# Patient Record
Sex: FEMALE | Race: BLACK OR AFRICAN AMERICAN | Employment: OTHER | URBAN - METROPOLITAN AREA
[De-identification: names, ages, dates, MRNs, and addresses within clinical notes are randomized per-mention and may not be internally consistent; named-entity substitution may affect disease eponyms.]

---

## 2017-02-02 ENCOUNTER — OP HISTORICAL/CONVERTED ENCOUNTER (OUTPATIENT)
Dept: OTHER | Age: 77
End: 2017-02-02

## 2017-08-04 ENCOUNTER — ED HISTORICAL/CONVERTED ENCOUNTER (OUTPATIENT)
Dept: OTHER | Age: 77
End: 2017-08-04

## 2017-08-12 ENCOUNTER — ED HISTORICAL/CONVERTED ENCOUNTER (OUTPATIENT)
Dept: OTHER | Age: 77
End: 2017-08-12

## 2017-10-02 ENCOUNTER — OP HISTORICAL/CONVERTED ENCOUNTER (OUTPATIENT)
Dept: OTHER | Age: 77
End: 2017-10-02

## 2017-11-07 ENCOUNTER — OP HISTORICAL/CONVERTED ENCOUNTER (OUTPATIENT)
Dept: OTHER | Age: 77
End: 2017-11-07

## 2018-01-13 ENCOUNTER — ED HISTORICAL/CONVERTED ENCOUNTER (OUTPATIENT)
Dept: OTHER | Age: 78
End: 2018-01-13

## 2018-01-16 ENCOUNTER — OP HISTORICAL/CONVERTED ENCOUNTER (OUTPATIENT)
Dept: OTHER | Age: 78
End: 2018-01-16

## 2018-03-20 ENCOUNTER — OP HISTORICAL/CONVERTED ENCOUNTER (OUTPATIENT)
Dept: OTHER | Age: 78
End: 2018-03-20

## 2018-04-25 ENCOUNTER — OP HISTORICAL/CONVERTED ENCOUNTER (OUTPATIENT)
Dept: OTHER | Age: 78
End: 2018-04-25

## 2018-10-25 ENCOUNTER — OP HISTORICAL/CONVERTED ENCOUNTER (OUTPATIENT)
Dept: OTHER | Age: 78
End: 2018-10-25

## 2018-11-01 ENCOUNTER — OP HISTORICAL/CONVERTED ENCOUNTER (OUTPATIENT)
Dept: OTHER | Age: 78
End: 2018-11-01

## 2018-11-01 LAB — COLONOSCOPY, EXTERNAL: NORMAL

## 2018-11-16 ENCOUNTER — OP HISTORICAL/CONVERTED ENCOUNTER (OUTPATIENT)
Dept: OTHER | Age: 78
End: 2018-11-16

## 2019-02-05 ENCOUNTER — OP HISTORICAL/CONVERTED ENCOUNTER (OUTPATIENT)
Dept: OTHER | Age: 79
End: 2019-02-05

## 2019-04-29 LAB — LDL-C, EXTERNAL: 87

## 2019-05-06 ENCOUNTER — OP HISTORICAL/CONVERTED ENCOUNTER (OUTPATIENT)
Dept: OTHER | Age: 79
End: 2019-05-06

## 2019-11-18 ENCOUNTER — OP HISTORICAL/CONVERTED ENCOUNTER (OUTPATIENT)
Dept: OTHER | Age: 79
End: 2019-11-18

## 2019-11-18 LAB — MAMMOGRAPHY, EXTERNAL: NORMAL

## 2019-12-20 LAB — CREATININE, EXTERNAL: 0.9

## 2020-09-21 VITALS
WEIGHT: 134 LBS | HEART RATE: 64 BPM | TEMPERATURE: 98 F | SYSTOLIC BLOOD PRESSURE: 156 MMHG | DIASTOLIC BLOOD PRESSURE: 90 MMHG | OXYGEN SATURATION: 98 % | RESPIRATION RATE: 18 BRPM | HEIGHT: 65 IN | BODY MASS INDEX: 22.33 KG/M2

## 2020-09-21 PROBLEM — E78.00 PURE HYPERCHOLESTEROLEMIA: Status: ACTIVE | Noted: 2020-09-21

## 2020-09-21 PROBLEM — I10 ESSENTIAL HYPERTENSION: Status: ACTIVE | Noted: 2020-09-21

## 2020-09-21 PROBLEM — M85.80 OSTEOPENIA: Status: ACTIVE | Noted: 2020-09-21

## 2020-09-21 PROBLEM — R53.83 FATIGUE: Status: ACTIVE | Noted: 2020-09-21

## 2020-09-22 ENCOUNTER — OFFICE VISIT (OUTPATIENT)
Dept: INTERNAL MEDICINE CLINIC | Age: 80
End: 2020-09-22
Payer: MEDICARE

## 2020-09-22 VITALS
HEIGHT: 65 IN | TEMPERATURE: 98.3 F | OXYGEN SATURATION: 99 % | BODY MASS INDEX: 21.92 KG/M2 | SYSTOLIC BLOOD PRESSURE: 130 MMHG | RESPIRATION RATE: 18 BRPM | HEART RATE: 72 BPM | DIASTOLIC BLOOD PRESSURE: 80 MMHG | WEIGHT: 131.6 LBS

## 2020-09-22 DIAGNOSIS — E78.00 PURE HYPERCHOLESTEROLEMIA: ICD-10-CM

## 2020-09-22 DIAGNOSIS — I10 ESSENTIAL HYPERTENSION: Primary | ICD-10-CM

## 2020-09-22 DIAGNOSIS — F43.21 SITUATIONAL DEPRESSION: ICD-10-CM

## 2020-09-22 DIAGNOSIS — R63.4 WEIGHT LOSS: ICD-10-CM

## 2020-09-22 DIAGNOSIS — M81.0 OSTEOPOROSIS WITHOUT CURRENT PATHOLOGICAL FRACTURE, UNSPECIFIED OSTEOPOROSIS TYPE: ICD-10-CM

## 2020-09-22 DIAGNOSIS — F41.8 SITUATIONAL ANXIETY: ICD-10-CM

## 2020-09-22 DIAGNOSIS — M85.80 OSTEOPENIA, UNSPECIFIED LOCATION: ICD-10-CM

## 2020-09-22 PROCEDURE — 99214 OFFICE O/P EST MOD 30 MIN: CPT | Performed by: INTERNAL MEDICINE

## 2020-09-22 PROCEDURE — G0444 DEPRESSION SCREEN ANNUAL: HCPCS | Performed by: INTERNAL MEDICINE

## 2020-09-22 RX ORDER — AMLODIPINE BESYLATE AND BENAZEPRIL HYDROCHLORIDE 10; 40 MG/1; MG/1
CAPSULE ORAL
COMMUNITY
End: 2020-12-22 | Stop reason: ALTCHOICE

## 2020-09-22 RX ORDER — SPIRONOLACTONE 25 MG/1
1 TABLET ORAL DAILY
COMMUNITY
End: 2020-12-22

## 2020-09-22 RX ORDER — DOCUSATE SODIUM 100 MG/1
1 CAPSULE, LIQUID FILLED ORAL
COMMUNITY
End: 2021-01-22 | Stop reason: ALTCHOICE

## 2020-09-22 RX ORDER — METOPROLOL SUCCINATE 100 MG/1
0.5 TABLET, EXTENDED RELEASE ORAL DAILY
COMMUNITY
Start: 2019-12-20 | End: 2020-12-22

## 2020-09-22 RX ORDER — SERTRALINE HYDROCHLORIDE 25 MG/1
25 TABLET, FILM COATED ORAL
Qty: 90 TAB | Refills: 0 | Status: SHIPPED | OUTPATIENT
Start: 2020-09-22 | End: 2020-12-15

## 2020-09-22 RX ORDER — ASPIRIN 81 MG/1
1 TABLET ORAL DAILY
COMMUNITY

## 2020-09-22 RX ORDER — MULTIVITAMIN
1 TABLET ORAL DAILY
COMMUNITY
End: 2021-01-22 | Stop reason: ALTCHOICE

## 2020-09-22 RX ORDER — ATORVASTATIN CALCIUM 20 MG/1
1 TABLET, FILM COATED ORAL DAILY
COMMUNITY
End: 2021-01-22 | Stop reason: ALTCHOICE

## 2020-09-22 RX ORDER — ALENDRONATE SODIUM 35 MG/1
1 TABLET ORAL
COMMUNITY
End: 2020-09-28

## 2020-09-22 NOTE — PROGRESS NOTES
Momo Martínez is a 68 y.o. female and presents with Hypertension and Cholesterol Problem    Having no new complaints except not having good appetite and some, situational anxiety, with the television news, that is going in the world and sometimes, affecting her appetite and weight,, her blood pressure is wonderfully controlled in sitting and standing position she is compliant taking medicines and she is due for her blood work, she has some mild gradual weight loss of course she is 68, she does not have negative thoughts she has good family support, she has 8 grandchildren, she has osteopenia and she had osteoporosis at one time,,  No palpitation no chest pain, no shortness of breath, no exposure to COVID-19. Review of Systems    Review of Systems   Constitutional: Positive for weight loss. HENT: Negative for hearing loss. Eyes: Negative for blurred vision. Respiratory: Negative for cough and shortness of breath. Cardiovascular: Negative. Gastrointestinal: Negative. Genitourinary: Negative. Musculoskeletal: Negative. Neurological: Negative for dizziness, sensory change and headaches. Psychiatric/Behavioral: The patient is not nervous/anxious.          Past Medical History:   Diagnosis Date    Anxiety     Asthma     Hypercholesterolemia     Hypertension     Osteoporosis     Situational depression 9/22/2020     Past Surgical History:   Procedure Laterality Date    HX COLONOSCOPY  11/10/2015    HX HYSTERECTOMY      HX THYROIDECTOMY       Social History     Socioeconomic History    Marital status:      Spouse name: Not on file    Number of children: Not on file    Years of education: Not on file    Highest education level: Not on file   Tobacco Use    Smoking status: Former Smoker    Smokeless tobacco: Former User   Substance and Sexual Activity    Alcohol use: Yes     Comment: occ     Family History   Problem Relation Age of Onset    Alzheimer Mother    Jin Manriquez Dementia Mother     Heart Disease Maternal Grandmother      Current Outpatient Medications   Medication Sig Dispense Refill    metoprolol succinate (TOPROL-XL) 100 mg tablet Take 0.5 Tabs by mouth daily.  sertraline (ZOLOFT) 25 mg tablet Take 1 Tab by mouth nightly. 90 Tab 0    atorvastatin (LIPITOR) 20 mg tablet Take 1 Tab by mouth daily.  alendronate (FOSAMAX) 35 mg tablet Take 1 Tab by mouth every seven (7) days.  spironolactone (ALDACTONE) 25 mg tablet Take 1 Tab by mouth daily.  docusate sodium (COLACE) 100 mg capsule Take 1 Cap by mouth nightly.  aspirin delayed-release 81 mg tablet Take 1 Tab by mouth daily.  calcium-cholecalciferol, D3, (CALTRATE 600+D) tablet Take 1 Tab by mouth daily.  multivit-min/iron/folic/lutein (CENTRUM SILVER WOMEN PO) Take 1 Tab by mouth daily.  amLODIPine-benazepril (LOTREL) 10-40 mg per capsule amlodipine 10 mg-benazepril 40 mg capsule   TAKE 1 CAPSULE BY MOUTH ONCE DAILY AS DIRECTED       Allergies   Allergen Reactions    Metronidazole Itching       Objective:  Visit Vitals  /80 (BP 1 Location: Left arm, BP Patient Position: Sitting)   Pulse 72   Temp 98.3 °F (36.8 °C) (Oral)   Resp 18   Ht 5' 5\" (1.651 m)   Wt 131 lb 9.6 oz (59.7 kg)   SpO2 99%   BMI 21.90 kg/m²       Physical Exam:   Constitutional: General Appearance: healthy-appearing . Level of Distress: NAD. Ambulation: ambulating normal  Psychiatric: Mental Status: normal mood and affect and active and alert. Orientation: to time, place, and person. no agitation. ,normal eye contact. normal insight  Head: Head: normocephalic and atraumatic. Eyes: Pupils: PERRLA. Sclerae: non-icteric. Neck: Neck: supple, trachea midline, and no masses. Lymph Nodes: no cervical LAD. Thyroid: no enlargement or nodules and non-tender. Lungs: Respiratory effort: no dyspnea.  Auscultation: no wheezing, rales/crackles, or rhonchi and breath sounds normal and good air movement. Cardiovascular: Apical Impulse: not displaced. Heart Auscultation: normal S1 and S2; no murmurs, rubs, or gallops; and RRR. Neck vessels: no carotid bruits. Pulses including femoral / pedal: normal throughout. Abdomen: Bowel Sounds: normal. Inspection and Palpation: no tenderness, guarding, or masses and soft and non-distended. Liver: non-tender and no hepatomegaly. Spleen: non-tender and no splenomegaly. Musculoskeletal[de-identified] Extremities: no edema,no varicosities. No Calf tenderness. Neurologic: Gait and Station: normal gait and station. Motor Strength normal right and left. Skin: Inspection and palpation: no rash, lesions, or ulcer. Results for orders placed or performed in visit on 09/21/20    MAMMOGRAPHY   Result Value Ref Range    Mammography, External repeat in 1 year     AMB EXT LDL-C   Result Value Ref Range    LDL-C, External 87    AMB EXT CREATININE   Result Value Ref Range    Creatinine, External 0.90    HM COLONOSCOPY   Result Value Ref Range    Colonoscopy, External repeat in 5 years         Assessment/Plan:    ICD-10-CM ICD-9-CM    1. Essential hypertension  I10 401.9 TSH 3RD GENERATION      CBC WITH AUTOMATED DIFF      URINALYSIS W/ RFLX MICROSCOPIC      METABOLIC PANEL, COMPREHENSIVE   2. Pure hypercholesterolemia  E78.00 272.0 LIPID PANEL   3. Osteopenia, unspecified location  M85.80 733.90 VITAMIN D, 25 HYDROXY   4. Weight loss  R63.4 783.21 TSH 3RD GENERATION      CBC WITH AUTOMATED DIFF      SED RATE (ESR)      METABOLIC PANEL, COMPREHENSIVE   5. Situational depression  F43.21 309.0 TSH 3RD GENERATION   6. Situational anxiety  F41.8 300.09    7.  Osteoporosis without current pathological fracture, unspecified osteoporosis type  M81.0 733.00 VITAMIN D, 25 HYDROXY     Orders Placed This Encounter    TSH 3RD GENERATION    CBC WITH AUTOMATED DIFF    SED RATE (ESR)    VITAMIN D, 25 HYDROXY    URINALYSIS W/ RFLX MICROSCOPIC    LIPID PANEL    METABOLIC PANEL, COMPREHENSIVE  atorvastatin (LIPITOR) 20 mg tablet     Sig: Take 1 Tab by mouth daily.  alendronate (FOSAMAX) 35 mg tablet     Sig: Take 1 Tab by mouth every seven (7) days.  spironolactone (ALDACTONE) 25 mg tablet     Sig: Take 1 Tab by mouth daily.  docusate sodium (COLACE) 100 mg capsule     Sig: Take 1 Cap by mouth nightly.  aspirin delayed-release 81 mg tablet     Sig: Take 1 Tab by mouth daily.  calcium-cholecalciferol, D3, (CALTRATE 600+D) tablet     Sig: Take 1 Tab by mouth daily.  metoprolol succinate (TOPROL-XL) 100 mg tablet     Sig: Take 0.5 Tabs by mouth daily.  multivit-min/iron/folic/lutein (CENTRUM SILVER WOMEN PO)     Sig: Take 1 Tab by mouth daily.  sertraline (ZOLOFT) 25 mg tablet     Sig: Take 1 Tab by mouth nightly. Dispense:  90 Tab     Refill:  0    amLODIPine-benazepril (LOTREL) 10-40 mg per capsule     Sig: amlodipine 10 mg-benazepril 40 mg capsule   TAKE 1 CAPSULE BY MOUTH ONCE DAILY AS DIRECTED       Hypertension controlled no real orthostatic hypotension no dizziness, continued on metoprolol  mg once a day, spironolactone 25 mg once a day, continue amlodipine benazepril 10/40 mg once a day. Diet low in sodium and labs ordered. Hypercholesterolemia labs ordered and continue atorvastatin 20 mg at bedtime. Healthy eating habits. Osteopenia with history of osteoporosis in the past vitamin D level ordered. Weight loss which is gradual she has some situational anxiety and mild sadness with the things going in the world and heavy COVID-19, strongly recommended to, not to watch the news on the television that can make her sad instead she should be occupied in a more healthy news and started on low-dose of sertraline 25 mg once a day and follow-up in 3 months and labs ordered.     , Education and counseling given and I reviewed her previous encounters and previous weight and she has some gradual weight loss but, she does not have any abnormal symptoms she has very good support from her  and she has 8 grandchildren,Enjoying her life otherwise,. Answered all her questions and follow-up in 3 months. follow low fat diet, continue present plan, routine labs ordered, call if any problems    There are no Patient Instructions on file for this visit. Follow-up and Dispositions    · Return in about 3 months (around 12/22/2020) for htn ,[place] ,fasting labs now.

## 2020-09-28 RX ORDER — ALENDRONATE SODIUM 35 MG/1
TABLET ORAL
Qty: 12 TAB | Refills: 1 | Status: SHIPPED | OUTPATIENT
Start: 2020-09-28 | End: 2021-04-09

## 2020-12-22 ENCOUNTER — OFFICE VISIT (OUTPATIENT)
Dept: INTERNAL MEDICINE CLINIC | Age: 80
End: 2020-12-22
Payer: MEDICARE

## 2020-12-22 VITALS
DIASTOLIC BLOOD PRESSURE: 90 MMHG | RESPIRATION RATE: 18 BRPM | HEIGHT: 65 IN | WEIGHT: 136 LBS | SYSTOLIC BLOOD PRESSURE: 164 MMHG | HEART RATE: 72 BPM | OXYGEN SATURATION: 99 % | BODY MASS INDEX: 22.66 KG/M2

## 2020-12-22 DIAGNOSIS — F41.8 SITUATIONAL ANXIETY: ICD-10-CM

## 2020-12-22 DIAGNOSIS — E78.00 PURE HYPERCHOLESTEROLEMIA: ICD-10-CM

## 2020-12-22 DIAGNOSIS — M85.80 OSTEOPENIA, UNSPECIFIED LOCATION: ICD-10-CM

## 2020-12-22 DIAGNOSIS — F43.21 SITUATIONAL DEPRESSION: ICD-10-CM

## 2020-12-22 DIAGNOSIS — R68.89 FORGETFULNESS: ICD-10-CM

## 2020-12-22 DIAGNOSIS — I10 ESSENTIAL HYPERTENSION: ICD-10-CM

## 2020-12-22 PROCEDURE — 99214 OFFICE O/P EST MOD 30 MIN: CPT | Performed by: INTERNAL MEDICINE

## 2020-12-22 RX ORDER — METOPROLOL SUCCINATE 50 MG/1
50 TABLET, EXTENDED RELEASE ORAL DAILY
Qty: 90 TAB | Refills: 1 | Status: SHIPPED | OUTPATIENT
Start: 2020-12-22 | End: 2021-07-15

## 2020-12-22 RX ORDER — LOSARTAN POTASSIUM AND HYDROCHLOROTHIAZIDE 12.5; 5 MG/1; MG/1
1 TABLET ORAL DAILY
Qty: 90 TAB | Refills: 1 | Status: SHIPPED | OUTPATIENT
Start: 2020-12-22 | End: 2021-06-15

## 2020-12-22 RX ORDER — SERTRALINE HYDROCHLORIDE 25 MG/1
25 TABLET, FILM COATED ORAL DAILY
Qty: 90 TAB | Refills: 1 | Status: SHIPPED | OUTPATIENT
Start: 2020-12-22 | End: 2021-05-28 | Stop reason: DRUGHIGH

## 2020-12-22 RX ORDER — AMLODIPINE BESYLATE 10 MG/1
10 TABLET ORAL DAILY
Qty: 90 TAB | Refills: 1 | Status: SHIPPED | OUTPATIENT
Start: 2020-12-22 | End: 2021-06-15

## 2020-12-22 NOTE — PROGRESS NOTES
Sara Barragan is a 68 y.o. female and presents with Follow Up Chronic Condition (htn ,[place] )    She did not take any of her medicine and her blood pressure is elevated it seems like she remains confused, according to her she lives with her , she has anxious personality, I did medicine reconciliation and it seems like she is not taking low-dose of sertraline and atorvastatin having hypercholesterolemia,,, I made changes in the medicines explained by me and by my nurse, that I have stopped amlodipine benazepril 10/40 mg once a day and I have stopped spironolactone 25 mg once a day instead she has been started on losartan hydrochlorothiazide 50/12.5 mg once a day, amlodipine 10 mg once a day and continued on metoprolol ER 50 mg once a day. In the past she could not tolerate higher dose of metoprolol ER given by her cardiologist.  She has anxious personality worried for coronavirus and explained about holistic approach and to start taking sertraline 25 mg at bedtime if needed I will start her on rescue medicine she told me she does not have panic attacks and last time, in last visit I ordered labs in, September she did not do any labs, she does not have anybody else who can come with her except her ,, clinically remains confused, regarding her medicines, does not look like she has dementia,,  If needed I will refer her to neurologist after doing her lab work  lab orders created again. No negative thoughts. she used to take alendronate 35 mg once a week having osteopenia. In the past she follows and used to see cardiologist once a year. Review of Systems    Review of Systems   Constitutional: Negative. Slight weight gain   HENT: Negative for congestion, hearing loss, sinus pain and sore throat. Eyes: Negative for blurred vision. Respiratory: Negative for cough and wheezing. Cardiovascular: Negative. Gastrointestinal: Negative. Genitourinary: Negative. Musculoskeletal: Negative. Neurological: Negative for dizziness, sensory change, speech change, weakness and headaches. Psychiatric/Behavioral: Negative for depression, hallucinations, memory loss, substance abuse and suicidal ideas. The patient is nervous/anxious. history of anxious personality      Pleasant  Past Medical History:   Diagnosis Date    Anxiety     Asthma     Hypercholesterolemia     Hypertension     Osteoporosis     Situational depression 9/22/2020     Past Surgical History:   Procedure Laterality Date    HX COLONOSCOPY  11/10/2015    HX HYSTERECTOMY      HX THYROIDECTOMY       Social History     Socioeconomic History    Marital status:      Spouse name: Not on file    Number of children: Not on file    Years of education: Not on file    Highest education level: Not on file   Tobacco Use    Smoking status: Former Smoker    Smokeless tobacco: Former User   Substance and Sexual Activity    Alcohol use: Yes     Comment: occ     Family History   Problem Relation Age of Onset    Alzheimer Mother     Dementia Mother     Heart Disease Maternal Grandmother      Current Outpatient Medications   Medication Sig Dispense Refill    losartan-hydroCHLOROthiazide (HYZAAR) 50-12.5 mg per tablet Take 1 Tab by mouth daily. 90 Tab 1    amLODIPine (NORVASC) 10 mg tablet Take 1 Tab by mouth daily. 90 Tab 1    metoprolol succinate (TOPROL-XL) 50 mg XL tablet Take 1 Tab by mouth daily. 90 Tab 1    sertraline (ZOLOFT) 25 mg tablet Take 1 Tab by mouth daily. 90 Tab 1    alendronate (FOSAMAX) 35 mg tablet TAKE 1 TABLET BY MOUTH ONCE WEEK AS DIRECTECD 12 Tab 1    atorvastatin (LIPITOR) 20 mg tablet Take 1 Tab by mouth daily.  docusate sodium (COLACE) 100 mg capsule Take 1 Cap by mouth nightly.  aspirin delayed-release 81 mg tablet Take 1 Tab by mouth daily.  calcium-cholecalciferol, D3, (CALTRATE 600+D) tablet Take 1 Tab by mouth daily.       multivit-min/iron/folic/lutein (CENTRUM SILVER WOMEN PO) Take 1 Tab by mouth daily. Allergies   Allergen Reactions    Metronidazole Itching       Objective:  Visit Vitals  BP (!) 164/90 (BP 1 Location: Left arm, BP Patient Position: Sitting)   Pulse 72   Resp 18   Ht 5' 5\" (1.651 m)   Wt 136 lb (61.7 kg)   SpO2 99%   BMI 22.63 kg/m²       Physical Exam:   Constitutional: General Appearance: Pleasant and anxious. Level of Distress: NAD. Psychiatric: Mental Status: normal mood and affect Orientation: to time, place, and person. ,normal eye contact. Head: Head: normocephalic and atraumatic. Eyes: Pupils: PERRLA. Sclerae: non-icteric. Neck: Neck: supple, trachea midline, and no masses. Lymph Nodes: no cervical LAD. Thyroid: no enlargement or nodules and non-tender. Lungs: Respiratory effort: no dyspnea. Auscultation: no wheezing, rales/crackles, or rhonchi and breath sounds normal and good air movement. Cardiovascular: Apical Impulse: not displaced. Heart Auscultation: normal S1 and S2; no murmurs, rubs, or gallops; and RRR. Neck vessels: no carotid bruits. Pulses including femoral / pedal: normal throughout. Abdomen: Bowel Sounds: normal. Inspection and Palpation: no tenderness, guarding, or masses and soft and non-distended. Liver: non-tender and no hepatomegaly. Spleen: non-tender and no splenomegaly. Musculoskeletal[de-identified] Extremities: no edema,no varicosities. No Calf tenderness. Neurologic: Gait and Station: normal gait and station. Motor Strength normal right and left. Skin: Inspection and palpation: no rash, lesions, or ulcer.        Results for orders placed or performed in visit on 09/21/20    MAMMOGRAPHY   Result Value Ref Range    Mammography, External repeat in 1 year     AMB EXT LDL-C   Result Value Ref Range    LDL-C, External 87    AMB EXT CREATININE   Result Value Ref Range    Creatinine, External 0.90    HM COLONOSCOPY   Result Value Ref Range    Colonoscopy, External repeat in 5 years         Assessment/Plan:      ICD-10-CM ICD-9-CM    1. Essential hypertension  I10 401.9 CBC WITH AUTOMATED DIFF      METABOLIC PANEL, COMPREHENSIVE      URINALYSIS W/ RFLX MICROSCOPIC      TSH 3RD GENERATION   2. Pure hypercholesterolemia  E78.00 272.0 LIPID PANEL   3. Situational anxiety  F41.8 300.09    4. Osteopenia, unspecified location  M85.80 733.90    5. Situational depression  F43.21 309.0    6. Forgetfulness  R68.89 780.99 CBC WITH AUTOMATED DIFF      METABOLIC PANEL, COMPREHENSIVE      TSH 3RD GENERATION      VITAMIN B12     Orders Placed This Encounter    CBC WITH AUTOMATED DIFF    METABOLIC PANEL, COMPREHENSIVE    LIPID PANEL    URINALYSIS W/ RFLX MICROSCOPIC    TSH 3RD GENERATION    VITAMIN B12    losartan-hydroCHLOROthiazide (HYZAAR) 50-12.5 mg per tablet     Sig: Take 1 Tab by mouth daily. Dispense:  90 Tab     Refill:  1    amLODIPine (NORVASC) 10 mg tablet     Sig: Take 1 Tab by mouth daily. Dispense:  90 Tab     Refill:  1    metoprolol succinate (TOPROL-XL) 50 mg XL tablet     Sig: Take 1 Tab by mouth daily. Dispense:  90 Tab     Refill:  1    sertraline (ZOLOFT) 25 mg tablet     Sig: Take 1 Tab by mouth daily. Dispense:  90 Tab     Refill:  1       Hypertension uncontrolled in both upper arms according to her, it seems like she has not taken her medicines it seems like she is not diligent in maintaining the time  for taking medicines I changed her amlodipine benazepril, and stopped amlodipine benazepril,  I stopped her spironolactone for better compliance,. Started on amlodipine 10 mg once a day, losartan hydrochlorothiazide 50/12.5 mg once a day and continued on metoprolol ER 50 mg once a day which can help in her  anxiety related palpitations.     In the past she could not tolerate higher dose of metoprolol ER given by Dr. Shauna Alcaraz her cardiologist.    Anand Song it seems like she has stopped taking atorvastatin she used to take atorvastatin 20 mg at bedtime I will order labs if needed I will decrease the dose at her age.    , History of anxiety with history of situational depression now she is worried for COVID-19, reassured, recommended to  not to watch unimportant news, and not to be anxious, try to be involved in doing creative and, constructive activities and prayers, she agreed she should have holistic approach, started back on sertraline 25 mg/day and if needed I will start her on,, alprazolam low-dose. Osteopenia continue alendronate 35 mg once a day with multivitamins and vitamin D supplementation. Labs ordered. I am not still convinced that she has forgetfulness but it seems like she remains confused, if needed I will refer her to neurologist after reviewing her lab results and after  monitoring her blood pressure, she lives with her . She is not providing good history. She has no negative thoughts. Follow-up in 3 weeks. Refills given and labs ordered and we called pharmacy also not to give refill for medicine that I stopped including spironolactone and amlodipine benazepril that I  again denied I told her that she should also discuss with pharmacist that these 2 medicines are stopped,.    continue present plan, routine labs ordered, call if any problems    There are no Patient Instructions on file for this visit. Follow-up and Dispositions    · Return in about 3 weeks (around 1/12/2021) for htn ,fasting labs now ,she should bring some responsible family memeber .

## 2021-01-22 ENCOUNTER — OFFICE VISIT (OUTPATIENT)
Dept: INTERNAL MEDICINE CLINIC | Age: 81
End: 2021-01-22
Payer: MEDICARE

## 2021-01-22 VITALS
OXYGEN SATURATION: 97 % | HEART RATE: 60 BPM | HEIGHT: 65 IN | DIASTOLIC BLOOD PRESSURE: 78 MMHG | BODY MASS INDEX: 22.33 KG/M2 | WEIGHT: 134 LBS | SYSTOLIC BLOOD PRESSURE: 130 MMHG | RESPIRATION RATE: 18 BRPM

## 2021-01-22 DIAGNOSIS — E55.9 VITAMIN D DEFICIENCY: ICD-10-CM

## 2021-01-22 DIAGNOSIS — F41.8 SITUATIONAL ANXIETY: ICD-10-CM

## 2021-01-22 DIAGNOSIS — M85.80 OSTEOPENIA, UNSPECIFIED LOCATION: ICD-10-CM

## 2021-01-22 DIAGNOSIS — F43.21 SITUATIONAL DEPRESSION: ICD-10-CM

## 2021-01-22 DIAGNOSIS — R68.89 FORGETFULNESS: ICD-10-CM

## 2021-01-22 DIAGNOSIS — I10 ESSENTIAL HYPERTENSION: ICD-10-CM

## 2021-01-22 DIAGNOSIS — Z86.59 HISTORY OF PANIC ATTACKS: ICD-10-CM

## 2021-01-22 DIAGNOSIS — E78.00 PURE HYPERCHOLESTEROLEMIA: ICD-10-CM

## 2021-01-22 PROCEDURE — G9717 DOC PT DX DEP/BP F/U NT REQ: HCPCS | Performed by: INTERNAL MEDICINE

## 2021-01-22 PROCEDURE — G8754 DIAS BP LESS 90: HCPCS | Performed by: INTERNAL MEDICINE

## 2021-01-22 PROCEDURE — G8400 PT W/DXA NO RESULTS DOC: HCPCS | Performed by: INTERNAL MEDICINE

## 2021-01-22 PROCEDURE — 1090F PRES/ABSN URINE INCON ASSESS: CPT | Performed by: INTERNAL MEDICINE

## 2021-01-22 PROCEDURE — 99213 OFFICE O/P EST LOW 20 MIN: CPT | Performed by: INTERNAL MEDICINE

## 2021-01-22 PROCEDURE — 1100F PTFALLS ASSESS-DOCD GE2>/YR: CPT | Performed by: INTERNAL MEDICINE

## 2021-01-22 PROCEDURE — G8427 DOCREV CUR MEDS BY ELIG CLIN: HCPCS | Performed by: INTERNAL MEDICINE

## 2021-01-22 PROCEDURE — G8420 CALC BMI NORM PARAMETERS: HCPCS | Performed by: INTERNAL MEDICINE

## 2021-01-22 PROCEDURE — G8752 SYS BP LESS 140: HCPCS | Performed by: INTERNAL MEDICINE

## 2021-01-22 PROCEDURE — 3288F FALL RISK ASSESSMENT DOCD: CPT | Performed by: INTERNAL MEDICINE

## 2021-01-22 PROCEDURE — G8536 NO DOC ELDER MAL SCRN: HCPCS | Performed by: INTERNAL MEDICINE

## 2021-01-22 RX ORDER — HYDROXYZINE HYDROCHLORIDE 10 MG/1
10 TABLET, FILM COATED ORAL
Qty: 30 TAB | Refills: 1 | Status: SHIPPED | OUTPATIENT
Start: 2021-01-22 | End: 2021-02-01

## 2021-01-22 NOTE — PROGRESS NOTES
Mane Albarado is a 68 y.o. female and presents with Hypertension    Ms. Ann Hughes came with all her medications today her blood pressure is wonderfully controlled after making changes in her medicine in last visit, she did not do her labs that I ordered, she sometimes remains confused so, labs orders placed again and given in her hand. She told me that she has anxiety with the things that are going in the country patient is reassured that nothing can be changed and she has to keep positive thinking and jeff in God. She has no orthostatic hypotension. I did not see any statin in her back. I will order lipid profile and if needed I will start her on statin. She has osteopenia taking alendronate. She has no chest pain or shortness of breath she has started taking low-dose of sertraline for her history of anxiety. She has quit smoking cigarettes since long. She lives with her family members. She has good family support. She understood all instructions appropriately and correctly                  Review of Systems    Review of Systems   Constitutional: Negative. HENT: Negative for congestion, hearing loss and sore throat. Eyes: Negative for blurred vision. Respiratory: Negative for cough. Cardiovascular: Negative. Gastrointestinal: Negative. Genitourinary: Negative. Musculoskeletal: Negative. Neurological: Negative for dizziness, focal weakness and headaches. Psychiatric/Behavioral: Negative for depression and memory loss. The patient is not nervous/anxious and does not have insomnia.          Past Medical History:   Diagnosis Date    Anxiety     Asthma     Hypercholesterolemia     Hypertension     Osteoporosis     Situational depression 9/22/2020     Past Surgical History:   Procedure Laterality Date    HX COLONOSCOPY  11/10/2015    HX HYSTERECTOMY      HX THYROIDECTOMY       Social History     Socioeconomic History    Marital status:      Spouse name: Not on file  Number of children: Not on file    Years of education: Not on file    Highest education level: Not on file   Tobacco Use    Smoking status: Former Smoker    Smokeless tobacco: Former User   Substance and Sexual Activity    Alcohol use: Yes     Comment: occ     Family History   Problem Relation Age of Onset    Alzheimer Mother     Dementia Mother     Heart Disease Maternal Grandmother      Current Outpatient Medications   Medication Sig Dispense Refill    hydrOXYzine HCL (ATARAX) 10 mg tablet Take 1 Tab by mouth two (2) times daily as needed for Anxiety for up to 10 days. 30 Tab 1    losartan-hydroCHLOROthiazide (HYZAAR) 50-12.5 mg per tablet Take 1 Tab by mouth daily. 90 Tab 1    amLODIPine (NORVASC) 10 mg tablet Take 1 Tab by mouth daily. 90 Tab 1    metoprolol succinate (TOPROL-XL) 50 mg XL tablet Take 1 Tab by mouth daily. 90 Tab 1    sertraline (ZOLOFT) 25 mg tablet Take 1 Tab by mouth daily. 90 Tab 1    alendronate (FOSAMAX) 35 mg tablet TAKE 1 TABLET BY MOUTH ONCE WEEK AS DIRECTECD 12 Tab 1    aspirin delayed-release 81 mg tablet Take 1 Tab by mouth daily.  multivit-min/iron/folic/lutein (CENTRUM SILVER WOMEN PO) Take 1 Tab by mouth daily. Allergies   Allergen Reactions    Metronidazole Itching       Objective:  Visit Vitals  /78 (BP 1 Location: Left arm, BP Patient Position: Sitting)   Pulse 60   Resp 18   Ht 5' 5\" (1.651 m)   Wt 134 lb (60.8 kg)   SpO2 97%   BMI 22.30 kg/m²       Physical Exam:   Constitutional: General Appearance: . Level of Distress: NAD. Psychiatric: Mental Status: normal mood and affect Orientation: to time, place, and person. ,normal eye contact. Head: Head: normocephalic and atraumatic. Eyes: Pupils: PERRLA. Sclerae: non-icteric. Neck: Neck: supple, trachea midline, and no masses. Lymph Nodes: no cervical LAD. Thyroid: no enlargement or nodules and non-tender. Lungs: Respiratory effort: no dyspnea.  Auscultation: no wheezing, rales/crackles, or rhonchi and breath sounds normal and good air movement. Cardiovascular: Apical Impulse: not displaced. Heart Auscultation: normal S1 and S2; no murmurs, rubs, or gallops; and RRR. Neck vessels: no carotid bruits. Pulses including femoral / pedal: normal throughout. Abdomen: Bowel Sounds: normal. Inspection and Palpation: no tenderness, guarding, or masses and soft and non-distended. Liver: non-tender and no hepatomegaly. Spleen: non-tender and no splenomegaly. Musculoskeletal[de-identified] Extremities: no edema,no varicosities. No Calf tenderness. Neurologic: Gait and Station: normal gait and station. Motor Strength normal right and left. Skin: Inspection and palpation: no rash, lesions, or ulcer. Results for orders placed or performed in visit on 09/21/20    MAMMOGRAPHY   Result Value Ref Range    Mammography, External repeat in 1 year     AMB EXT LDL-C   Result Value Ref Range    LDL-C, External 87    AMB EXT CREATININE   Result Value Ref Range    Creatinine, External 0.90    HM COLONOSCOPY   Result Value Ref Range    Colonoscopy, External repeat in 5 years         Assessment/Plan:      ICD-10-CM ICD-9-CM    1. Situational anxiety  F41.8 300.09    2. Essential hypertension  I10 401.9 CBC WITH AUTOMATED DIFF      METABOLIC PANEL, COMPREHENSIVE   3. Situational depression  F43.21 309.0 VITAMIN B12   4. Osteopenia, unspecified location  M85.80 733.90    5. Pure hypercholesterolemia  E78.00 272.0 LIPID PANEL   6. Vitamin D deficiency  E55.9 268.9 VITAMIN D, 25 HYDROXY   7. Forgetfulness  R68.89 780.99 TSH 3RD GENERATION      VITAMIN B12   8. History of panic attacks  Z86.59 V11.8      Orders Placed This Encounter    CBC WITH AUTOMATED DIFF    METABOLIC PANEL, COMPREHENSIVE    LIPID PANEL    TSH 3RD GENERATION    VITAMIN B12    VITAMIN D, 25 HYDROXY    hydrOXYzine HCL (ATARAX) 10 mg tablet     Sig: Take 1 Tab by mouth two (2) times daily as needed for Anxiety for up to 10 days.      Dispense:  30 Tab Refill:  1     Hypertension controlled in last visit I made changes and now she is taking all medicines correctly,. Currently she is taking losartan hydrochlorothiazide currently she is taking losartan hydrochlorothiazide 50/12.5 mg once a day,, metoprolol ER 50 mg once a day, amlodipine 10 mg once a day. Diet low in sodium. Labs ordered. She hypercholesteremia she has stopped taking atorvastatin so I will check lipid profile before being on atorvastatin and she does not recall when she stopped taking. Generalized anxiety disorder with mild anxiety with the things going in the country counseling given reassurance given, holistic approach to be taken and explained to keep jeff in the God that she cannot change certain things that she cannot and she has to accept  the things and she has good support from her , and she is taking sertraline 25 mg once a day recommended not to stop. Also started on hydroxyzine 10 mg twice a day sometimes she gets palpitation and she is not able to describe but she might have panic attack so I gave low-dose which will not make her drowsy and to be taken only as needed. She has no negative thoughts. Osteopenia taking alendronate 35 mg once a week and continue  multivitamin once a day and vitamin D 1000/day    Follow-up in 2 months that she wants to come and fasting labs ordered. I checked her medicines and refills she is good for all refills until 6 months. Overall she is doing good, but she gets stressed out and forgets I do not think she has dementia but we will keep close monitoring,. She is independent for ADL and IADL and doing everything and nobody has complained in her family's that she has started memory problem or forgetfulness. Sometimes she has difficulty in hearing due to wearing mask    continue present plan, routine labs ordered, call if any problems    There are no Patient Instructions on file for this visit.    Follow-up and Dispositions · Return in about 2 months (around 3/22/2021) for htn ,marek ,anxiety.

## 2021-01-23 LAB
25(OH)D3+25(OH)D2 SERPL-MCNC: 31 NG/ML (ref 30–100)
ALBUMIN SERPL-MCNC: 4.3 G/DL (ref 3.7–4.7)
ALBUMIN/GLOB SERPL: 1.6 {RATIO} (ref 1.2–2.2)
ALP SERPL-CCNC: 68 IU/L (ref 39–117)
ALT SERPL-CCNC: 13 IU/L (ref 0–32)
AST SERPL-CCNC: 17 IU/L (ref 0–40)
BASOPHILS # BLD AUTO: 0.1 X10E3/UL (ref 0–0.2)
BASOPHILS NFR BLD AUTO: 1 %
BILIRUB SERPL-MCNC: 0.3 MG/DL (ref 0–1.2)
BUN SERPL-MCNC: 16 MG/DL (ref 8–27)
BUN/CREAT SERPL: 14 (ref 12–28)
CALCIUM SERPL-MCNC: 9.6 MG/DL (ref 8.7–10.3)
CHLORIDE SERPL-SCNC: 97 MMOL/L (ref 96–106)
CHOLEST SERPL-MCNC: 255 MG/DL (ref 100–199)
CO2 SERPL-SCNC: 28 MMOL/L (ref 20–29)
CREAT SERPL-MCNC: 1.12 MG/DL (ref 0.57–1)
EOSINOPHIL # BLD AUTO: 0.1 X10E3/UL (ref 0–0.4)
EOSINOPHIL NFR BLD AUTO: 1 %
ERYTHROCYTE [DISTWIDTH] IN BLOOD BY AUTOMATED COUNT: 13.8 % (ref 11.7–15.4)
GLOBULIN SER CALC-MCNC: 2.7 G/DL (ref 1.5–4.5)
GLUCOSE SERPL-MCNC: 109 MG/DL (ref 65–99)
HCT VFR BLD AUTO: 37.2 % (ref 34–46.6)
HDLC SERPL-MCNC: 70 MG/DL
HGB BLD-MCNC: 12.6 G/DL (ref 11.1–15.9)
IMM GRANULOCYTES # BLD AUTO: 0 X10E3/UL (ref 0–0.1)
IMM GRANULOCYTES NFR BLD AUTO: 0 %
LDLC SERPL CALC-MCNC: 169 MG/DL (ref 0–99)
LYMPHOCYTES # BLD AUTO: 1.3 X10E3/UL (ref 0.7–3.1)
LYMPHOCYTES NFR BLD AUTO: 17 %
MCH RBC QN AUTO: 27.6 PG (ref 26.6–33)
MCHC RBC AUTO-ENTMCNC: 33.9 G/DL (ref 31.5–35.7)
MCV RBC AUTO: 82 FL (ref 79–97)
MONOCYTES # BLD AUTO: 0.5 X10E3/UL (ref 0.1–0.9)
MONOCYTES NFR BLD AUTO: 6 %
NEUTROPHILS # BLD AUTO: 5.8 X10E3/UL (ref 1.4–7)
NEUTROPHILS NFR BLD AUTO: 75 %
PLATELET # BLD AUTO: 293 X10E3/UL (ref 150–450)
POTASSIUM SERPL-SCNC: 3.1 MMOL/L (ref 3.5–5.2)
PROT SERPL-MCNC: 7 G/DL (ref 6–8.5)
RBC # BLD AUTO: 4.56 X10E6/UL (ref 3.77–5.28)
SODIUM SERPL-SCNC: 141 MMOL/L (ref 134–144)
TRIGL SERPL-MCNC: 95 MG/DL (ref 0–149)
TSH SERPL DL<=0.005 MIU/L-ACNC: 1.38 UIU/ML (ref 0.45–4.5)
VIT B12 SERPL-MCNC: 710 PG/ML (ref 232–1245)
VLDLC SERPL CALC-MCNC: 16 MG/DL (ref 5–40)
WBC # BLD AUTO: 7.7 X10E3/UL (ref 3.4–10.8)

## 2021-01-24 RX ORDER — ATORVASTATIN CALCIUM 10 MG/1
10 TABLET, FILM COATED ORAL DAILY
Qty: 90 TAB | Refills: 1 | Status: SHIPPED | OUTPATIENT
Start: 2021-01-24 | End: 2022-09-28

## 2021-01-24 NOTE — PROGRESS NOTES
Please call her that her cholesterol is up somehow she has stopped taking atorvastatin I am ordering atorvastatin 10 mg at bedtime, other labs are very good, liver and kidney function is stable at her age of 68, so continue all the medicines and I am adding statin at bedtime for high cholesterol numbers, to prevent heart attack and stroke and other complications from high cholesterol,

## 2021-02-03 ENCOUNTER — TELEPHONE (OUTPATIENT)
Dept: INTERNAL MEDICINE CLINIC | Age: 81
End: 2021-02-03

## 2021-02-03 RX ORDER — HYDROXYZINE HYDROCHLORIDE 10 MG/1
10 TABLET, FILM COATED ORAL
Qty: 60 TAB | Refills: 3 | Status: SHIPPED | OUTPATIENT
Start: 2021-02-03 | End: 2021-06-27

## 2021-02-03 NOTE — TELEPHONE ENCOUNTER
[facility] sent in a request for authorization for hydroxyzine HCL 10 mg tablet    Take 1 tab by mouth two (2) times daily as needed for anxiety for up to 10 days.     Qty: 180    LOV 1/22    FOV 3/23

## 2021-04-09 RX ORDER — ALENDRONATE SODIUM 35 MG/1
TABLET ORAL
Qty: 12 TAB | Refills: 1 | Status: SHIPPED | OUTPATIENT
Start: 2021-04-09 | End: 2021-09-22

## 2021-05-28 ENCOUNTER — OFFICE VISIT (OUTPATIENT)
Dept: INTERNAL MEDICINE CLINIC | Age: 81
End: 2021-05-28
Payer: MEDICARE

## 2021-05-28 VITALS
OXYGEN SATURATION: 99 % | RESPIRATION RATE: 12 BRPM | HEIGHT: 65 IN | HEART RATE: 60 BPM | DIASTOLIC BLOOD PRESSURE: 80 MMHG | SYSTOLIC BLOOD PRESSURE: 138 MMHG | WEIGHT: 138 LBS | BODY MASS INDEX: 22.99 KG/M2

## 2021-05-28 DIAGNOSIS — E87.6 HYPOKALEMIA: ICD-10-CM

## 2021-05-28 DIAGNOSIS — E78.00 PURE HYPERCHOLESTEROLEMIA: ICD-10-CM

## 2021-05-28 DIAGNOSIS — I10 ESSENTIAL HYPERTENSION: ICD-10-CM

## 2021-05-28 DIAGNOSIS — Z86.59 HISTORY OF PANIC ATTACKS: ICD-10-CM

## 2021-05-28 DIAGNOSIS — F41.8 SITUATIONAL ANXIETY: ICD-10-CM

## 2021-05-28 DIAGNOSIS — F43.21 SITUATIONAL DEPRESSION: ICD-10-CM

## 2021-05-28 PROCEDURE — 1100F PTFALLS ASSESS-DOCD GE2>/YR: CPT | Performed by: INTERNAL MEDICINE

## 2021-05-28 PROCEDURE — G8400 PT W/DXA NO RESULTS DOC: HCPCS | Performed by: INTERNAL MEDICINE

## 2021-05-28 PROCEDURE — 99214 OFFICE O/P EST MOD 30 MIN: CPT | Performed by: INTERNAL MEDICINE

## 2021-05-28 PROCEDURE — G8427 DOCREV CUR MEDS BY ELIG CLIN: HCPCS | Performed by: INTERNAL MEDICINE

## 2021-05-28 PROCEDURE — G9717 DOC PT DX DEP/BP F/U NT REQ: HCPCS | Performed by: INTERNAL MEDICINE

## 2021-05-28 PROCEDURE — G8752 SYS BP LESS 140: HCPCS | Performed by: INTERNAL MEDICINE

## 2021-05-28 PROCEDURE — G8536 NO DOC ELDER MAL SCRN: HCPCS | Performed by: INTERNAL MEDICINE

## 2021-05-28 PROCEDURE — G8754 DIAS BP LESS 90: HCPCS | Performed by: INTERNAL MEDICINE

## 2021-05-28 PROCEDURE — 3288F FALL RISK ASSESSMENT DOCD: CPT | Performed by: INTERNAL MEDICINE

## 2021-05-28 PROCEDURE — 1090F PRES/ABSN URINE INCON ASSESS: CPT | Performed by: INTERNAL MEDICINE

## 2021-05-28 PROCEDURE — G8420 CALC BMI NORM PARAMETERS: HCPCS | Performed by: INTERNAL MEDICINE

## 2021-05-28 RX ORDER — SERTRALINE HYDROCHLORIDE 50 MG/1
50 TABLET, FILM COATED ORAL DAILY
Qty: 90 TABLET | Refills: 1 | Status: SHIPPED | OUTPATIENT
Start: 2021-05-28 | End: 2021-11-21

## 2021-05-28 RX ORDER — ATORVASTATIN CALCIUM 10 MG/1
10 TABLET, FILM COATED ORAL DAILY
Qty: 90 TABLET | Refills: 1 | Status: SHIPPED | OUTPATIENT
Start: 2021-05-28 | End: 2021-08-31 | Stop reason: SDUPTHER

## 2021-05-28 NOTE — PROGRESS NOTES
Richardson Almonte is a 68 y.o. female and presents with Follow Up Chronic Condition, Hypertension, Cholesterol Problem, and Anxiety    Ms. Delmis Ledesma came for regular follow-up this time she has come with her . I had a long discussion regarding, control of blood pressure and other preventive education. Her blood pressure is slightly elevated but after resting it is almost normal and she has anxious personality but according to Mr. Delmis Ledesma since last visit I started on hydroxyzine and low-dose of sertraline she is much better in her anxiety and mild situational depression and she is worried regarding this things going in the country and I had a long  Discussion, not to affect herself with the world news and [employer]. After resting her blood pressure is better she is very compliant for medicines somehow she is not taking atorvastatin 10 mg/day. At 1 time she was on diuretic at that time she had  hypokalemia and she was taken off from diuretics and I ordered the labs again today. She is fasting. She is up to date with the 2 doses of COVID-19 vaccine. No negative thoughts. She is counseled and reassured and she felt much better otherwise she is doing good. She brought medicine list.  She follows cardiologist once a year. Labs done in January also discussed in presence of her  and he also went through all the labs on the computer    Review of Systems    Review of Systems   Constitutional: Negative. HENT: Negative for congestion and sore throat. Eyes: Negative for blurred vision. Respiratory: Negative for cough, shortness of breath and wheezing. Cardiovascular: Negative. Gastrointestinal: Negative. Genitourinary: Negative for dysuria, flank pain and hematuria. Musculoskeletal: Negative for falls, myalgias and neck pain. Neurological: Negative for dizziness, tingling, sensory change and headaches.    Psychiatric/Behavioral: Negative for hallucinations, memory loss, substance abuse and suicidal ideas. The patient is nervous/anxious. The patient does not have insomnia. Anxiety and mild situational sadness is better after being on medicines        Past Medical History:   Diagnosis Date    Anxiety     Asthma     Hypercholesterolemia     Hypertension     Osteoporosis     Situational depression 9/22/2020     Past Surgical History:   Procedure Laterality Date    HX COLONOSCOPY  11/10/2015    HX HYSTERECTOMY      HX THYROIDECTOMY       Social History     Socioeconomic History    Marital status:      Spouse name: Not on file    Number of children: Not on file    Years of education: Not on file    Highest education level: Not on file   Tobacco Use    Smoking status: Former Smoker     Packs/day: 0.25     Years: 10.00     Pack years: 2.50     Types: Cigarettes     Quit date: 2011     Years since quitting: 10.4    Smokeless tobacco: Former User   Substance and Sexual Activity    Alcohol use: Yes     Comment: occ     Social Determinants of Health     Financial Resource Strain:     Difficulty of Paying Living Expenses:    Food Insecurity:     Worried About Running Out of Food in the Last Year:     Ran Out of Food in the Last Year:    Transportation Needs:     Lack of Transportation (Medical):      Lack of Transportation (Non-Medical):    Physical Activity:     Days of Exercise per Week:     Minutes of Exercise per Session:    Stress:     Feeling of Stress :    Social Connections:     Frequency of Communication with Friends and Family:     Frequency of Social Gatherings with Friends and Family:     Attends Hindu Services:     Active Member of Clubs or Organizations:     Attends Club or Organization Meetings:     Marital Status:      Family History   Problem Relation Age of Onset    Alzheimer Mother     Dementia Mother     Heart Disease Maternal Grandmother      Current Outpatient Medications   Medication Sig Dispense Refill    atorvastatin (LIPITOR) 10 mg tablet Take 1 Tablet by mouth daily. 90 Tablet 1    sertraline (ZOLOFT) 50 mg tablet Take 1 Tablet by mouth daily. 90 Tablet 1    alendronate (FOSAMAX) 35 mg tablet TAKE 1 TABLET BY MOUTH ONCE WEEK AS DIRECTECD 12 Tab 1    atorvastatin (LIPITOR) 10 mg tablet Take 1 Tab by mouth daily. 90 Tab 1    losartan-hydroCHLOROthiazide (HYZAAR) 50-12.5 mg per tablet Take 1 Tab by mouth daily. 90 Tab 1    amLODIPine (NORVASC) 10 mg tablet Take 1 Tab by mouth daily. 90 Tab 1    metoprolol succinate (TOPROL-XL) 50 mg XL tablet Take 1 Tab by mouth daily. 90 Tab 1    aspirin delayed-release 81 mg tablet Take 1 Tab by mouth daily.  multivit-min/iron/folic/lutein (CENTRUM SILVER WOMEN PO) Take 1 Tab by mouth daily. Allergies   Allergen Reactions    Metronidazole Itching       Objective:  Visit Vitals  /80 (BP 1 Location: Right upper arm, BP Patient Position: Standing, BP Cuff Size: Adult)   Pulse 60   Resp 12   Ht 5' 5\" (1.651 m)   Wt 138 lb (62.6 kg)   SpO2 99%   BMI 22.96 kg/m²       Physical Exam:   Constitutional: General Appearance: . Level of Distress: NAD. Psychiatric: Mental Status: normal mood and affect Orientation: to time, place, and person. ,normal eye contact. Head: Head: normocephalic and atraumatic. Eyes: Pupils: PERRLA. Sclerae: non-icteric. Neck: Neck: supple, trachea midline, and no masses. Lymph Nodes: no cervical LAD. Thyroid: no enlargement or nodules and non-tender. Lungs: Respiratory effort: no dyspnea. Auscultation: no wheezing, rales/crackles, or rhonchi and breath sounds normal and good air movement. Cardiovascular: Apical Impulse: not displaced. Heart Auscultation: normal S1 and S2; no murmurs, rubs, or gallops; and RRR. Neck vessels: no carotid bruits. Pulses including femoral / pedal: normal throughout. Abdomen: Bowel Sounds: normal. Inspection and Palpation: no tenderness, guarding, or masses and soft and non-distended. Liver: non-tender and no hepatomegaly.  Spleen: non-tender and no splenomegaly. Musculoskeletal[de-identified] Extremities: no edema,no varicosities. No Calf tenderness. Neurologic: Gait and Station: normal gait and station. Motor Strength normal right and left. Skin: Inspection and palpation: no rash, lesions, or ulcer. Results for orders placed or performed in visit on 01/22/21   CBC WITH AUTOMATED DIFF   Result Value Ref Range    WBC 7.7 3.4 - 10.8 x10E3/uL    RBC 4.56 3.77 - 5.28 x10E6/uL    HGB 12.6 11.1 - 15.9 g/dL    HCT 37.2 34.0 - 46.6 %    MCV 82 79 - 97 fL    MCH 27.6 26.6 - 33.0 pg    MCHC 33.9 31.5 - 35.7 g/dL    RDW 13.8 11.7 - 15.4 %    PLATELET 665 478 - 701 x10E3/uL    NEUTROPHILS 75 Not Estab. %    Lymphocytes 17 Not Estab. %    MONOCYTES 6 Not Estab. %    EOSINOPHILS 1 Not Estab. %    BASOPHILS 1 Not Estab. %    ABS. NEUTROPHILS 5.8 1.4 - 7.0 x10E3/uL    Abs Lymphocytes 1.3 0.7 - 3.1 x10E3/uL    ABS. MONOCYTES 0.5 0.1 - 0.9 x10E3/uL    ABS. EOSINOPHILS 0.1 0.0 - 0.4 x10E3/uL    ABS. BASOPHILS 0.1 0.0 - 0.2 x10E3/uL    IMMATURE GRANULOCYTES 0 Not Estab. %    ABS. IMM. GRANS. 0.0 0.0 - 0.1 R06C2/DO   METABOLIC PANEL, COMPREHENSIVE   Result Value Ref Range    Glucose 109 (H) 65 - 99 mg/dL    BUN 16 8 - 27 mg/dL    Creatinine 1.12 (H) 0.57 - 1.00 mg/dL    GFR est non-AA 47 (L) >59 mL/min/1.73    GFR est AA 54 (L) >59 mL/min/1.73    BUN/Creatinine ratio 14 12 - 28    Sodium 141 134 - 144 mmol/L    Potassium 3.1 (L) 3.5 - 5.2 mmol/L    Chloride 97 96 - 106 mmol/L    CO2 28 20 - 29 mmol/L    Calcium 9.6 8.7 - 10.3 mg/dL    Protein, total 7.0 6.0 - 8.5 g/dL    Albumin 4.3 3.7 - 4.7 g/dL    GLOBULIN, TOTAL 2.7 1.5 - 4.5 g/dL    A-G Ratio 1.6 1.2 - 2.2    Bilirubin, total 0.3 0.0 - 1.2 mg/dL    Alk.  phosphatase 68 39 - 117 IU/L    AST (SGOT) 17 0 - 40 IU/L    ALT (SGPT) 13 0 - 32 IU/L   LIPID PANEL   Result Value Ref Range    Cholesterol, total 255 (H) 100 - 199 mg/dL    Triglyceride 95 0 - 149 mg/dL    HDL Cholesterol 70 >39 mg/dL    VLDL, calculated 16 5 - 40 mg/dL    LDL, calculated 169 (H) 0 - 99 mg/dL   TSH 3RD GENERATION   Result Value Ref Range    TSH 1.380 0.450 - 4.500 uIU/mL   VITAMIN B12   Result Value Ref Range    Vitamin B12 710 232 - 1,245 pg/mL   VITAMIN D, 25 HYDROXY   Result Value Ref Range    VITAMIN D, 25-HYDROXY 31.0 30.0 - 100.0 ng/mL       Assessment/Plan:      ICD-10-CM ICD-9-CM    1. Essential hypertension  I10 401.9 CBC WITH AUTOMATED DIFF      METABOLIC PANEL, COMPREHENSIVE      TSH 3RD GENERATION   2. Pure hypercholesterolemia  E78.00 272.0 LIPID PANEL   3. Situational depression  F43.21 309.0    4. Hypokalemia  E87.6 276.8    5. Situational anxiety  F41.8 300.09    6. History of panic attacks  Z86.59 V11.8      Orders Placed This Encounter    CBC WITH AUTOMATED DIFF    METABOLIC PANEL, COMPREHENSIVE    LIPID PANEL    TSH 3RD GENERATION    atorvastatin (LIPITOR) 10 mg tablet     Sig: Take 1 Tablet by mouth daily. Dispense:  90 Tablet     Refill:  1    sertraline (ZOLOFT) 50 mg tablet     Sig: Take 1 Tablet by mouth daily. Dispense:  90 Tablet     Refill:  1       Hypertension initially reading was slightly up but after resting and reassuring, blood pressure readings were normal I will not make changes in the medicines today she came with her  I had a long discussion regarding prevention of heart disease and stroke by controlling blood pressure cholesterol, and continued on losartan hydrochlorothiazide 50/12.5 mg/day, amlodipine 10 mg/day and metoprolol ER 50 mg/day. Labs ordered. Diet low in sodium and DASH diet. Educational handouts provided. Hypercholesteremia. Lipid profile was elevated somehow it seems like she did not start atorvastatin 10 mg so started back on atorvastatin 10 mg at bedtime to prevent atherosclerotic, disease including peripheral vascular disease and atherosclerotic heart disease and TIA or stroke. Diet low in fat. Osteopenia continued on alendronate 35 mg once a week.     History of anxiety with panic attack and mild situational depression. According to her  since she is on low-dose of hydroxyzine and sertraline she is doing much better. She gets depressed seeing everything going in the [address] and world, and explained and  counseling given to have spiritually strong and, to have holistic approach and to have different things to do to keep her mind occupied and not to keep watching news channel continuously to prevent the  reinforcement for the bad news she should have some community showed to watch and possible to continue walking healthy diet more vegetables fruits and continue calcium with vitamin D and multivitamins. In the past she had hypokalemia so labs ordered also. She has good support from her  and very good family life, she has no negative thoughts and very good supportive  and very good, life as a whole that she does not have to worry and counseled her to my best ability. Fasting labs ordered. Follow-up in 3 months for Medicare wellness and regular checkup also. Refills given. Answered all the questions asked by the patient and his  Mr. Oly Samaniego who appreciated. continue present plan, routine labs ordered, call if any problems    There are no Patient Instructions on file for this visit. Follow-up and Dispositions    · Return in about 3 months (around 8/28/2021) for medicare subsequent wellness ,and for f/u htn and marek ,anxiety.

## 2021-05-28 NOTE — PROGRESS NOTES
Chief Complaint   Patient presents with    Follow Up Chronic Condition    Hypertension    Cholesterol Problem    Anxiety     1. Have you been to the ER, urgent care clinic since your last visit? Hospitalized since your last visit? No    2. Have you seen or consulted any other health care providers outside of the [address] [place] [address] since your last visit? Include any pap smears or colon screening.  No    Visit Vitals  BP (!) 147/82 (BP 1 Location: Right upper arm, BP Patient Position: Sitting, BP Cuff Size: Adult)   Pulse 62   Resp 12   Ht 5' 5\" (1.651 m)   Wt 138 lb (62.6 kg)   SpO2 99%   BMI 22.96 kg/m²

## 2021-05-29 LAB
ALBUMIN SERPL-MCNC: 4.4 G/DL (ref 3.7–4.7)
ALBUMIN/GLOB SERPL: 1.8 {RATIO} (ref 1.2–2.2)
ALP SERPL-CCNC: 69 IU/L (ref 48–121)
ALT SERPL-CCNC: 12 IU/L (ref 0–32)
AST SERPL-CCNC: 18 IU/L (ref 0–40)
BASOPHILS # BLD AUTO: 0.1 X10E3/UL (ref 0–0.2)
BASOPHILS NFR BLD AUTO: 1 %
BILIRUB SERPL-MCNC: 0.2 MG/DL (ref 0–1.2)
BUN SERPL-MCNC: 20 MG/DL (ref 8–27)
BUN/CREAT SERPL: 18 (ref 12–28)
CALCIUM SERPL-MCNC: 9.6 MG/DL (ref 8.7–10.3)
CHLORIDE SERPL-SCNC: 98 MMOL/L (ref 96–106)
CHOLEST SERPL-MCNC: 280 MG/DL (ref 100–199)
CO2 SERPL-SCNC: 28 MMOL/L (ref 20–29)
CREAT SERPL-MCNC: 1.14 MG/DL (ref 0.57–1)
EOSINOPHIL # BLD AUTO: 0.2 X10E3/UL (ref 0–0.4)
EOSINOPHIL NFR BLD AUTO: 2 %
ERYTHROCYTE [DISTWIDTH] IN BLOOD BY AUTOMATED COUNT: 14.4 % (ref 11.7–15.4)
GLOBULIN SER CALC-MCNC: 2.5 G/DL (ref 1.5–4.5)
GLUCOSE SERPL-MCNC: 98 MG/DL (ref 65–99)
HCT VFR BLD AUTO: 38.8 % (ref 34–46.6)
HDLC SERPL-MCNC: 63 MG/DL
HGB BLD-MCNC: 12.2 G/DL (ref 11.1–15.9)
IMM GRANULOCYTES # BLD AUTO: 0 X10E3/UL (ref 0–0.1)
IMM GRANULOCYTES NFR BLD AUTO: 0 %
LDLC SERPL CALC-MCNC: 199 MG/DL (ref 0–99)
LYMPHOCYTES # BLD AUTO: 1.9 X10E3/UL (ref 0.7–3.1)
LYMPHOCYTES NFR BLD AUTO: 22 %
MCH RBC QN AUTO: 25.3 PG (ref 26.6–33)
MCHC RBC AUTO-ENTMCNC: 31.4 G/DL (ref 31.5–35.7)
MCV RBC AUTO: 81 FL (ref 79–97)
MONOCYTES # BLD AUTO: 0.6 X10E3/UL (ref 0.1–0.9)
MONOCYTES NFR BLD AUTO: 7 %
NEUTROPHILS # BLD AUTO: 6 X10E3/UL (ref 1.4–7)
NEUTROPHILS NFR BLD AUTO: 68 %
PLATELET # BLD AUTO: 305 X10E3/UL (ref 150–450)
POTASSIUM SERPL-SCNC: 3.5 MMOL/L (ref 3.5–5.2)
PROT SERPL-MCNC: 6.9 G/DL (ref 6–8.5)
RBC # BLD AUTO: 4.82 X10E6/UL (ref 3.77–5.28)
SODIUM SERPL-SCNC: 141 MMOL/L (ref 134–144)
TRIGL SERPL-MCNC: 103 MG/DL (ref 0–149)
TSH SERPL DL<=0.005 MIU/L-ACNC: 1.71 UIU/ML (ref 0.45–4.5)
VLDLC SERPL CALC-MCNC: 18 MG/DL (ref 5–40)
WBC # BLD AUTO: 8.8 X10E3/UL (ref 3.4–10.8)

## 2021-05-29 NOTE — PROGRESS NOTES
Please call Ms. Heidy Pelaez that her hemoglobin is very good her potassium is 3.5, she should take potassium 10 mEq 1 pill twice a day if possible if not possible at least to take once a day, I will send a prescription to the pharmacy after you discuss let me know.     Her cholesterol is up and when they came on Friday I did not see atorvastatin in the back so I had sent prescription, atorvastatin 10 mg at  pharmacy and they should start it, they should stop eating fried food, food containing oil butter cheese snacks, and if needed I will increase atorvastatin to 20 mg at bedtime, and I will repeat cholesterol in 3 months,Her thyroid function is normal.      She should increase the intake of potassium containing diet like banana or in spinach

## 2021-06-15 RX ORDER — LOSARTAN POTASSIUM AND HYDROCHLOROTHIAZIDE 12.5; 5 MG/1; MG/1
TABLET ORAL
Qty: 90 TABLET | Refills: 1 | Status: SHIPPED | OUTPATIENT
Start: 2021-06-15 | End: 2021-11-28

## 2021-06-15 RX ORDER — AMLODIPINE BESYLATE 10 MG/1
TABLET ORAL
Qty: 90 TABLET | Refills: 1 | Status: SHIPPED | OUTPATIENT
Start: 2021-06-15 | End: 2021-11-28

## 2021-07-15 RX ORDER — METOPROLOL SUCCINATE 50 MG/1
TABLET, EXTENDED RELEASE ORAL
Qty: 90 TABLET | Refills: 1 | Status: SHIPPED | OUTPATIENT
Start: 2021-07-15 | End: 2021-11-28

## 2021-08-31 ENCOUNTER — OFFICE VISIT (OUTPATIENT)
Dept: INTERNAL MEDICINE CLINIC | Age: 81
End: 2021-08-31
Payer: MEDICARE

## 2021-08-31 VITALS
RESPIRATION RATE: 12 BRPM | HEIGHT: 65 IN | SYSTOLIC BLOOD PRESSURE: 144 MMHG | OXYGEN SATURATION: 99 % | BODY MASS INDEX: 23.46 KG/M2 | DIASTOLIC BLOOD PRESSURE: 80 MMHG | WEIGHT: 140.8 LBS | HEART RATE: 64 BPM

## 2021-08-31 DIAGNOSIS — F43.21 SITUATIONAL DEPRESSION: ICD-10-CM

## 2021-08-31 DIAGNOSIS — I10 ESSENTIAL HYPERTENSION: ICD-10-CM

## 2021-08-31 DIAGNOSIS — R68.89 FORGETFULNESS: ICD-10-CM

## 2021-08-31 DIAGNOSIS — Z12.31 ENCOUNTER FOR SCREENING MAMMOGRAM FOR BREAST CANCER: ICD-10-CM

## 2021-08-31 DIAGNOSIS — M85.88 OSTEOPENIA OF LUMBAR SPINE: ICD-10-CM

## 2021-08-31 DIAGNOSIS — F41.8 SITUATIONAL ANXIETY: ICD-10-CM

## 2021-08-31 DIAGNOSIS — Z00.00 MEDICARE ANNUAL WELLNESS VISIT, SUBSEQUENT: ICD-10-CM

## 2021-08-31 DIAGNOSIS — R41.3 MEMORY IMPAIRMENT: ICD-10-CM

## 2021-08-31 DIAGNOSIS — Z86.59 HISTORY OF PANIC ATTACKS: ICD-10-CM

## 2021-08-31 DIAGNOSIS — E78.00 PURE HYPERCHOLESTEROLEMIA: ICD-10-CM

## 2021-08-31 PROCEDURE — G8536 NO DOC ELDER MAL SCRN: HCPCS | Performed by: INTERNAL MEDICINE

## 2021-08-31 PROCEDURE — 99213 OFFICE O/P EST LOW 20 MIN: CPT | Performed by: INTERNAL MEDICINE

## 2021-08-31 PROCEDURE — G8400 PT W/DXA NO RESULTS DOC: HCPCS | Performed by: INTERNAL MEDICINE

## 2021-08-31 PROCEDURE — G8754 DIAS BP LESS 90: HCPCS | Performed by: INTERNAL MEDICINE

## 2021-08-31 PROCEDURE — G8427 DOCREV CUR MEDS BY ELIG CLIN: HCPCS | Performed by: INTERNAL MEDICINE

## 2021-08-31 PROCEDURE — G0439 PPPS, SUBSEQ VISIT: HCPCS | Performed by: INTERNAL MEDICINE

## 2021-08-31 PROCEDURE — 1090F PRES/ABSN URINE INCON ASSESS: CPT | Performed by: INTERNAL MEDICINE

## 2021-08-31 PROCEDURE — G8753 SYS BP > OR = 140: HCPCS | Performed by: INTERNAL MEDICINE

## 2021-08-31 PROCEDURE — G9717 DOC PT DX DEP/BP F/U NT REQ: HCPCS | Performed by: INTERNAL MEDICINE

## 2021-08-31 PROCEDURE — 1100F PTFALLS ASSESS-DOCD GE2>/YR: CPT | Performed by: INTERNAL MEDICINE

## 2021-08-31 PROCEDURE — 3288F FALL RISK ASSESSMENT DOCD: CPT | Performed by: INTERNAL MEDICINE

## 2021-08-31 PROCEDURE — G8420 CALC BMI NORM PARAMETERS: HCPCS | Performed by: INTERNAL MEDICINE

## 2021-08-31 RX ORDER — HYDROXYZINE HYDROCHLORIDE 10 MG/1
10 TABLET, FILM COATED ORAL
COMMUNITY
End: 2021-10-05 | Stop reason: SDUPTHER

## 2021-08-31 RX ORDER — POTASSIUM CHLORIDE 750 MG/1
10 TABLET, EXTENDED RELEASE ORAL DAILY
Qty: 90 TABLET | Refills: 1 | Status: SHIPPED | OUTPATIENT
Start: 2021-08-31 | End: 2022-02-20

## 2021-08-31 RX ORDER — ATORVASTATIN CALCIUM 10 MG/1
10 TABLET, FILM COATED ORAL
Qty: 90 TABLET | Refills: 1 | Status: SHIPPED | OUTPATIENT
Start: 2021-08-31 | End: 2022-05-17

## 2021-08-31 NOTE — PROGRESS NOTES
Chief Complaint   Patient presents with   [place] Annual Wellness Visit     1. Have you been to the ER, urgent care clinic since your last visit? Hospitalized since your last visit? No    2. Have you seen or consulted any other health care providers outside of the [address] [place] [address] since your last visit? Include any pap smears or colon screening. No    .  Visit Vitals  BP (!) 144/80 (BP 1 Location: Right upper arm, BP Patient Position: Sitting, BP Cuff Size: Adult)   Pulse 64   Resp 12   Ht 5' 5\" (1.651 m)   Wt 140 lb 12.8 oz (63.9 kg)   SpO2 99%   BMI 23.43 kg/m²       This is the Subsequent Medicare Annual Wellness Exam, performed 12 months or more after the Initial AWV or the last Subsequent AWV    I have reviewed the patient's medical history in detail and updated the computerized patient record. History of present illness for her chronic medical problems   Ms. Rodri Neff is here for subsequent Medicare wellness visit in addition to regular follow-up for her chronic medical problems. She came with her . I called her  in the room. Patient has not started taking atorvastatin 10 mg at bedtime and did not increase to 20 mg at bedtime she had her labs done in May 2021. Today her blood pressure is slightly on upper side but it remains normal at home. She could not recall the three words. She has forgetfulness and her  also agreed that he does not allow her to drive because she forgets the direction and agreed to be evaluated by neurologist rather than starting her on medications. Her potassium was 3.5 she did not start potassium supplementation started today. Her vitamin B12 and vitamin D level was normal in January. Thyroid function was normal in January. She has history of anxiety with panic attacks and sometimes situational sadness then she has responded very well to sertraline 50 mg/day and hydroxyzine as needed she is very pleasant personality.          Physical Activity:     Days of Exercise per Week:     Minutes of Exercise per Session:        Physical Exam  Constitutional:       Appearance: Normal appearance. Cardiovascular:      Rate and Rhythm: Normal rate and regular rhythm. Pulses: Normal pulses. Heart sounds: Normal heart sounds. Pulmonary:      Effort: Pulmonary effort is normal.      Breath sounds: Normal breath sounds. Abdominal:      General: Bowel sounds are normal.      Palpations: Abdomen is soft. Musculoskeletal:      Cervical back: Normal range of motion and neck supple. Skin:     General: Skin is warm. Neurological:      General: No focal deficit present. Mental Status: She is alert and oriented to person, place, and time. Psychiatric:         Mood and Affect: Mood normal.         Behavior: Behavior normal.         Thought Content: Thought content normal.         Judgment: Judgment normal.           Assessment/Plan   Education and counseling provided:  Are appropriate based on today's review and evaluation  End-of-Life planning (with patient's consent)  Pneumococcal Vaccine  Influenza Vaccine  Hepatitis B Vaccine  Screening Mammography  Screening Pap and pelvic (covered once every 2 years)  Colorectal cancer screening tests  Cardiovascular screening blood test  Bone mass measurement (DEXA)  Screening for glaucoma  Diabetes screening test    1. Medicare annual wellness visit, subsequent  2. Encounter for screening mammogram for breast cancer  -     [facility] 3D AMI W MAMMO BI SCREENING INCL CAD; Future  3. Essential hypertension  -     CBC WITH AUTOMATED DIFF  -     METABOLIC PANEL, COMPREHENSIVE  4. Pure hypercholesterolemia  -     LIPID PANEL  5. Memory impairment  -     REFERRAL TO NEUROLOGY  6. Situational depression  -     REFERRAL TO NEUROLOGY  7. Osteopenia of lumbar spine  -     DEXA BONE DENSITY STUDY AXIAL; Future  8.  Situational anxiety  -     REFERRAL TO NEUROLOGY  9. Forgetfulness  -     REFERRAL TO NEUROLOGY  10. History of panic attacks                                         Review of Systems   Constitutional: Negative. HENT: Negative for sinus pain and sore throat. Eyes: Negative for blurred vision. Respiratory: Negative for cough, shortness of breath and wheezing. Cardiovascular: Negative. Gastrointestinal: Negative. Genitourinary: Negative for hematuria and urgency. Musculoskeletal: Negative for back pain and myalgias. Neurological: Negative for dizziness, tingling, sensory change and headaches. Endo/Heme/Allergies: Negative for polydipsia. Psychiatric/Behavioral: Positive for memory loss. Negative for hallucinations, substance abuse and suicidal ideas. History of anxiety and panic attacks mild situational sadness         Depression Risk Factor Screening     3 most recent PHQ Screens 8/31/2021   Little interest or pleasure in doing things Not at all   Feeling down, depressed, irritable, or hopeless Not at all   Total Score PHQ 2 0       Alcohol Risk Screen    Do you average more than 1 drink per night or more than 7 drinks a week:  No    On any one occasion in the past three months have you have had more than 3 drinks containing alcohol:  No        Functional Ability and Level of Safety    Hearing: Hearing is good. Activities of Daily Living: The home contains: grab bars and rugs  Patient does total self care      Ambulation: with no difficulty     Fall Risk:  Fall Risk Assessment, last 12 mths 8/31/2021   Able to walk? Yes   Fall in past 12 months? 0   Do you feel unsteady? 0   Are you worried about falling 0   Number of falls in past 12 months -   Fall with injury?  -      Abuse Screen:  Patient is not abused       Cognitive Screening    Has your family/caregiver stated any concerns about your memory: no     Cognitive Screening: Normal - Clock Drawing Test, Mini Cog Test, Verbal Fluency Test     Health Maintenance Due     Health Maintenance Due   Topic Date Due    Hepatitis C Screening  Never done  DTaP/Tdap/Td series (1 - Tdap) Never done    Shingrix Vaccine Age 50> (1 of 2) Never done    Bone Densitometry (Dexa) Screening  Never done       Patient Care Team   Patient Care Team:  Hudson Salvador MD as PCP - General (Internal Medicine)  Hudson Salvador MD as PCP - [facility] Empaneled Provider    History     Patient Active Problem List   Diagnosis Code    Essential hypertension I10    Fatigue R53.83    Osteopenia M85.80    Pure hypercholesterolemia E78.00    Situational anxiety F41.8    Weight loss R63.4    Situational depression F43.21    Forgetfulness R68.89    History of panic attacks Z86.59    Vitamin D deficiency E55.9    Hypokalemia E87.6    Memory impairment R41.3    Encounter for screening mammogram for breast cancer Z12.31    Medicare annual wellness visit, subsequent Z00.00     Past Medical History:   Diagnosis Date    Anxiety     Asthma     Hypercholesterolemia     Hypertension     Osteoporosis     Situational depression 9/22/2020      Past Surgical History:   Procedure Laterality Date    HX COLONOSCOPY  11/10/2015    HX HYSTERECTOMY      HX THYROIDECTOMY       Current Outpatient Medications   Medication Sig Dispense Refill    hydrOXYzine HCL (ATARAX) 10 mg tablet Take 10 mg by mouth two (2) times daily as needed.  atorvastatin (LIPITOR) 10 mg tablet Take 1 Tablet by mouth nightly. 90 Tablet 1    potassium chloride (KLOR-CON) 10 mEq tablet Take 1 Tablet by mouth daily. 90 Tablet 1    metoprolol succinate (TOPROL-XL) 50 mg XL tablet TAKE 1 TABLET BY MOUTH EVERY DAY 90 Tablet 1    losartan-hydroCHLOROthiazide (HYZAAR) 50-12.5 mg per tablet TAKE 1 TABLET BY MOUTH EVERY DAY 90 Tablet 1    amLODIPine (NORVASC) 10 mg tablet TAKE 1 TABLET BY MOUTH EVERY DAY 90 Tablet 1    sertraline (ZOLOFT) 50 mg tablet Take 1 Tablet by mouth daily.  90 Tablet 1    alendronate (FOSAMAX) 35 mg tablet TAKE 1 TABLET BY MOUTH ONCE WEEK AS DIRECTECD 12 Tab 1    aspirin delayed-release 81 mg tablet Take 1 Tab by mouth daily.  multivit-min/iron/folic/lutein (CENTRUM SILVER WOMEN PO) Take 1 Tab by mouth daily.  atorvastatin (LIPITOR) 10 mg tablet Take 1 Tab by mouth daily. (Patient not taking: Reported on 8/31/2021) 90 Tab 1     Allergies   Allergen Reactions    Metronidazole Itching       Family History   Problem Relation Age of Onset    Alzheimer Mother     Dementia Mother     Heart Disease Maternal Grandmother      Social History     Tobacco Use    Smoking status: Former Smoker     Packs/day: 0.25     Years: 10.00     Pack years: 2.50     Types: Cigarettes     Quit date: 2011     Years since quitting: 10.6    Smokeless tobacco: Former User   Substance Use Topics    Alcohol use: Yes     Comment: occ           She is a dual visit for a subsequent Medicare wellness visit and follow-up for chronic medical problems. Medicare wellness visit. She is up to date for her bone density done in February 2019 showing osteopenia at both hip joint and lumbar spine taking bisphosphonate 35 mg once a week and repeat bone density ordered. Recommended to make DNR/DNI. She is due for mammogram so mammogram ordered. Hypertension slightly running on upper side of normal range  She is status post hysterectomy. She has done her colonoscopy in November 2018 and was recommended to repeat in 5 years which was showing diverticulitis/diverticulosis in sigmoid and descending colon with benign polyp. Recommended to have eye examination. She has situational anxiety with situational depression and panic attacks taking sertraline and hydroxyzine. No negative thoughts no signs and symptoms of suicidal or homicidal she has excellent support from her . Mini cog assessment she could not recall the later on she could recall the two words however she could not answer all three words and she had normal clock drawing test.    She has normal hearing and normal vision. Independent for ADL and IADL. She has no advanced directory. Discussed with her . For vaccinations. She is overdue for second dose of shingles vaccine recommended to get it. She will take flu vaccine in fall. She has taken flu vaccine in 2020. She is up-to-date for Prevnar 13 and Pneumovax 23 taken in 2018 and 2019. She has taken Covid  vaccines with two doses are taken in February with moderna. She has been due for tetanus shot every 10 yearly. Regular follow-up visit [id number]. Hypertension running slightly on upper side of normal range. She remains anxious it is normal at home so continued on losartan hydrochlorothiazide 50/12.5 mg once a day, metoprolol ER 50 mg/day, amlodipine 10 mg/day. Diet low in sodium. Hypercholesteremia she did not start taking atorvastatin started to begin with 10 mg at bedtime repeat lipid profile in 3 months if high I will increase to 20 mg at bedtime lipid profile was elevated uncontrolled in May 2021      I have noticed that she is forgetful and she could not fill his mini cog assessment with three words recall is one also has noticed that she is forgetful and not able to drive so he is not allowing her to drive I referred her to neurologist,For neurological evaluation. Situational depression with situational anxiety and panic attacks she has responded very well to sertraline and hydroxyzine low-dose. No negative thoughts. No signs symptoms of suicide or homicide. Excellent support from her . Osteopenia of lumbar spine and both hip joint taking alendronate 35 mg once a week bone density ordered. Labs to be done in 3 months. Follow-up in 3 months. Refills given. Her potassium was around 3.5 recommended to keep her potassium around four to prevent the muscle cramps. Potassium 10 mEq once a day and diet rich in potassium. Referral given. Labs ordered. Refills given.   Answered all the question asked by patient and her .                                               Trixie Estrada

## 2021-08-31 NOTE — PATIENT INSTRUCTIONS

## 2021-09-16 ENCOUNTER — HOSPITAL ENCOUNTER (OUTPATIENT)
Dept: MAMMOGRAPHY | Age: 81
Discharge: HOME | End: 2021-09-16
Attending: INTERNAL MEDICINE
Payer: MEDICARE

## 2021-09-16 DIAGNOSIS — Z12.31 ENCOUNTER FOR SCREENING MAMMOGRAM FOR BREAST CANCER: ICD-10-CM

## 2021-09-16 DIAGNOSIS — M85.88 OSTEOPENIA OF LUMBAR SPINE: ICD-10-CM

## 2021-09-16 PROCEDURE — 77063 BREAST TOMOSYNTHESIS BI: CPT

## 2021-09-16 PROCEDURE — 77080 DXA BONE DENSITY AXIAL: CPT

## 2021-09-16 NOTE — PROGRESS NOTES
She has osteopenia which is an early stage of osteoporosis she may not understand so I explained to her  who came with her that she can be given alendronate 35 mg once a week with full glass of water and continue vitamin D 1000/day, calcium 600 mg/day and weightbearing exercise to prevent the bone to going osteoporosis if she agrees let me know

## 2021-09-22 RX ORDER — ALENDRONATE SODIUM 35 MG/1
TABLET ORAL
Qty: 12 TABLET | Refills: 1 | Status: SHIPPED | OUTPATIENT
Start: 2021-09-22 | End: 2022-03-19

## 2021-09-30 PROBLEM — Z00.00 MEDICARE ANNUAL WELLNESS VISIT, SUBSEQUENT: Status: RESOLVED | Noted: 2021-08-31 | Resolved: 2021-09-30

## 2021-09-30 PROBLEM — Z12.31 ENCOUNTER FOR SCREENING MAMMOGRAM FOR BREAST CANCER: Status: RESOLVED | Noted: 2021-08-31 | Resolved: 2021-09-30

## 2021-10-05 RX ORDER — HYDROXYZINE HYDROCHLORIDE 10 MG/1
10 TABLET, FILM COATED ORAL
Qty: 180 TABLET | Refills: 0 | Status: SHIPPED | OUTPATIENT
Start: 2021-10-05 | End: 2021-11-28

## 2021-10-21 ENCOUNTER — TELEPHONE (OUTPATIENT)
Dept: INTERNAL MEDICINE CLINIC | Age: 81
End: 2021-10-21

## 2021-10-21 NOTE — TELEPHONE ENCOUNTER
----- Message from Pancho Hassan sent at 10/21/2021  2:03 PM EDT -----  Subject: Appointment Request    Reason for Call: Urgent (Patient Request) Existing Condition Follow    QUESTIONS  Type of Appointment? Established Patient  Reason for appointment request? Other - Patients  wants doctor to   reach out   Additional Information for Provider? Patient wants doctor to reach he said   he would save questions for the upcoming appointment for 12/1 but would   like it if Dr. Ruchi Sanchez could reach out to them to discuss a few   things. ---------------------------------------------------------------------------  --------------  Shawn Marianoole INFO  What is the best way for the office to contact you? OK to leave message on   voicemail  Preferred Call Back Phone Number? [phone]  ---------------------------------------------------------------------------  --------------  SCRIPT ANSWERS  Relationship to Patient? Other  Representative Name?   Additional information verified (besides Name and Date of Birth)? Address  (Is the patient requesting to be seen urgently for their symptoms?)? Yes  Is this follow up request related to routine Diabetes Management? No  Are you having any new concerns about your existing condition? No  Have you been diagnosed with, awaiting test results for, or told that you   are suspected of having COVID-19 (Coronavirus)? (If patient has tested   negative or was tested as a requirement for work, school, or travel and   not based on symptoms, answer no)? No  Within the past two weeks have you developed any of the following symptoms   (answer no if symptoms have been present longer than 2 weeks or began   more than 2 weeks ago)?  Fever or Chills, Cough, Shortness of breath or   difficulty breathing, Loss of taste or smell, Sore throat, Nasal   congestion, Sneezing or runny nose, Fatigue or generalized body aches   (answer no if pain is specific to a body part e.g. back pain), Diarrhea, Headache? No  Have you had close contact with someone with COVID-19 in the last 14 days? No  (Service Expert  click yes below to proceed with [employer] As Usual   Scheduling)?  Yes

## 2021-10-29 ENCOUNTER — TRANSCRIBE ORDER (OUTPATIENT)
Dept: SCHEDULING | Age: 81
End: 2021-10-29

## 2021-10-29 DIAGNOSIS — G31.84 MILD COGNITIVE IMPAIRMENT: Primary | ICD-10-CM

## 2021-10-29 DIAGNOSIS — R41.3 OTHER AMNESIA: Primary | ICD-10-CM

## 2021-11-18 ENCOUNTER — HOSPITAL ENCOUNTER (OUTPATIENT)
Dept: CT IMAGING | Age: 81
Discharge: HOME | End: 2021-11-18
Attending: PSYCHIATRY & NEUROLOGY
Payer: MEDICARE

## 2021-11-18 DIAGNOSIS — R41.3 OTHER AMNESIA: ICD-10-CM

## 2021-11-18 PROCEDURE — 70450 CT HEAD/BRAIN W/O DYE: CPT

## 2021-11-21 RX ORDER — SERTRALINE HYDROCHLORIDE 50 MG/1
TABLET, FILM COATED ORAL
Qty: 90 TABLET | Refills: 1 | Status: SHIPPED | OUTPATIENT
Start: 2021-11-21 | End: 2022-05-17

## 2021-11-28 RX ORDER — AMLODIPINE BESYLATE 10 MG/1
TABLET ORAL
Qty: 90 TABLET | Refills: 1 | Status: SHIPPED | OUTPATIENT
Start: 2021-11-28 | End: 2022-05-17

## 2021-11-28 RX ORDER — METOPROLOL SUCCINATE 50 MG/1
TABLET, EXTENDED RELEASE ORAL
Qty: 90 TABLET | Refills: 1 | Status: SHIPPED | OUTPATIENT
Start: 2021-11-28 | End: 2022-05-17

## 2021-11-28 RX ORDER — LOSARTAN POTASSIUM AND HYDROCHLOROTHIAZIDE 12.5; 5 MG/1; MG/1
TABLET ORAL
Qty: 90 TABLET | Refills: 1 | Status: SHIPPED | OUTPATIENT
Start: 2021-11-28 | End: 2022-05-17

## 2021-11-28 RX ORDER — HYDROXYZINE HYDROCHLORIDE 10 MG/1
10 TABLET, FILM COATED ORAL
Qty: 180 TABLET | Refills: 0 | Status: SHIPPED | OUTPATIENT
Start: 2021-11-28 | End: 2021-12-01 | Stop reason: ALTCHOICE

## 2021-12-01 ENCOUNTER — OFFICE VISIT (OUTPATIENT)
Dept: INTERNAL MEDICINE CLINIC | Age: 81
End: 2021-12-01
Payer: MEDICARE

## 2021-12-01 VITALS
HEIGHT: 65 IN | BODY MASS INDEX: 22.99 KG/M2 | TEMPERATURE: 98.6 F | OXYGEN SATURATION: 98 % | HEART RATE: 60 BPM | DIASTOLIC BLOOD PRESSURE: 70 MMHG | WEIGHT: 138 LBS | SYSTOLIC BLOOD PRESSURE: 128 MMHG

## 2021-12-01 DIAGNOSIS — F41.8 SITUATIONAL ANXIETY: ICD-10-CM

## 2021-12-01 DIAGNOSIS — F43.21 SITUATIONAL DEPRESSION: ICD-10-CM

## 2021-12-01 DIAGNOSIS — Z86.59 HISTORY OF PANIC ATTACKS: ICD-10-CM

## 2021-12-01 DIAGNOSIS — I10 ESSENTIAL HYPERTENSION: ICD-10-CM

## 2021-12-01 DIAGNOSIS — E78.00 PURE HYPERCHOLESTEROLEMIA: ICD-10-CM

## 2021-12-01 DIAGNOSIS — M85.88 OSTEOPENIA OF LUMBAR SPINE: ICD-10-CM

## 2021-12-01 PROCEDURE — 1090F PRES/ABSN URINE INCON ASSESS: CPT | Performed by: INTERNAL MEDICINE

## 2021-12-01 PROCEDURE — G8399 PT W/DXA RESULTS DOCUMENT: HCPCS | Performed by: INTERNAL MEDICINE

## 2021-12-01 PROCEDURE — 99214 OFFICE O/P EST MOD 30 MIN: CPT | Performed by: INTERNAL MEDICINE

## 2021-12-01 PROCEDURE — G8420 CALC BMI NORM PARAMETERS: HCPCS | Performed by: INTERNAL MEDICINE

## 2021-12-01 PROCEDURE — G8752 SYS BP LESS 140: HCPCS | Performed by: INTERNAL MEDICINE

## 2021-12-01 PROCEDURE — G8536 NO DOC ELDER MAL SCRN: HCPCS | Performed by: INTERNAL MEDICINE

## 2021-12-01 PROCEDURE — 3288F FALL RISK ASSESSMENT DOCD: CPT | Performed by: INTERNAL MEDICINE

## 2021-12-01 PROCEDURE — G8754 DIAS BP LESS 90: HCPCS | Performed by: INTERNAL MEDICINE

## 2021-12-01 PROCEDURE — 1100F PTFALLS ASSESS-DOCD GE2>/YR: CPT | Performed by: INTERNAL MEDICINE

## 2021-12-01 PROCEDURE — G8427 DOCREV CUR MEDS BY ELIG CLIN: HCPCS | Performed by: INTERNAL MEDICINE

## 2021-12-01 PROCEDURE — G9717 DOC PT DX DEP/BP F/U NT REQ: HCPCS | Performed by: INTERNAL MEDICINE

## 2021-12-01 NOTE — PROGRESS NOTES
1. Have you been to the ER, urgent care clinic since your last visit? Hospitalized since your last visit? No    2. Have you seen or consulted any other health care providers outside of the [address] [place] [address] since your last visit? Include any pap smears or colon screening.  Has seen Dr. Cora Flores - neurologist, Dr. Caitlin Chairez - cardiologist    Chief Complaint   Patient presents with    Follow-up     3 month fu     Visit Vitals  BP (!) 149/73 (BP 1 Location: Left upper arm, BP Patient Position: Sitting)   Pulse 60   Temp 98.6 °F (37 °C) (Oral)   Ht 5' 5\" (1.651 m)   Wt 138 lb (62.6 kg)   SpO2 98%   BMI 22.96 kg/m²

## 2021-12-01 NOTE — PROGRESS NOTES
Jimmy Stokes is a 80 y.o. female and presents with Follow-up (3 month fu) and Follow Up Chronic Condition      Ms. Gregory Martínez came for follow-up with her . Her blood pressure is well controlled after resting and after taking her jacket off. She is compliant for medicines. I checked the medicine list with her  she is not taking atorvastatin 10 mg at bedtime. She had hypercholesterolemia and last lipid profile was checked in month of May. She has osteopenia taking alendronate with multivitamins. She does not have anxiety or panic attacks and she has no depressed mood at all. According to her  she feels sleepy after taking medicine. I recommended to stop taking hydroxyzine continued on sertraline which is keeping her, very joyful. She has a very good support from her  she is independent at her age. She is taking potassium supplementation she has not done labs that I ordered so labs ordered again,. She is up to date with Covid vaccine and booster dose. Review of Systems    Review of Systems   Constitutional: Negative. HENT: Negative for sore throat. Eyes: Negative for blurred vision. Respiratory: Negative for cough, wheezing and stridor. Cardiovascular: Negative. Gastrointestinal: Negative. Genitourinary: Negative. Musculoskeletal: Negative for falls and myalgias. Neurological: Negative for dizziness, sensory change and headaches. Psychiatric/Behavioral: Negative for depression, substance abuse and suicidal ideas. The patient is not nervous/anxious.          Past Medical History:   Diagnosis Date    Anxiety     Asthma     Hypercholesterolemia     Hypertension     Osteoporosis     Situational depression 9/22/2020     Past Surgical History:   Procedure Laterality Date    HX COLONOSCOPY  11/10/2015    HX HYSTERECTOMY      HX THYROIDECTOMY       Social History     Socioeconomic History    Marital status:    Tobacco Use    Smoking status: Former Smoker     Packs/day: 0.25     Years: 10.00     Pack years: 2.50     Types: Cigarettes     Quit date: 2011     Years since quitting: 10.9    Smokeless tobacco: Former User   Substance and Sexual Activity    Alcohol use: Yes     Comment: occ     Family History   Problem Relation Age of Onset    Alzheimer's Disease Mother     Dementia Mother     Heart Disease Maternal Grandmother      Current Outpatient Medications   Medication Sig Dispense Refill    losartan-hydroCHLOROthiazide (HYZAAR) 50-12.5 mg per tablet TAKE 1 TABLET BY MOUTH EVERY DAY 90 Tablet 1    metoprolol succinate (TOPROL-XL) 50 mg XL tablet TAKE 1 TABLET BY MOUTH EVERY DAY 90 Tablet 1    amLODIPine (NORVASC) 10 mg tablet TAKE 1 TABLET BY MOUTH EVERY DAY 90 Tablet 1    sertraline (ZOLOFT) 50 mg tablet TAKE 1 TABLET BY MOUTH EVERY DAY 90 Tablet 1    alendronate (FOSAMAX) 35 mg tablet TAKE 1 TABLET BY MOUTH ONCE WEEK AS DIRECTECD 12 Tablet 1    atorvastatin (LIPITOR) 10 mg tablet Take 1 Tablet by mouth nightly. 90 Tablet 1    potassium chloride (KLOR-CON) 10 mEq tablet Take 1 Tablet by mouth daily. 90 Tablet 1    aspirin delayed-release 81 mg tablet Take 1 Tab by mouth daily.  multivit-min/iron/folic/lutein (CENTRUM SILVER WOMEN PO) Take 1 Tab by mouth daily.  atorvastatin (LIPITOR) 10 mg tablet Take 1 Tab by mouth daily. (Patient not taking: Reported on 8/31/2021) 90 Tab 1     Allergies   Allergen Reactions    Metronidazole Itching       Objective:  Visit Vitals  /70   Pulse 60   Temp 98.6 °F (37 °C) (Oral)   Ht 5' 5\" (1.651 m)   Wt 138 lb (62.6 kg)   SpO2 98%   BMI 22.96 kg/m²       Physical Exam:   Constitutional: General Appearance: Pleasant. Level of Distress: NAD. Psychiatric: Mental Status: normal mood and affect Orientation: to time, place, and person. ,normal eye contact. Head: Head: normocephalic and atraumatic. Eyes: Pupils: PERRLA. Sclerae: non-icteric.    Neck: Neck: supple, trachea midline, and no masses. Lymph Nodes: no cervical LAD. Thyroid: no enlargement or nodules and non-tender. Lungs: Respiratory effort: no dyspnea. Auscultation: no wheezing, rales/crackles, or rhonchi and breath sounds normal and good air movement. Cardiovascular: Apical Impulse: not displaced. Heart Auscultation: normal S1 and S2; no murmurs, rubs, or gallops; and RRR. Neck vessels: no carotid bruits. Pulses including femoral / pedal: normal throughout. Abdomen: Bowel Sounds: normal. Inspection and Palpation: no tenderness, guarding, or masses and soft and non-distended. Liver: non-tender and no hepatomegaly. Spleen: non-tender and no splenomegaly. Musculoskeletal[de-identified] Extremities: no edema,no varicosities. No Calf tenderness. Neurologic: Gait and Station: normal gait and station. Motor Strength normal right and left. Skin: Inspection and palpation: no rash, lesions, or ulcer. Results for orders placed or performed in visit on 05/28/21   CBC WITH AUTOMATED DIFF   Result Value Ref Range    WBC 8.8 3.4 - 10.8 x10E3/uL    RBC 4.82 3.77 - 5.28 x10E6/uL    HGB 12.2 11.1 - 15.9 g/dL    HCT 38.8 34.0 - 46.6 %    MCV 81 79 - 97 fL    MCH 25.3 (L) 26.6 - 33.0 pg    MCHC 31.4 (L) 31.5 - 35.7 g/dL    RDW 14.4 11.7 - 15.4 %    PLATELET 079 649 - 649 x10E3/uL    NEUTROPHILS 68 Not Estab. %    Lymphocytes 22 Not Estab. %    MONOCYTES 7 Not Estab. %    EOSINOPHILS 2 Not Estab. %    BASOPHILS 1 Not Estab. %    ABS. NEUTROPHILS 6.0 1.4 - 7.0 x10E3/uL    Abs Lymphocytes 1.9 0.7 - 3.1 x10E3/uL    ABS. MONOCYTES 0.6 0.1 - 0.9 x10E3/uL    ABS. EOSINOPHILS 0.2 0.0 - 0.4 x10E3/uL    ABS. BASOPHILS 0.1 0.0 - 0.2 x10E3/uL    IMMATURE GRANULOCYTES 0 Not Estab. %    ABS. IMM.  GRANS. 0.0 0.0 - 0.1 R58I2/RA   METABOLIC PANEL, COMPREHENSIVE   Result Value Ref Range    Glucose 98 65 - 99 mg/dL    BUN 20 8 - 27 mg/dL    Creatinine 1.14 (H) 0.57 - 1.00 mg/dL    GFR est non-AA 46 (L) >59 mL/min/1.73    GFR est AA 53 (L) >59 mL/min/1.73    BUN/Creatinine ratio 18 12 - 28    Sodium 141 134 - 144 mmol/L    Potassium 3.5 3.5 - 5.2 mmol/L    Chloride 98 96 - 106 mmol/L    CO2 28 20 - 29 mmol/L    Calcium 9.6 8.7 - 10.3 mg/dL    Protein, total 6.9 6.0 - 8.5 g/dL    Albumin 4.4 3.7 - 4.7 g/dL    GLOBULIN, TOTAL 2.5 1.5 - 4.5 g/dL    A-G Ratio 1.8 1.2 - 2.2    Bilirubin, total 0.2 0.0 - 1.2 mg/dL    Alk. phosphatase 69 48 - 121 IU/L    AST (SGOT) 18 0 - 40 IU/L    ALT (SGPT) 12 0 - 32 IU/L   LIPID PANEL   Result Value Ref Range    Cholesterol, total 280 (H) 100 - 199 mg/dL    Triglyceride 103 0 - 149 mg/dL    HDL Cholesterol 63 >39 mg/dL    VLDL, calculated 18 5 - 40 mg/dL    LDL, calculated 199 (H) 0 - 99 mg/dL   TSH 3RD GENERATION   Result Value Ref Range    TSH 1.710 0.450 - 4.500 uIU/mL       Assessment/Plan:      ICD-10-CM ICD-9-CM    1. Essential hypertension  I10 401.9 CBC WITH AUTOMATED DIFF      METABOLIC PANEL, COMPREHENSIVE   2. Pure hypercholesterolemia  E78.00 272.0 LIPID PANEL   3. Situational depression  F43.21 309.0    4. Osteopenia of lumbar spine  M85.88 733.90 VITAMIN D, 25 HYDROXY   5. Situational anxiety  F41.8 300.09    6. History of panic attacks  Z86.59 V11.8      Orders Placed This Encounter    CBC WITH AUTOMATED DIFF    METABOLIC PANEL, COMPREHENSIVE    LIPID PANEL    VITAMIN D, 25 HYDROXY       Hypertension well controlled continued on losartan hydrochlorothiazide 50/12.5 mg once a day, metoprolol ER 50 mg/day, diet low in sodium. Amlodipine 10 mg/day. Medicines reconciliation done. Hypercholesteremia again she is not taking atorvastatin explained to her  in month of May also she had uncontrolled lipid profile, so labs ordered to decide the dose of atorvastatin with a 10 mg or 20 mg I had long discussion with . Pamela Pavon and he agreed diet low in fat.     History of mild situational depression with stress and anxiety she feels sleepy so I told her not to take hydroxyzine and she does not have any panic attack and she has a very good joyful mood according to her  she is doing very good so continued on sertraline 50 mg/day. History of hypokalemia continued on potassium supplementation and labs ordered. Osteopenia in the hip joint continued on alendronate 35 mg once a week and vitamin D level ordered and continue calcium 600 mg/day and weightbearing exercise. Labs ordered. Follow-up in 6 months. Age-appropriate preventive screening vaccinations advised answered all the questions before the left. follow low fat diet, continue present plan, routine labs ordered, call if any problems    There are no Patient Instructions on file for this visit. Follow-up and Dispositions    · Return in about 6 months (around 6/1/2022) for follow up for chronic condition labs today.

## 2021-12-02 LAB
25(OH)D3+25(OH)D2 SERPL-MCNC: 64.5 NG/ML (ref 30–100)
ALBUMIN SERPL-MCNC: 4.5 G/DL (ref 3.7–4.7)
ALBUMIN/GLOB SERPL: 1.8 {RATIO} (ref 1.2–2.2)
ALP SERPL-CCNC: 69 IU/L (ref 44–121)
ALT SERPL-CCNC: 15 IU/L (ref 0–32)
AST SERPL-CCNC: 19 IU/L (ref 0–40)
BASOPHILS # BLD AUTO: 0.1 X10E3/UL (ref 0–0.2)
BASOPHILS NFR BLD AUTO: 1 %
BILIRUB SERPL-MCNC: 0.3 MG/DL (ref 0–1.2)
BUN SERPL-MCNC: 19 MG/DL (ref 8–27)
BUN/CREAT SERPL: 15 (ref 12–28)
CALCIUM SERPL-MCNC: 9.8 MG/DL (ref 8.7–10.3)
CHLORIDE SERPL-SCNC: 97 MMOL/L (ref 96–106)
CHOLEST SERPL-MCNC: 181 MG/DL (ref 100–199)
CO2 SERPL-SCNC: 31 MMOL/L (ref 20–29)
CREAT SERPL-MCNC: 1.31 MG/DL (ref 0.57–1)
EOSINOPHIL # BLD AUTO: 0.1 X10E3/UL (ref 0–0.4)
EOSINOPHIL NFR BLD AUTO: 2 %
ERYTHROCYTE [DISTWIDTH] IN BLOOD BY AUTOMATED COUNT: 14.4 % (ref 11.7–15.4)
GLOBULIN SER CALC-MCNC: 2.5 G/DL (ref 1.5–4.5)
GLUCOSE SERPL-MCNC: 93 MG/DL (ref 65–99)
HCT VFR BLD AUTO: 37.3 % (ref 34–46.6)
HDLC SERPL-MCNC: 60 MG/DL
HGB BLD-MCNC: 12.2 G/DL (ref 11.1–15.9)
IMM GRANULOCYTES # BLD AUTO: 0 X10E3/UL (ref 0–0.1)
IMM GRANULOCYTES NFR BLD AUTO: 0 %
LDLC SERPL CALC-MCNC: 104 MG/DL (ref 0–99)
LYMPHOCYTES # BLD AUTO: 2.2 X10E3/UL (ref 0.7–3.1)
LYMPHOCYTES NFR BLD AUTO: 27 %
MCH RBC QN AUTO: 26 PG (ref 26.6–33)
MCHC RBC AUTO-ENTMCNC: 32.7 G/DL (ref 31.5–35.7)
MCV RBC AUTO: 80 FL (ref 79–97)
MONOCYTES # BLD AUTO: 0.5 X10E3/UL (ref 0.1–0.9)
MONOCYTES NFR BLD AUTO: 6 %
NEUTROPHILS # BLD AUTO: 5.4 X10E3/UL (ref 1.4–7)
NEUTROPHILS NFR BLD AUTO: 64 %
PLATELET # BLD AUTO: 316 X10E3/UL (ref 150–450)
POTASSIUM SERPL-SCNC: 3.7 MMOL/L (ref 3.5–5.2)
PROT SERPL-MCNC: 7 G/DL (ref 6–8.5)
RBC # BLD AUTO: 4.69 X10E6/UL (ref 3.77–5.28)
SODIUM SERPL-SCNC: 141 MMOL/L (ref 134–144)
TRIGL SERPL-MCNC: 91 MG/DL (ref 0–149)
VLDLC SERPL CALC-MCNC: 17 MG/DL (ref 5–40)
WBC # BLD AUTO: 8.2 X10E3/UL (ref 3.4–10.8)

## 2021-12-02 NOTE — PROGRESS NOTES
Please  call Ms. mary or her  because sometimes patient remains little anxious that her labs are good including normal blood count and cholesterol readings are normal so she can continue atorvastatin 10 mg at bedtime, alsoHer vitamin D level is very very good. liver enzymes are good and potassium is 3.7 so continue same dose of potassium pill, that she is taking. Avoid dehydration to prevent dryness of the kidney her kidney function is 45% but not to worry do not take too much over-the-counter ibuprofen Aleve like prescription she can take Tylenol for aches and pain mindfully.

## 2022-02-20 RX ORDER — POTASSIUM CHLORIDE 750 MG/1
TABLET, EXTENDED RELEASE ORAL
Qty: 90 TABLET | Refills: 1 | Status: SHIPPED | OUTPATIENT
Start: 2022-02-20 | End: 2022-08-18

## 2022-03-18 PROBLEM — F41.8 SITUATIONAL ANXIETY: Status: ACTIVE | Noted: 2020-09-22

## 2022-03-18 PROBLEM — E87.6 HYPOKALEMIA: Status: ACTIVE | Noted: 2021-05-28

## 2022-03-19 PROBLEM — E55.9 VITAMIN D DEFICIENCY: Status: ACTIVE | Noted: 2021-01-22

## 2022-03-19 PROBLEM — R63.4 WEIGHT LOSS: Status: ACTIVE | Noted: 2020-09-22

## 2022-03-19 PROBLEM — E78.00 PURE HYPERCHOLESTEROLEMIA: Status: ACTIVE | Noted: 2020-09-21

## 2022-03-19 PROBLEM — M85.80 OSTEOPENIA: Status: ACTIVE | Noted: 2020-09-21

## 2022-03-19 PROBLEM — I10 ESSENTIAL HYPERTENSION: Status: ACTIVE | Noted: 2020-09-21

## 2022-03-19 PROBLEM — R53.83 FATIGUE: Status: ACTIVE | Noted: 2020-09-21

## 2022-03-19 PROBLEM — Z86.59 HISTORY OF PANIC ATTACKS: Status: ACTIVE | Noted: 2021-01-22

## 2022-03-19 PROBLEM — R68.89 FORGETFULNESS: Status: ACTIVE | Noted: 2020-12-22

## 2022-03-19 PROBLEM — R41.3 MEMORY IMPAIRMENT: Status: ACTIVE | Noted: 2021-08-31

## 2022-03-19 RX ORDER — ALENDRONATE SODIUM 35 MG/1
TABLET ORAL
Qty: 12 TABLET | Refills: 1 | Status: SHIPPED | OUTPATIENT
Start: 2022-03-19 | End: 2022-06-16

## 2022-03-20 PROBLEM — F43.21 SITUATIONAL DEPRESSION: Status: ACTIVE | Noted: 2020-09-22

## 2022-03-30 ENCOUNTER — TELEPHONE (OUTPATIENT)
Dept: INTERNAL MEDICINE CLINIC | Age: 82
End: 2022-03-30

## 2022-03-30 NOTE — TELEPHONE ENCOUNTER
Called pt [facility] Dr. Alex Martinez is booking into the middle of July and you have an appointment 6/1/22. We have several [place] offices that may be able to see you prior to our availability, please call us back.

## 2022-03-30 NOTE — TELEPHONE ENCOUNTER
----- Message from Chang Feliciano sent at 3/30/2022  3:15 PM EDT -----  Subject: Appointment Request    Reason for Call: Routine (Patient Request) No Script    QUESTIONS  Type of Appointment? Established Patient  Reason for appointment request? No appointments available during search  Additional Information for Provider? Pt needs appt w/Dr Tania Horn for   intermittent bilateral leg pain from waist to heel for about 3wks; no   appts available; screened green  ---------------------------------------------------------------------------  --------------  CALL BACK INFO  What is the best way for the office to contact you? OK to leave message on   voicemail  Preferred Call Back Phone Number? [phone]  ---------------------------------------------------------------------------  --------------  SCRIPT ANSWERS  Relationship to Patient? Other  Representative Name? Robert Ofe  Additional information verified (besides Name and Date of Birth)? Address  (Is the patient requesting to see the provider for a procedure?)? No  (Is the patient requesting to see the provider urgently  today or   tomorrow. )? No  Have you been diagnosed with, awaiting test results for, or told that you   are suspected of having COVID-19 (Coronavirus)? (If patient has tested   negative or was tested as a requirement for work, school, or travel and   not based on symptoms, answer no)? No  Within the past 10 days have you developed any of the following symptoms   (answer no if symptoms have been present longer than 10 days or began   more than 10 days ago)? Fever or Chills, Cough, Shortness of breath or   difficulty breathing, Loss of taste or smell, Sore throat, Nasal   congestion, Sneezing or runny nose, Fatigue or generalized body aches   (answer no if pain is specific to a body part e.g. back pain), Diarrhea,   Headache? No  Have you had close contact with someone with COVID-19 in the last 7 days?    No  (Service Expert  click yes below to proceed with Jonah Frias As Usual   Scheduling)?  Yes

## 2022-04-07 ENCOUNTER — TELEPHONE (OUTPATIENT)
Dept: INTERNAL MEDICINE CLINIC | Age: 82
End: 2022-04-07

## 2022-04-07 NOTE — TELEPHONE ENCOUNTER
Pt called and stated that she needs a appointment because she has bilateral leg pain. I scheduled the patient to see Dr. Kevin Bae.  Pt made aware that if she has severe pain to go to the ER right away and if she does not have transportation to call [phone]

## 2022-04-19 ENCOUNTER — TELEPHONE (OUTPATIENT)
Dept: INTERNAL MEDICINE CLINIC | Age: 82
End: 2022-04-19

## 2022-04-19 RX ORDER — HYDROXYZINE HYDROCHLORIDE 10 MG/1
10 TABLET, FILM COATED ORAL
Qty: 60 TABLET | Refills: 1 | Status: SHIPPED | OUTPATIENT
Start: 2022-04-19 | End: 2022-06-16 | Stop reason: ALTCHOICE

## 2022-04-19 NOTE — TELEPHONE ENCOUNTER
[facility] faxed refill request for the following medication:      Hydroxyzine HCL 10 MG Tablet  QTY: 180  Take 1 tablet by mouth two (2) times daily as needed for anxiety          LOV 12-1-2021  NOV 6-1-2022

## 2022-04-20 ENCOUNTER — OFFICE VISIT (OUTPATIENT)
Dept: INTERNAL MEDICINE CLINIC | Age: 82
End: 2022-04-20
Payer: MEDICARE

## 2022-04-20 VITALS
HEART RATE: 54 BPM | HEIGHT: 65 IN | SYSTOLIC BLOOD PRESSURE: 144 MMHG | BODY MASS INDEX: 22.82 KG/M2 | WEIGHT: 137 LBS | OXYGEN SATURATION: 95 % | DIASTOLIC BLOOD PRESSURE: 80 MMHG | RESPIRATION RATE: 12 BRPM

## 2022-04-20 DIAGNOSIS — M79.604 BILATERAL LEG PAIN: Primary | ICD-10-CM

## 2022-04-20 DIAGNOSIS — M79.605 BILATERAL LEG PAIN: Primary | ICD-10-CM

## 2022-04-20 PROBLEM — R63.4 WEIGHT LOSS: Status: RESOLVED | Noted: 2020-09-22 | Resolved: 2022-04-20

## 2022-04-20 PROBLEM — R53.83 FATIGUE: Status: RESOLVED | Noted: 2020-09-21 | Resolved: 2022-04-20

## 2022-04-20 PROBLEM — E87.6 HYPOKALEMIA: Status: RESOLVED | Noted: 2021-05-28 | Resolved: 2022-04-20

## 2022-04-20 PROCEDURE — 99213 OFFICE O/P EST LOW 20 MIN: CPT | Performed by: FAMILY MEDICINE

## 2022-04-20 RX ORDER — DICLOFENAC SODIUM 10 MG/G
4 GEL TOPICAL
Qty: 100 G | Refills: 1 | Status: SHIPPED | OUTPATIENT
Start: 2022-04-20 | End: 2022-06-13

## 2022-04-20 NOTE — PROGRESS NOTES
Mari Sweeney (: 1940) is a 80 y.o. female, established patient, here for evaluation of the following chief complaint(s):  Leg Pain (bilateral present 1month )       ASSESSMENT/PLAN:  Below is the assessment and plan developed based on review of pertinent history, physical exam, labs, studies, and medications. 1. Bilateral leg pain  Acute  -     diclofenac (VOLTAREN) 1 % gel; Apply 4 g to affected area four (4) times daily as needed for Pain., Normal, Disp-100 g, R-1  -     REFERRAL TO PHYSICAL THERAPY  Suspect related to underlying osteoarthritis condition of hips/knee, do not suspect PAD pain, Voltaren gel prescribed as needed for OA, referral to physical therapy for muscle strengthening. Return if symptoms worsen or fail to improve. SUBJECTIVE/OBJECTIVE:  HPI    29-year-old female history of osteopenia presents for bilateral leg pain x1 month. Patient endorses bilateral leg pain from thigh down to knees bilaterally that started 1 month ago. Patient denies trauma or previous injury to bilateral lower extremities. Patient denies lower back pain, endorses thighs feel stretched and describes her pain as aching, pain occurs at rest and with activity. Patient states it is intermittent, denies focal weakness. Patient states she is walking without difficulty. Patient states that she does not smoke. Patient denies paresthesias. Patient states pain starts proximally in the hips and travels to the knees laterally and posteriorly. Patient denies history of such pain occurring before. Allergies   Allergen Reactions    Metronidazole Itching     Current Outpatient Medications   Medication Sig    diclofenac (VOLTAREN) 1 % gel Apply 4 g to affected area four (4) times daily as needed for Pain.  hydrOXYzine HCL (ATARAX) 10 mg tablet Take 1 Tablet by mouth two (2) times daily as needed for Anxiety.     alendronate (FOSAMAX) 35 mg tablet TAKE 1 TABLET BY MOUTH ONCE WEEK AS DIRECTECD   Genna Gregory losartan-hydroCHLOROthiazide (HYZAAR) 50-12.5 mg per tablet TAKE 1 TABLET BY MOUTH EVERY DAY    metoprolol succinate (TOPROL-XL) 50 mg XL tablet TAKE 1 TABLET BY MOUTH EVERY DAY    amLODIPine (NORVASC) 10 mg tablet TAKE 1 TABLET BY MOUTH EVERY DAY    sertraline (ZOLOFT) 50 mg tablet TAKE 1 TABLET BY MOUTH EVERY DAY    aspirin delayed-release 81 mg tablet Take 1 Tab by mouth daily.  multivit-min/iron/folic/lutein (CENTRUM SILVER WOMEN PO) Take 1 Tab by mouth daily.  Klor-Con M10 10 mEq tablet TAKE 1 TABLET BY MOUTH EVERY DAY (Patient not taking: Reported on 2022)    atorvastatin (LIPITOR) 10 mg tablet Take 1 Tablet by mouth nightly. (Patient not taking: Reported on 2022)    atorvastatin (LIPITOR) 10 mg tablet Take 1 Tab by mouth daily. (Patient not taking: Reported on 2021)     No current facility-administered medications for this visit. Past Medical History:   Diagnosis Date    Anxiety     Asthma     Hypercholesterolemia     Hypertension     Osteoporosis     Situational depression 2020     Past Surgical History:   Procedure Laterality Date    HX COLONOSCOPY  11/10/2015    HX HYSTERECTOMY      HX THYROIDECTOMY       Family History   Problem Relation Age of Onset    Alzheimer's Disease Mother     Dementia Mother     Heart Disease Maternal Grandmother      Social History     Tobacco Use   Smoking Status Former Smoker    Packs/day: 0.25    Years: 10.00    Pack years: 2.50    Types: Cigarettes    Quit date:     Years since quittin.3   Smokeless Tobacco Former User         Review of Systems   Constitutional: Negative. HENT: Negative. Respiratory: Negative. Cardiovascular: Negative. Gastrointestinal: Negative. Musculoskeletal:        Bilateral leg pain   Neurological: Negative.           BP (!) 144/80 (BP 1 Location: Left upper arm, BP Patient Position: Sitting, BP Cuff Size: Adult)   Pulse (!) 54   Resp 12   Ht 5' 5\" (1.651 m)   Wt 137 lb (62.1 kg)   SpO2 95%   BMI 22.80 kg/m²    Physical Exam  Vitals reviewed. Constitutional:       Appearance: Normal appearance. HENT:      Head: Normocephalic. Cardiovascular:      Rate and Rhythm: Regular rhythm. Pulses: Normal pulses. Heart sounds: Normal heart sounds. Pulmonary:      Breath sounds: Normal breath sounds. Musculoskeletal:      Lumbar back: Normal.      Right hip: Normal.      Left hip: Normal.      Right upper leg: Normal.      Left upper leg: Normal.      Right knee: Tenderness present over the lateral joint line. Instability Tests: Anterior drawer test negative. Posterior drawer test negative. Medial Miracle test negative and lateral Miracle test negative. Left knee: Tenderness present over the lateral joint line. Instability Tests: Anterior drawer test negative. Posterior drawer test negative. Medial Miracle test negative and lateral Miracle test negative. Right lower leg: No edema. Left lower leg: No edema. Skin:     Capillary Refill: Capillary refill takes less than 2 seconds. Neurological:      General: No focal deficit present. Mental Status: She is alert. Sensory: No sensory deficit. Motor: No weakness. Coordination: Coordination normal.      Gait: Gait normal.      Deep Tendon Reflexes: Reflexes are normal and symmetric. Reflexes normal.               On this date 04/20/2022 I have spent 25 minutes reviewing previous notes, test results and face to face with the patient discussing the diagnosis and importance of compliance with the treatment plan as well as documenting on the day of the visit. An electronic signature was used to authenticate this note.   -- MD Halima Panchal [id number]

## 2022-04-20 NOTE — PROGRESS NOTES
Chief Complaint   Patient presents with    Leg Pain     bilateral present 1month      Visit Vitals  BP (!) 144/80 (BP 1 Location: Left upper arm, BP Patient Position: Sitting, BP Cuff Size: Adult)   Pulse (!) 54   Resp 12   Ht 5' 5\" (1.651 m)   Wt 137 lb (62.1 kg)   SpO2 95%   BMI 22.80 kg/m²       1. \"Have you been to the ER, urgent care clinic since your last visit? Hospitalized since your last visit? \" No    2. \"Have you seen or consulted any other health care providers outside of the [address] [place] [address] since your last visit? \" No     3. For patients aged 39-70: Has the patient had a colonoscopy / FIT/ Cologuard? NA - based on age      If the patient is female:    4. For patients aged 41-77: Has the patient had a mammogram within the past 2 years? NA - based on age or sex      11. For patients aged 21-65: Has the patient had a pap smear?  NA - based on age or sex

## 2022-05-17 RX ORDER — AMLODIPINE BESYLATE 10 MG/1
TABLET ORAL
Qty: 90 TABLET | Refills: 1 | Status: SHIPPED | OUTPATIENT
Start: 2022-05-17 | End: 2022-09-07

## 2022-05-17 RX ORDER — SERTRALINE HYDROCHLORIDE 50 MG/1
TABLET, FILM COATED ORAL
Qty: 90 TABLET | Refills: 1 | Status: SHIPPED | OUTPATIENT
Start: 2022-05-17 | End: 2022-09-07

## 2022-05-17 RX ORDER — METOPROLOL SUCCINATE 50 MG/1
TABLET, EXTENDED RELEASE ORAL
Qty: 90 TABLET | Refills: 1 | Status: SHIPPED | OUTPATIENT
Start: 2022-05-17 | End: 2022-09-18

## 2022-05-17 RX ORDER — ATORVASTATIN CALCIUM 10 MG/1
TABLET, FILM COATED ORAL
Qty: 90 TABLET | Refills: 1 | Status: SHIPPED | OUTPATIENT
Start: 2022-05-17 | End: 2022-09-07

## 2022-05-17 RX ORDER — LOSARTAN POTASSIUM AND HYDROCHLOROTHIAZIDE 12.5; 5 MG/1; MG/1
TABLET ORAL
Qty: 90 TABLET | Refills: 1 | Status: SHIPPED | OUTPATIENT
Start: 2022-05-17 | End: 2022-09-07

## 2022-05-29 PROBLEM — Z11.59 NEED FOR HEPATITIS C SCREENING TEST: Status: ACTIVE | Noted: 2022-05-29

## 2022-05-29 PROBLEM — N18.30 CHRONIC RENAL DISEASE, STAGE III (HCC): Status: ACTIVE | Noted: 2022-05-29

## 2022-06-11 DIAGNOSIS — M79.604 BILATERAL LEG PAIN: ICD-10-CM

## 2022-06-11 DIAGNOSIS — M79.605 BILATERAL LEG PAIN: ICD-10-CM

## 2022-06-13 RX ORDER — DICLOFENAC SODIUM 10 MG/G
4 GEL TOPICAL
Qty: 100 G | Refills: 1 | Status: SHIPPED | OUTPATIENT
Start: 2022-06-13 | End: 2022-08-15

## 2022-06-16 ENCOUNTER — OFFICE VISIT (OUTPATIENT)
Dept: INTERNAL MEDICINE CLINIC | Age: 82
End: 2022-06-16
Payer: MEDICARE

## 2022-06-16 VITALS
BODY MASS INDEX: 22.39 KG/M2 | DIASTOLIC BLOOD PRESSURE: 74 MMHG | HEIGHT: 65 IN | HEART RATE: 60 BPM | RESPIRATION RATE: 20 BRPM | SYSTOLIC BLOOD PRESSURE: 138 MMHG | WEIGHT: 134.4 LBS | OXYGEN SATURATION: 99 % | TEMPERATURE: 98 F

## 2022-06-16 DIAGNOSIS — N18.31 STAGE 3A CHRONIC KIDNEY DISEASE (HCC): ICD-10-CM

## 2022-06-16 DIAGNOSIS — I10 ESSENTIAL HYPERTENSION: ICD-10-CM

## 2022-06-16 DIAGNOSIS — M54.16 LUMBAR RADICULOPATHY: ICD-10-CM

## 2022-06-16 DIAGNOSIS — M79.605 BILATERAL LEG PAIN: ICD-10-CM

## 2022-06-16 DIAGNOSIS — Z11.59 NEED FOR HEPATITIS C SCREENING TEST: ICD-10-CM

## 2022-06-16 DIAGNOSIS — M79.604 BILATERAL LEG PAIN: ICD-10-CM

## 2022-06-16 DIAGNOSIS — M85.88 OSTEOPENIA OF LUMBAR SPINE: ICD-10-CM

## 2022-06-16 DIAGNOSIS — F43.21 SITUATIONAL DEPRESSION: ICD-10-CM

## 2022-06-16 DIAGNOSIS — M79.10 MUSCLE PAIN: ICD-10-CM

## 2022-06-16 DIAGNOSIS — E78.00 PURE HYPERCHOLESTEROLEMIA: ICD-10-CM

## 2022-06-16 DIAGNOSIS — Z86.59 HISTORY OF PANIC ATTACKS: ICD-10-CM

## 2022-06-16 DIAGNOSIS — F41.8 SITUATIONAL ANXIETY: ICD-10-CM

## 2022-06-16 PROCEDURE — G8752 SYS BP LESS 140: HCPCS | Performed by: INTERNAL MEDICINE

## 2022-06-16 PROCEDURE — G8420 CALC BMI NORM PARAMETERS: HCPCS | Performed by: INTERNAL MEDICINE

## 2022-06-16 PROCEDURE — G8536 NO DOC ELDER MAL SCRN: HCPCS | Performed by: INTERNAL MEDICINE

## 2022-06-16 PROCEDURE — G9717 DOC PT DX DEP/BP F/U NT REQ: HCPCS | Performed by: INTERNAL MEDICINE

## 2022-06-16 PROCEDURE — G8754 DIAS BP LESS 90: HCPCS | Performed by: INTERNAL MEDICINE

## 2022-06-16 PROCEDURE — G8427 DOCREV CUR MEDS BY ELIG CLIN: HCPCS | Performed by: INTERNAL MEDICINE

## 2022-06-16 PROCEDURE — 1101F PT FALLS ASSESS-DOCD LE1/YR: CPT | Performed by: INTERNAL MEDICINE

## 2022-06-16 PROCEDURE — 99214 OFFICE O/P EST MOD 30 MIN: CPT | Performed by: INTERNAL MEDICINE

## 2022-06-16 PROCEDURE — 1090F PRES/ABSN URINE INCON ASSESS: CPT | Performed by: INTERNAL MEDICINE

## 2022-06-16 PROCEDURE — G8399 PT W/DXA RESULTS DOCUMENT: HCPCS | Performed by: INTERNAL MEDICINE

## 2022-06-16 RX ORDER — POLYETHYLENE GLYCOL 3350 17 G/17G
17 POWDER, FOR SOLUTION ORAL DAILY
Qty: 595 G | Refills: 1 | Status: SHIPPED | OUTPATIENT
Start: 2022-06-16

## 2022-06-16 RX ORDER — ACETAMINOPHEN 500 MG
500 TABLET ORAL
Qty: 60 TABLET | Refills: 2 | Status: SHIPPED | OUTPATIENT
Start: 2022-06-16

## 2022-06-16 RX ORDER — DONEPEZIL HYDROCHLORIDE 10 MG/1
10 TABLET, FILM COATED ORAL
COMMUNITY

## 2022-06-16 NOTE — PROGRESS NOTES
1. \"Have you been to the ER, urgent care clinic since your last visit? Hospitalized since your last visit? \" No    2. \"Have you seen or consulted any other health care providers outside of the [address] [place] [address] since your last visit? \" No     3. For patients aged 39-70: Has the patient had a colonoscopy / FIT/ Cologuard? NA - based on age      If the patient is female:    4. For patients aged 41-77: Has the patient had a mammogram within the past 2 years? NA - based on age or sex      11. For patients aged 21-65: Has the patient had a pap smear?  NA - based on age or sex     Visit Vitals  BP (!) 140/84 (BP 1 Location: Left arm)   Pulse 61   Temp 98 °F (36.7 °C) (Temporal)   Resp 20   Ht 5' 5\" (1.651 m)   Wt 134 lb 6.4 oz (61 kg)   SpO2 99%   BMI 22.37 kg/m²       Chief Complaint   Patient presents with    Follow Up Chronic Condition    Hypertension

## 2022-06-16 NOTE — PROGRESS NOTES
Isaak Talamantes is a 80 y.o. female and presents with Follow Up Chronic Condition and Hypertension    Ms. Mouna Walls came with her  for regular follow-up. In the interval time she had consulted Dr. Ynes Wallace for bilateral leg pain and according to her she has localized pain around the right upper thigh and she was recommended to have physical therapy that I also ordered and patient and Mr. Bony Sahu wants to do physical therapy. She had bone density showing osteopenia recommended to hold and not to take alendronate having pain since March. She has remarkable improvement in her lipid profile after being on atorvastatin 10 mg taking consistently and I will order CK level. She forgot to do labs that I had ordered. She is able to bend and have a normal range of movement. No complaints of back pain hip pain or knee pain. She remains sometimes anxious and she has consulted neurologist Dr. Myles Ozuna who has started her on donepezil 10 mg/day and she is overall compliant for her medicines. No negative thoughts no depression she has very good support from her . She is going to get Tdap and Shingrix vaccine from the pharmacy. Her up today blood pressure is controlled with current medication regimen medicine reconciliation done. She wants something to help her constipation. No blood in stool. Review of Systems    Review of Systems   Constitutional: Negative. HENT: Negative for sinus pain and sore throat. Eyes: Negative for blurred vision. Respiratory: Negative for cough, shortness of breath and wheezing. Cardiovascular: Negative. Gastrointestinal: Negative. Genitourinary: Negative. Musculoskeletal: Negative for back pain, falls, joint pain and myalgias. Neurological: Negative for dizziness and tingling. Psychiatric/Behavioral: Negative.          Past Medical History:   Diagnosis Date    Anxiety     Asthma     Hypercholesterolemia     Hypertension     Osteoporosis  Situational depression 2020     Past Surgical History:   Procedure Laterality Date    HX COLONOSCOPY  11/10/2015    HX HYSTERECTOMY      HX THYROIDECTOMY       Social History     Socioeconomic History    Marital status:    Tobacco Use    Smoking status: Former Smoker     Packs/day: 0.25     Years: 10.00     Pack years: 2.50     Types: Cigarettes     Quit date:      Years since quittin.4    Smokeless tobacco: Former User   Substance and Sexual Activity    Alcohol use: Yes     Comment: occ     Family History   Problem Relation Age of Onset    Alzheimer's Disease Mother     Dementia Mother     Heart Disease Maternal Grandmother      Current Outpatient Medications   Medication Sig Dispense Refill    donepeziL (ARICEPT) 10 mg tablet Take 10 mg by mouth nightly.  cholecalciferol, vitamin D3, (VITAMIN D3 PO) Take  by mouth.  acetaminophen (TYLENOL) 500 mg tablet Take 1 Tablet by mouth two (2) times daily as needed for Pain. 60 Tablet 2    polyethylene glycol (MIRALAX) 17 gram/dose powder Take 17 g by mouth daily. 595 g 1    diclofenac (VOLTAREN) 1 % gel APPLY 4 G TO AFFECTED AREA FOUR (4) TIMES DAILY AS NEEDED FOR PAIN. 100 g 1    sertraline (ZOLOFT) 50 mg tablet TAKE 1 TABLET BY MOUTH EVERY DAY 90 Tablet 1    amLODIPine (NORVASC) 10 mg tablet TAKE 1 TABLET BY MOUTH EVERY DAY 90 Tablet 1    atorvastatin (LIPITOR) 10 mg tablet TAKE 1 TABLET BY MOUTH EVERY DAY AT NIGHT 90 Tablet 1    metoprolol succinate (TOPROL-XL) 50 mg XL tablet TAKE 1 TABLET BY MOUTH EVERY DAY 90 Tablet 1    losartan-hydroCHLOROthiazide (HYZAAR) 50-12.5 mg per tablet TAKE 1 TABLET BY MOUTH EVERY DAY 90 Tablet 1    Klor-Con M10 10 mEq tablet TAKE 1 TABLET BY MOUTH EVERY DAY 90 Tablet 1    atorvastatin (LIPITOR) 10 mg tablet Take 1 Tab by mouth daily. 90 Tab 1    aspirin delayed-release 81 mg tablet Take 1 Tab by mouth daily.       multivit-min/iron/folic/lutein (CENTRUM SILVER WOMEN PO) Take 1 Tab by mouth daily. Allergies   Allergen Reactions    Metronidazole Itching       Objective:  Visit Vitals  /74 (BP 1 Location: Left upper arm, BP Cuff Size: Adult)   Pulse 60   Temp 98 °F (36.7 °C) (Temporal)   Resp 20   Ht 5' 5\" (1.651 m)   Wt 134 lb 6.4 oz (61 kg)   SpO2 99%   BMI 22.37 kg/m²       Physical Exam:   Constitutional: General Appearance: . Pleasant level of Distress: NAD. Psychiatric: Mental Status: normal mood and affect Orientation: to time, place, and person. ,normal eye contact. Head: Head: normocephalic and atraumatic. Eyes: Pupils: PERRLA. Sclerae: non-icteric. Neck: Neck: supple, trachea midline, and no masses. Lymph Nodes: no cervical LAD. Thyroid: no enlargement or nodules and non-tender. Lungs: Respiratory effort: no dyspnea. Auscultation: no wheezing, rales/crackles, or rhonchi and breath sounds normal and good air movement. Cardiovascular: Apical Impulse: not displaced. Heart Auscultation: normal S1 and S2; no murmurs, rubs, or gallops; and RRR. Neck vessels: no carotid bruits. Pulses including femoral / pedal: normal throughout. Abdomen: Bowel Sounds: normal. Inspection and Palpation: no tenderness, guarding, or masses and soft and non-distended. Liver: non-tender and no hepatomegaly. Spleen: non-tender and no splenomegaly. Musculoskeletal[de-identified] Extremities: no edema,no varicosities. No Calf tenderness. Neurologic: Gait and Station: normal gait and station. Motor Strength normal right and left. Skin: Inspection and palpation: no rash, lesions, or ulcer.        Results for orders placed or performed in visit on 12/01/21   CBC WITH AUTOMATED DIFF   Result Value Ref Range    WBC 8.2 3.4 - 10.8 x10E3/uL    RBC 4.69 3.77 - 5.28 x10E6/uL    HGB 12.2 11.1 - 15.9 g/dL    HCT 37.3 34.0 - 46.6 %    MCV 80 79 - 97 fL    MCH 26.0 (L) 26.6 - 33.0 pg    MCHC 32.7 31.5 - 35.7 g/dL    RDW 14.4 11.7 - 15.4 %    PLATELET 935 826 - 648 x10E3/uL    NEUTROPHILS 64 Not Estab. % Lymphocytes 27 Not Estab. %    MONOCYTES 6 Not Estab. %    EOSINOPHILS 2 Not Estab. %    BASOPHILS 1 Not Estab. %    ABS. NEUTROPHILS 5.4 1.4 - 7.0 x10E3/uL    Abs Lymphocytes 2.2 0.7 - 3.1 x10E3/uL    ABS. MONOCYTES 0.5 0.1 - 0.9 x10E3/uL    ABS. EOSINOPHILS 0.1 0.0 - 0.4 x10E3/uL    ABS. BASOPHILS 0.1 0.0 - 0.2 x10E3/uL    IMMATURE GRANULOCYTES 0 Not Estab. %    ABS. IMM. GRANS. 0.0 0.0 - 0.1 X29X3/BG   METABOLIC PANEL, COMPREHENSIVE   Result Value Ref Range    Glucose 93 65 - 99 mg/dL    BUN 19 8 - 27 mg/dL    Creatinine 1.31 (H) 0.57 - 1.00 mg/dL    GFR est non-AA 39 (L) >59 mL/min/1.73    GFR est AA 45 (L) >59 mL/min/1.73    BUN/Creatinine ratio 15 12 - 28    Sodium 141 134 - 144 mmol/L    Potassium 3.7 3.5 - 5.2 mmol/L    Chloride 97 96 - 106 mmol/L    CO2 31 (H) 20 - 29 mmol/L    Calcium 9.8 8.7 - 10.3 mg/dL    Protein, total 7.0 6.0 - 8.5 g/dL    Albumin 4.5 3.7 - 4.7 g/dL    GLOBULIN, TOTAL 2.5 1.5 - 4.5 g/dL    A-G Ratio 1.8 1.2 - 2.2    Bilirubin, total 0.3 0.0 - 1.2 mg/dL    Alk. phosphatase 69 44 - 121 IU/L    AST (SGOT) 19 0 - 40 IU/L    ALT (SGPT) 15 0 - 32 IU/L   LIPID PANEL   Result Value Ref Range    Cholesterol, total 181 100 - 199 mg/dL    Triglyceride 91 0 - 149 mg/dL    HDL Cholesterol 60 >39 mg/dL    VLDL, calculated 17 5 - 40 mg/dL    LDL, calculated 104 (H) 0 - 99 mg/dL   VITAMIN D, 25 HYDROXY   Result Value Ref Range    VITAMIN D, 25-HYDROXY 64.5 30.0 - 100.0 ng/mL       Assessment/Plan:      ICD-10-CM ICD-9-CM    1. Essential hypertension  I10 401.9 CBC WITH AUTOMATED DIFF      METABOLIC PANEL, COMPREHENSIVE   2. Pure hypercholesterolemia  E78.00 272.0 LIPID PANEL   3. Stage 3a chronic kidney disease (HCC)  N18.31 585.3    4. Situational anxiety  F41.8 300.09    5. Situational depression  F43.21 309.0    6. Lumbar radiculopathy  M54.16 724.4 REFERRAL TO PHYSICAL THERAPY   7. Osteopenia of lumbar spine  M85.88 733.90    8. History of panic attacks  Z86.59 V11.8    9.  Muscle pain  M79.10 729.1 CK   10. Need for hepatitis C screening test  Z11.59 V73.89 HEPATITIS C AB   11. Bilateral leg pain  M79.604 729.5 REFERRAL TO PHYSICAL THERAPY    M79.605       Orders Placed This Encounter    CBC WITH AUTOMATED DIFF    METABOLIC PANEL, COMPREHENSIVE    LIPID PANEL    CK    HEPATITIS C AB    REFERRAL TO PHYSICAL THERAPY     Referral Priority:   Routine     Referral Type:   PT/OT/ST     Referral Reason:   Specialty Services Required     Requested Specialty:   Physical Therapy     Number of Visits Requested:   1    donepeziL (ARICEPT) 10 mg tablet     Sig: Take 10 mg by mouth nightly.  cholecalciferol, vitamin D3, (VITAMIN D3 PO)     Sig: Take  by mouth.  acetaminophen (TYLENOL) 500 mg tablet     Sig: Take 1 Tablet by mouth two (2) times daily as needed for Pain. Dispense:  60 Tablet     Refill:  2    polyethylene glycol (MIRALAX) 17 gram/dose powder     Sig: Take 17 g by mouth daily. Dispense:  595 g     Refill:  1         Hypertension getting controlled after resting. Continued amlodipine 10 mg once a day, losartan HCTZ 50/12.5 mg once a day and morning metoprolol ER 50 mg once a day. Diet low in sodium. Labs ordered. Hypercholesteremia getting controlled being on atorvastatin 10 mg at bedtime she has localized muscle pain in the right side of the thigh I am not sure whether it is lumbar radiculopathy or myalgia I ordered total CK level. She does not have generalized pain but I told her to hold alendronate. Memory problems not worsening getting Aricept 10 mg at bedtime by her neurologist Dr. Melodie De Jesus. Anxious personality with situational depression not getting worse continued on sertraline 50 mg once a day. She is not taking hydroxyzine which may cause her tiredness. No history of panic attack. At her age of 80 I will avoid drug interaction from polypharmacy. History of hypokalemia taking Klor-Con 10 mg once a day.     Constipation diet rich in vegetables nonsugar fruits fibers and drink water. Started on MiraLAX      Bilateral leg pain she had visited Dr. Ifeoma Brar in between who recommended to have physical therapy to get stronger so I ordered physical therapy twice a week for 6 weeks that patient and her  are interested. Also recommended to take Tylenol 500 mg once or twice a day as needed as she has never taken Tylenol to control pain and also I ordered his total CK level to make sure she has no side effects from atorvastatin which is helping to control her lipid profile. No tingling numbness no neurological weakness having normal range of movement at the back and hip and knee joint. Continued on vitamin D3 and calcium supplementation. Follow-up in 3 months, labs reordered that she forgot to do. To continue regular follow-up with neurologist.    If any concerns do not hesitate to call me.      bring BP log to office visit, follow low fat diet, follow low salt diet, continue present plan, routine labs ordered, call if any problems    There are no Patient Instructions on file for this visit. Follow-up and Dispositions    · Return in about 3 months (around 9/16/2022) for follow up for chronic condition.

## 2022-06-17 LAB
ALBUMIN SERPL-MCNC: 4.6 G/DL (ref 3.7–4.7)
ALBUMIN/GLOB SERPL: 1.9 {RATIO} (ref 1.2–2.2)
ALP SERPL-CCNC: 75 IU/L (ref 44–121)
ALT SERPL-CCNC: 24 IU/L (ref 0–32)
AST SERPL-CCNC: 18 IU/L (ref 0–40)
BASOPHILS # BLD AUTO: 0.1 X10E3/UL (ref 0–0.2)
BASOPHILS NFR BLD AUTO: 1 %
BILIRUB SERPL-MCNC: 0.4 MG/DL (ref 0–1.2)
BUN SERPL-MCNC: 12 MG/DL (ref 8–27)
BUN/CREAT SERPL: 11 (ref 12–28)
CALCIUM SERPL-MCNC: 9.7 MG/DL (ref 8.7–10.3)
CHLORIDE SERPL-SCNC: 101 MMOL/L (ref 96–106)
CHOLEST SERPL-MCNC: 219 MG/DL (ref 100–199)
CK SERPL-CCNC: 97 U/L (ref 32–182)
CO2 SERPL-SCNC: 28 MMOL/L (ref 20–29)
CREAT SERPL-MCNC: 1.12 MG/DL (ref 0.57–1)
EGFR: 50 ML/MIN/1.73
EOSINOPHIL # BLD AUTO: 0.1 X10E3/UL (ref 0–0.4)
EOSINOPHIL NFR BLD AUTO: 1 %
ERYTHROCYTE [DISTWIDTH] IN BLOOD BY AUTOMATED COUNT: 14.3 % (ref 11.7–15.4)
GLOBULIN SER CALC-MCNC: 2.4 G/DL (ref 1.5–4.5)
GLUCOSE SERPL-MCNC: 94 MG/DL (ref 65–99)
HCT VFR BLD AUTO: 37.2 % (ref 34–46.6)
HCV AB S/CO SERPL IA: 0.1 S/CO RATIO (ref 0–0.9)
HDLC SERPL-MCNC: 86 MG/DL
HGB BLD-MCNC: 12.1 G/DL (ref 11.1–15.9)
IMM GRANULOCYTES # BLD AUTO: 0 X10E3/UL (ref 0–0.1)
IMM GRANULOCYTES NFR BLD AUTO: 0 %
LDLC SERPL CALC-MCNC: 120 MG/DL (ref 0–99)
LYMPHOCYTES # BLD AUTO: 1.8 X10E3/UL (ref 0.7–3.1)
LYMPHOCYTES NFR BLD AUTO: 20 %
MCH RBC QN AUTO: 26.6 PG (ref 26.6–33)
MCHC RBC AUTO-ENTMCNC: 32.5 G/DL (ref 31.5–35.7)
MCV RBC AUTO: 82 FL (ref 79–97)
MONOCYTES # BLD AUTO: 0.5 X10E3/UL (ref 0.1–0.9)
MONOCYTES NFR BLD AUTO: 5 %
NEUTROPHILS # BLD AUTO: 6.5 X10E3/UL (ref 1.4–7)
NEUTROPHILS NFR BLD AUTO: 73 %
PLATELET # BLD AUTO: 254 X10E3/UL (ref 150–450)
POTASSIUM SERPL-SCNC: 3.7 MMOL/L (ref 3.5–5.2)
PROT SERPL-MCNC: 7 G/DL (ref 6–8.5)
RBC # BLD AUTO: 4.55 X10E6/UL (ref 3.77–5.28)
SODIUM SERPL-SCNC: 144 MMOL/L (ref 134–144)
TRIGL SERPL-MCNC: 76 MG/DL (ref 0–149)
VLDLC SERPL CALC-MCNC: 13 MG/DL (ref 5–40)
WBC # BLD AUTO: 9 X10E3/UL (ref 3.4–10.8)

## 2022-06-17 NOTE — PROGRESS NOTES
Please call Ms. Uribe's  that her complete blood count is normal including white cell count platelets and hemoglobin. Blood sugar kidney function potassium and liver enzymes are normal so continue the same medicineT    Cholesterol readings are slightly increased but continue atorvastatin 10 mg at bedtime. Her muscle enzyme is normal meaning atorvastatin is not harming her muscles and that is not the cause for muscle pain so continue physical therapy continue all the medicines including potassium andScreening for hepatitis C negative.

## 2022-06-28 PROBLEM — Z11.59 NEED FOR HEPATITIS C SCREENING TEST: Status: RESOLVED | Noted: 2022-05-29 | Resolved: 2022-06-28

## 2022-07-08 ENCOUNTER — HOSPITAL ENCOUNTER (OUTPATIENT)
Dept: PHYSICAL THERAPY | Age: 82
Discharge: HOME | End: 2022-07-08
Payer: MEDICARE

## 2022-07-08 PROCEDURE — 97110 THERAPEUTIC EXERCISES: CPT

## 2022-07-08 PROCEDURE — 97161 PT EVAL LOW COMPLEX 20 MIN: CPT

## 2022-07-08 RX ORDER — HYDROXYZINE HYDROCHLORIDE 10 MG/1
10 TABLET, FILM COATED ORAL
Qty: 90 TABLET | Refills: 0 | Status: SHIPPED | OUTPATIENT
Start: 2022-07-08 | End: 2022-09-18

## 2022-07-08 NOTE — PROGRESS NOTES
[address] [address] [facility] [address] Suite [facility], [address] [place] [address]  Ph: [phone]    Fax: [phone]    Initial Evaluation/Plan of Care/Statement of Necessity for Physical Therapy Services     Patient name: Farideh French    Date/Start of Care:2022  : 1940  [x]  Patient  Verified Provider#: [id number]          Referral source: Curt Tejeda MD Return visit to MD: August     Medical/Treatment Diagnosis: Other low back pain [M54.59]  Radiculopathy, lumbar region [M54.16]  Pain in right leg [M79.604]  Pain in left leg [M79.605]    Payor: [place] MEDICARE / Plan: VA MEDICARE PART A & B / Product Type: Medicare /       Prior Hospitalization: see medical history     Comorbidities: none  Medications: Verified on Patient Summary List       Patient / Family readiness to learn indicated by: asking questions and trying to perform skills  Persons(s) to be included in education: patient (P) and family support person (FSP);list spouse  Barriers to Learning/Limitations: yes;  cognitive  Patient Self Reported Health Status: excellent  Rehabilitation Potential: good  Barriers to progress: cognition  Motivation: good  Substance use: none reported  Cognition: A & O x 4   Onset Date: 10/2021    Visit #:     SUBJECTIVE  Pt is poor historian,  brought to appt but did not accompany her for evaluation. Pt reports she began having pain in the back of both legs when she walked around May or . Per intake form, began in 2021. Pain is typically only when she first gets up in the mornings and lasts for around an hour. Now the R leg is more painful but still occurs on both sides. Occasionally has pain in the evenings or intermittently during day. Describes the pain as achy. Denies numbness/tingling. She does not feel limited by the pain because it normally goes away. She has been to her PCP regarding leg pain who referred her to PT services.    Mechanism of Injury: insidious   Work Hx: not working  Living Situation: lives with  in 2 level home with railings on stairs  Previous Treatment/Compliance: takes tylenol  PMHx/Surgical Hx: none  Prior Level of Function: independent with ADLs/IADLs    PAIN  Area of pain: leg pain     Pain Level (0-10 scale): 0-4/10  Aggravating factors: mornings  Things that ease pain: walking a while    OBJECTIVE:   Physical Findings   Ortho:   Posture:  unremarkable  Palpation: mild TTP R glut med, denies TTP greater trochanter, piriformis, lumbar spine, lumbar paraspinals, ITB  Joint Mobility: no pain with PA mobs lumbar spine  Swelling: none    Spine:   TRUNK ROM:     Flexion:                         [] N/A  [x]WNL  []Minimal  []Moderate  [] Major   Extension:                     [] N/A  [x]WNL  []Minimal  []Moderate  [] Major    Right Lateral Flexion:    [] N/A  [x]WNL  []Minimal  []Moderate  [] Major    Left Lateral Flexion:   [] N/A  [x]WNL  []Minimal  []Moderate  [] Major   Rotation to the Right:  [] N/A  [x]WNL  []Minimal  []Moderate  [] Major   Rotation to the Left:   [] N/A  [x]WNL  []Minimal  []Moderate  [] Major   Right Side Glide:           [] N/A  []WNL  []Minimal  []Moderate  [] Major  Left Side Glide:   [] N/A  []WNL  []Minimal  []Moderate  [] Major  Additional comments: no pain just stretching    Specific joints: *normal values in ()    Hip      AROM       PROM             MMT   R L R L R L   Flexion (120) WNL WNL WNL WNL 4+ 5   Extension (15)     5 5   Abduction (40)     4+ 4+   Adduction (30)         IR (40)         ER (40)         Additional comments: moderate hip flexor and hamstring tightness     Knee          AROM          PROM                       MMT   R L R L R L   Extension (0)      5 4+   Flexion (145)     5 4+   Additional comments: -    Ankle                                 AROM                       PROM                             MMT   R L R L R L   Dorsiflexion (15)     4+ 5   Plantarflexion (50) Additional comments: -        Neurological: denies numbness and tingling    Special Tests: SLR (negative) ; slump (negative) ; FABERs (positive for tightness/limited mobility but no pain) ; Ely's (positive bilaterally) ; Scours (negative) ; FADDIR (Negative)    Gait and Functional Mobility:  Transfers:   Bed mobility: independent without difficulty, noted mild pain when rolling from sidelying to supine in R posterior thigh  Sit to/from Supine: independent  Sit to/from stands: independent without UEs  Gait: intermittent path deviations, RLE externally rotated with increase circumduction  TUG test: 7.5 seconds / WNL  Stairs: not tested    Five times sit to stand: 14 seconds / WNL    Normative averages:  Clients younger than 61years old  £  10 seconds = Normal   Clients older than 61years old £ 14.2 seconds = Normal   Change of ³ 2.3 seconds shows a significant clinical improvement    Nicole NAVA. Reference values for the five repetition sit to stand test: a descriptive metaanalysis of data from elders. Percept Mot Skills 2006; 103(1):215-222.     4 Stage Balance test       1. Stand with your feet [id number]. TIME:  10+ seconds  2. Place the instep of one foot so it is touching the big toe of the other foot. TIME:  10+ seconds  3. Tandem stand: Place one foot in front of the other, heel touching toe. TIME:  5-10 seconds  4. Stand on one foot. TIME:  5 seconds bilaterally    Directions: There are four standing positions that get progressively harder to maintain. You should describe and demonstrate each position to the patient. Then,stand next to the patient, hold their arm, and help them assume the correct position. When the patient is steady, let go, and time how long they can maintain the position, but remain ready to assist the patient if they should lose their balance. If the patient can hold a position for 10 seconds without moving their feet or needing support, go on to the next position.  If not, STOP the test. Patients should not use an assistive device (cane or walker) and they should keep their eyes open. An older adult who cannot hold the tandem stand for at least 10 seconds is at increased risk of falling. Single limb stance: R 5 sec / L 5 sec  Tandem Stance: R foot forward - 10 seconds / L foot forward - 5 seconds    Ampac Score:   6.88%    ASSESSMENT/Changes in Function:   Pt is a 80year old female presenting to outpatient PT services with diagnosis of leg pain with lumbar radiculopathy. Pt chief complaint is R>L posterior thigh pain which occurs every morning. At evaluation, pt demonstrates functional strength WNL, ROM WNL for back and hip, and is independent with transfers and gait. We did note mild hamstring and hip flexor tightness, moderate balance deficits including tandem and SLS, and intermittent R glut/thigh pain during session. Pt has not had x-rays of the lumbar spine or hips. Pt will benefit from skilled PT services with focus on stretching, core strengthening, and hip strengthening to reduce pain levels. Problem List/Impairments: pain affecting function and decrease flexibility/ joint mobility  Treatment Plan may include any combination of the following: Therapeutic exercise, Therapeutic activities, Neuromuscular re-education, Physical agent/modality, Gait/balance training, Manual therapy, Patient education, Self Care training, Functional mobility training and Home safety training  Patient/ Caregiver education and instruction: self care and exercises  Frequency / Duration: Patient to be seen 1-2 times per week for 12 treatments. Certification Period: 7/8/22 - 10/6/22    Patient Goal (s): relief from leg pain    [] Met [] Not met [] Partially met   Short Term Goals: To be accomplished in 6 treatments. 1. Patient will report compliance to a progressive HEP. Long Term Goals: To be accomplished in 12 treatments.   1. Patient will report decrease in intensity of thigh pain to 1-2/10 or less each morning. 2. Patient will report decrease in frequency of thigh pain by 50%. 3. Patient will perform SLS bilaterally x10 seconds to improve stability. TODAY'S TREATMENT:   In time:0939am   Out time:1030am  Total Treatment Time (min): 50   Total Timed Codes (min): 10 1:1 Treatment Time ( only): 10   Pain Level (0-10 scale) pre treatment: 0/10     Pain Level (0-10 scale) post treatment: 1-2/10    Evaluation completed: LOW complexity    10 min Therapeutic Exercise:  [x] See flow sheet : Review HEP   Rationale: increase ROM, increase strength and improve balance to improve the patients ability to perform ADLs without pain    With   [x] TE   [] TA   [] Neuro   [] SC   [] other: Patient Education: [x] Review HEP    [] Progressed/Changed HEP based on:   [] positioning   [] body mechanics   [] transfers   [] heat/ice application    [x] other: discuss sleeping positions     [x]  Plan of care has been reviewed with PTA. The Plan of Care is based on information from the initial evaluation. Yana Schneider, PT, DPT 7/8/2022   ________________________________________________________________________    I certify that the above Therapy Services are being furnished while the patient is under my care. I agree with the treatment plan and certify that this therapy is necessary.     Physician's Signature:_________________________________________________  Date:____________Time: ____________     Shannon Srivastava MD

## 2022-07-12 ENCOUNTER — HOSPITAL ENCOUNTER (OUTPATIENT)
Dept: PHYSICAL THERAPY | Age: 82
Discharge: HOME | End: 2022-07-12
Payer: MEDICARE

## 2022-07-12 PROCEDURE — 97112 NEUROMUSCULAR REEDUCATION: CPT

## 2022-07-12 PROCEDURE — 97110 THERAPEUTIC EXERCISES: CPT

## 2022-07-12 NOTE — PROGRESS NOTES
PT DAILY TREATMENT NOTE     Patient Name: Thad Alvarez  Date:2022  : 1940  [x]  Patient  Verified  Payor: Yoli Sepulveda / Plan: VA MEDICARE PART A & B / Product Type: Medicare /    Treatment Area: Other low back pain [M54.59]  Radiculopathy, lumbar region [M54.16]  Pain in right leg [M79.604]  Pain in left leg [M79.605]   Next MD APPT: August  In time:1019am    Out time:1107am  Total Treatment Time (min): 45  Total Timed Codes (min): 45  1:1 Treatment Time ( [address] only): 39   Visit #:     SUBJECTIVE    Any medication changes, allergies to medications, adverse drug reactions, diagnosis change, or new procedure performed?:   [x] No    [] Yes (see summary sheet for update)    Pain Level (0-10 scale) pre treatment: 2/10                    Pain Level (0-10 scale) post treatment: 1-210    Subjective functional status/changes:   [] No changes reported  Pt had severe pain this morning when she first got up and walking around. The pain is now minimal    OBJECTIVE    37 min Therapeutic Exercise:  [x] See flow sheet :   Rationale: increase ROM, increase strength, improve coordination and improve balance to improve the patients ability to get up in the morning with less pain    8 min Neuromuscular Re-education:  [x]  See flow sheet : instructed in TA isometric and pelvic tilts   Rationale: increase ROM, increase strength and improve coordination  to improve the patients ability to get up in the morning with less pain    With   [] TE   [] TA   [] Neuro   [] SC   [] other: Patient Education: [] Review HEP    [] Progressed/Changed HEP based on:   [] positioning   [] body mechanics   [] transfers   [] Use of heat/ice    [] other:          Other Objective/Functional Measures: initiated POC  Increase lordosis and more difficulty isolating posterior pelvic tilt     ASSESSMENT/Changes in Function:   Pt has difficulty with multi-step directions, needs moderate visual and verbal cueing for exercise technique. Otherwise she did well this session, minimal to no pain pre and post session. Pt reported feeling muscle stretch and felt like she had exercised but less pain post-session. Patient will continue to benefit from skilled PT services to modify and progress therapeutic interventions, address strength deficits and instruct in home and community integration to attain remaining goals. GOALS/Progress towards goals:  Patient Goal (s): relief from leg pain    []? Met []? Not met []? Partially met   Short Term Goals: To be accomplished in 6 treatments. 1. Patient will report compliance to a progressive HEP. Long Term Goals: To be accomplished in 12 treatments. 1. Patient will report decrease in intensity of thigh pain to 1-2/10 or less each morning. 2. Patient will report decrease in frequency of thigh pain by 50%.   3. Patient will perform SLS bilaterally x10 seconds to improve stability     PLAN  [x]  Continue plan of care  [x]  Upgrade activities as tolerated       [x]  Update interventions per flow sheet       []  Discharge due to:  []  Other:     Vicenta Townsend, PT, DPT 7/12/2022

## 2022-07-15 ENCOUNTER — HOSPITAL ENCOUNTER (OUTPATIENT)
Dept: PHYSICAL THERAPY | Age: 82
Discharge: HOME | End: 2022-07-15
Payer: MEDICARE

## 2022-07-15 PROCEDURE — 97110 THERAPEUTIC EXERCISES: CPT

## 2022-07-15 PROCEDURE — 97112 NEUROMUSCULAR REEDUCATION: CPT

## 2022-07-15 PROCEDURE — 97140 MANUAL THERAPY 1/> REGIONS: CPT

## 2022-07-15 NOTE — PROGRESS NOTES
PT DAILY TREATMENT NOTE     Patient Name: Mery Dejesus  Date:7/15/2022  : 1940  [x]  Patient  Verified  Payor: Tania Gonzalez / Plan: VA MEDICARE PART A & B / Product Type: Medicare /    Treatment Area: Other low back pain [M54.59]  Radiculopathy, lumbar region [M54.16]  Pain in right leg [M79.604]  Pain in left leg [M79.605]   Next MD APPT: August  In time:1435   Out time:1515  Total Treatment Time (min): 40  Total Timed Codes (min): 40  1:1 Treatment Time ([facility] only): 40   Visit #: 3/12    SUBJECTIVE    Any medication changes, allergies to medications, adverse drug reactions, diagnosis change, or new procedure performed?:   [x] No    [] Yes (see summary sheet for update)    Pain Level (0-10 scale) pre treatment: 5/10                    Pain Level (0-10 scale) post treatment: 1-2/10    Subjective functional status/changes:   [] No changes reported  Pt reports she had some soreness in the lateral gluteal/hip area after last treament session but not bad at all. Felt like pain improved for a while after therapy.       OBJECTIVE    20 min Therapeutic Exercise:  [x] See flow sheet :   Rationale: increase ROM, increase strength, improve coordination and improve balance to improve the patients ability to get up in the morning with less pain    10 min Neuromuscular Re-education:  [x]  See flow sheet : instructed in TA isometric and pelvic tilts   Rationale: increase ROM, increase strength and improve coordination  to improve the patients ability to get up in the morning with less pain    With   [] TE   [] TA   [] Neuro   [] SC   [] other: Patient Education: [] Review HEP    [] Progressed/Changed HEP based on:   [] positioning   [] body mechanics   [] transfers   [] Use of heat/ice    [] other:          Other Objective/Functional Measures: I  Continued with ex per flow sheet    ASSESSMENT/Changes in Function:   Pt has difficulty with multi-step directions, needs moderate visual and verbal cueing for exercise technique. Otherwise she did well this session, minimal to no pain pre and post session. Pt reported feeling muscle stretch and felt like she had exercised but less pain post-session. Patient will continue to benefit from skilled PT services to modify and progress therapeutic interventions, address strength deficits and instruct in home and community integration to attain remaining goals. GOALS/Progress towards goals:  Patient Goal (s): relief from leg pain    [] Met [] Not met [] Partially met   Short Term Goals: To be accomplished in 6 treatments. 1. Patient will report compliance to a progressive HEP. Long Term Goals: To be accomplished in 12 treatments. 1. Patient will report decrease in intensity of thigh pain to 1-2/10 or less each morning. 2. Patient will report decrease in frequency of thigh pain by 50%.   3. Patient will perform SLS bilaterally x10 seconds to improve stability     PLAN  [x]  Continue plan of care  [x]  Upgrade activities as tolerated       [x]  Update interventions per flow sheet       []  Discharge due to:  []  Other:     Laura Calderon, PT 7/15/2022

## 2022-07-15 NOTE — PROGRESS NOTES
PT DAILY TREATMENT NOTE     Patient Name: Demairo Castillo  Date:7/15/2022  : 1940  [x]  Patient  Verified  Payor: Maryanne Garcia / Plan: VA MEDICARE PART A & B / Product Type: Medicare /    Treatment Area: Other low back pain [M54.59]  Radiculopathy, lumbar region [M54.16]  Pain in right leg [M79.604]  Pain in left leg [M79.605]   Next MD APPT: August  In time:1435   Out time:1515  Total Treatment Time (min): 40  Total Timed Codes (min): 40  1:1 Treatment Time ([facility] only): 40   Visit #: 3/12    SUBJECTIVE    Any medication changes, allergies to medications, adverse drug reactions, diagnosis change, or new procedure performed?:   [x] No    [] Yes (see summary sheet for update)    Pain Level (0-10 scale) pre treatment: 5/10                    Pain Level (0-10 scale) post treatment: 1-2/10    Subjective functional status/changes:   [] No changes reported  Pt reports she had some soreness in the lateral gluteal/hip area after last treament session but not bad at all. Felt like pain improved for a while after therapy. OBJECTIVE    15 min Therapeutic Exercise:  [x] See flow sheet :   Rationale: increase ROM, increase strength, improve coordination and improve balance to improve the patients ability to get up in the morning with less pain    10 Min Manual Therapy: R piriformis release, QL and superior gluteal    Rationale: decrease pain, increase tissue extensibility and decrease trigger points to improve the patients ability to getup in the am with decreased pain.   15 min Neuromuscular Re-education:  [x]  See flow sheet : instructed in TA isometric and pelvic tilts   Rationale: increase ROM, increase strength and improve coordination  to improve the patients ability to get up in the morning with less pain    With   [x] TE   [] TA   [] Neuro   [] SC   [] other: Patient Education: [x] Review HEP    [] Progressed/Changed HEP based on:   [] positioning   [] body mechanics   [] transfers   [] Use of heat/ice [] other:          Other Objective/Functional Measures: I  Continued with ex per flow sheet, PPT supine with VC to initiate Bridge to improve technique. No pain with ex  Verbal and tactile cues for all activities    ASSESSMENT/Changes in Function:   Pt has difficulty with multi-step directions, needs moderate visual and verbal cueing for exercise technique. Improved ability to do PPT, excessive LE ER with standing hip ABD, may do better in S/L. Pt has some difficulty with pain scale, uses faces scale and reported feeling much better post therapy. Patient will continue to benefit from skilled PT services to modify and progress therapeutic interventions, address strength deficits and instruct in home and community integration to attain remaining goals. GOALS/Progress towards goals:  Patient Goal (s): relief from leg pain    []? Met []? Not met []? Partially met   Short Term Goals: To be accomplished in 6 treatments. 1. Patient will report compliance to a progressive HEP. Long Term Goals: To be accomplished in 12 treatments. 1. Patient will report decrease in intensity of thigh pain to 1-2/10 or less each morning. 2. Patient will report decrease in frequency of thigh pain by 50%.   3. Patient will perform SLS bilaterally x10 seconds to improve stability     PLAN  [x]  Continue plan of care  [x]  Upgrade activities as tolerated       [x]  Update interventions per flow sheet       []  Discharge due to:  []  Other:     Jennifer High, PT 7/15/2022

## 2022-07-19 ENCOUNTER — HOSPITAL ENCOUNTER (OUTPATIENT)
Dept: PHYSICAL THERAPY | Age: 82
Discharge: HOME | End: 2022-07-19
Payer: MEDICARE

## 2022-07-19 PROCEDURE — 97140 MANUAL THERAPY 1/> REGIONS: CPT

## 2022-07-19 PROCEDURE — 97110 THERAPEUTIC EXERCISES: CPT

## 2022-07-19 NOTE — PROGRESS NOTES
PT DAILY TREATMENT NOTE     Patient Name: Augusta Camilo  Date:2022  : 1940  [x]  Patient  Verified  Payor: Deborah Vu / Plan: VA MEDICARE PART A & B / Product Type: Medicare /    Treatment Area: Other low back pain [M54.59]  Radiculopathy, lumbar region [M54.16]  Pain in right leg [M79.604]  Pain in left leg [M79.605]   Next MD APPT: August  In time: 8:50 am Out time: 9:50 am  Total Treatment Time (min): 60  Total Timed Codes (min): 60  1:1 Treatment Time ( [address] only): 51   Visit #:     SUBJECTIVE    Any medication changes, allergies to medications, adverse drug reactions, diagnosis change, or new procedure performed?:   [x] No    [] Yes (see summary sheet for update)    Pain Level (0-10 scale) pre treatment: 3-4/10                    Pain Level (0-10 scale) post treatment: 2/10    Subjective functional status/changes:   [] No changes reported  Pt reports feeling good after last session. Had symptoms this morning causing pain to be 5-6/10 based off of FACES scale primarily on the R side. OBJECTIVE    41 min Therapeutic Exercise:  [x] See flow sheet :   Rationale: increase ROM, increase strength, improve coordination and improve balance to improve the patients ability to get up in the morning with less pain    10 Min Manual Therapy: superior gluteal release of R glue performed in sidelying   Rationale: decrease pain, increase tissue extensibility and decrease trigger points to improve the patients ability to getup in the am with decreased pain.      min Neuromuscular Re-education:  [x]  See flow sheet :    Rationale: increase ROM, increase strength and improve coordination  to improve the patients ability to get up in the morning with less pain    With   [x] TE   [] TA   [] Neuro   [] SC   [] other: Patient Education: [x] Review HEP    [] Progressed/Changed HEP based on:   [] positioning   [] body mechanics   [] transfers   [] Use of heat/ice    [x] other: Educated patient on trying to sleep on side with pillow between knees and doing flexion based stretches in the morning to help with initial pain          Other Objective/Functional Measures: I  Continued with ex per flow sheet, progressed repetitions  Added passive sciatic nerve glides, repeated forward flexion, and flexion biased cat camel   Verbal and tactile cues for all activities    ASSESSMENT/Changes in Function:   Pt appears to respond best to visual cues. Tactile cues are also effective. Pt was relatively pain free during treatment. Had some increased pain when getting standing from the plinth. Performed repeated flexion in standing and patient reported decrease pain down R leg. When describing pain points to back of knee. Pt responded well to new flexion based exercises as well. Had significant trigger points in superior gluteal region. Pt still has excessive ER with standing ABD even with visual and tactile cues. Pt educated on sleeping positioning and doing stretches in the morning when she is more painful. Wrote down and gave exercises to patient again. Concerns of HEP compliance due to memory difficulties. Pt asked if there was medication to help with her pain symptoms. Told patient she would have to discuss with her doctor but to wait until after more PT sessions had been completed. Pt reported feeling much better by end of session. Patient will continue to benefit from skilled PT services to modify and progress therapeutic interventions, address strength deficits and instruct in home and community integration to attain remaining goals. GOALS/Progress towards goals:  Patient Goal (s): relief from leg pain    []? Met []? Not met []? Partially met   Short Term Goals: To be accomplished in 6 treatments. [] Met [] Not met [] Partially met   1. Patient will report compliance to a progressive HEP. Long Term Goals: To be accomplished in 12 treatments.   1. Patient will report decrease in intensity of thigh pain to 1-2/10 or less each morning. 2. Patient will report decrease in frequency of thigh pain by 50%. 3. Patient will perform SLS bilaterally x10 seconds to improve stability     PLAN  [x]  Continue plan of care  [x]  Upgrade activities as tolerated       [x]  Update interventions per flow sheet       []  Discharge due to:  [x]  Other: Try S/L SLR ABD to decrease ER     Patient was informed and agreed to allow student to observe and participate in medical care. Patient was seen by student and licensed therapist who is in agreement with the above documentation.       Suha Drew, SPT 7/19/2022

## 2022-07-21 ENCOUNTER — HOSPITAL ENCOUNTER (OUTPATIENT)
Dept: PHYSICAL THERAPY | Age: 82
Discharge: HOME | End: 2022-07-21
Payer: MEDICARE

## 2022-07-21 PROCEDURE — 97140 MANUAL THERAPY 1/> REGIONS: CPT

## 2022-07-21 PROCEDURE — 97110 THERAPEUTIC EXERCISES: CPT

## 2022-07-21 NOTE — PROGRESS NOTES
PT DAILY TREATMENT NOTE     Patient Name: Dwight Hendricks  UEWX:  : 1940  [x]  Patient  Verified  Payor: [place] [employer] / Plan: [place] MED[place] & B / Product Type: Medicare /    Treatment Area: Other low back pain [M54.59]  Radiculopathy, lumbar region [M54.16]  Pain in right leg [M79.604]  Pain in left leg [M79.605]   Next MD APPT: August  In time: 8:55 am Out time: 9:43am  Total Treatment Time (min): 48  Total Timed Codes (min): 48  1:1 Treatment Time ( only): 48  Visit #:     SUBJECTIVE    Any medication changes, allergies to medications, adverse drug reactions, diagnosis change, or new procedure performed?:   [x] No    [] Yes (see summary sheet for update)    Pain Level (0-10 scale) pre treatment: 3-4/10                    Pain Level (0-10 scale) post treatment: 0/10    Subjective functional status/changes:   [] No changes reported  Pt reports pain in the R hip to the thigh. Tried sleeping with pillow between the knees and feels like that has helped. Reports doing one or two stretches in the morning before getting up. Was at a 3-4/10 this morning but was at a 1-2/10 Wednesday morning. OBJECTIVE    38 min Therapeutic Exercise:  [x] See flow sheet :   Rationale: increase ROM, increase strength, improve coordination and improve balance to improve the patients ability to get up in the morning with less pain    10 Min Manual Therapy: superior gluteal release of R glute and trigger point release inferior to greater trochanter performed in sidelying   Rationale: decrease pain, increase tissue extensibility and decrease trigger points to improve the patients ability to getup in the am with decreased pain.      min Neuromuscular Re-education:  [x]  See flow sheet :    Rationale: increase ROM, increase strength and improve coordination  to improve the patients ability to get up in the morning with less pain    With   [x] TE   [] TA   [] Neuro   [] SC   [] other: Patient Education: [x] Review HEP    [] Progressed/Changed HEP based on:   [] positioning   [] body mechanics   [] transfers   [] Use of heat/ice    [] other:          Other Objective/Functional Measures: I  Continued with ex per flow sheet  Added S/L Hip ABD, Supine marches with ball, and Reverse curls   Verbal and tactile cues for all activities    ASSESSMENT/Changes in Function:   Pt responded well to trigger point release of two areas around R hip. Performed manual before exercises today. Pt has difficulty with carry over of exercises from previous session. Pt needed constant cues for step ups, both visual and verbal. During S/L ABD, pt needed significant tactile cues as she displayed compensation with TFL/Hip flexors trying to overpower the glutes. However, did notice less ER compared to standing ABD. Exercises with swiss ball required verbal cues to decrease speed and increase control. Pt seems to be responding well to flexion based exercise. Look to continue in future sessions. Patient will continue to benefit from skilled PT services to modify and progress therapeutic interventions, address strength deficits and instruct in home and community integration to attain remaining goals. GOALS/Progress towards goals:  Patient Goal (s): relief from leg pain    [] Met [] Not met [] Partially met   Short Term Goals: To be accomplished in 6 treatments. 1. Patient will report compliance to a progressive HEP. [x] Met [] Not met [] Partially met 7/21/22  Long Term Goals: To be accomplished in 12 treatments. 1. Patient will report decrease in intensity of thigh pain to 1-2/10 or less each morning. [] Met [] Not met [] Partially met   2. Patient will report decrease in frequency of thigh pain by 50%. [] Met [] Not met [] Partially met   3.  Patient will perform SLS bilaterally x10 seconds to improve stability [] Met [] Not met [] Partially met      PLAN  [x]  Continue plan of care  [x]  Upgrade activities as tolerated       [x] Update interventions per flow sheet       []  Discharge due to:  []  Other:      Patient was informed and agreed to allow student to observe and participate in medical care. Patient was seen by student and licensed therapist who is in agreement with the above documentation.       Madison Cardozo, SPT 7/21/2022

## 2022-07-26 ENCOUNTER — HOSPITAL ENCOUNTER (OUTPATIENT)
Dept: PHYSICAL THERAPY | Age: 82
Discharge: HOME | End: 2022-07-26
Payer: MEDICARE

## 2022-07-26 PROCEDURE — 97110 THERAPEUTIC EXERCISES: CPT

## 2022-07-26 NOTE — PROGRESS NOTES
PT DAILY TREATMENT NOTE     Patient Name: Farideh French  Date:2022  : 1940  [x]  Patient  Verified  Payor: Rick Ni / Plan: [place] MEDICARE PART A & B / Product Type: Medicare /    Treatment Area: Other low back pain [M54.59]  Radiculopathy, lumbar region [M54.16]  Pain in right leg [M79.604]  Pain in left leg [M79.605]   Next MD APPT: August  In time: 0100pm Out time: 0148pm  Total Treatment Time (min): 48  Total Timed Codes (min): 48  1:1 Treatment Time ([facility] only): 48  Visit #:     SUBJECTIVE    Any medication changes, allergies to medications, adverse drug reactions, diagnosis change, or new procedure performed?:   [x] No    [] Yes (see summary sheet for update)    Pain Level (0-10 scale) pre treatment: 0/10                    Pain Level (0-10 scale) post treatment: 0/10    Subjective functional status/changes:   [] No changes reported  Pt continues to report pain in the morning when waking up. Today pain was moderate and points to the distal hamstring bilaterally. States it lasted for 15 mins. OBJECTIVE    43 min Therapeutic Exercise:  [x] See flow sheet :   Rationale: increase ROM, increase strength, improve coordination and improve balance to improve the patients ability to get up in the morning with less pain    5 Min Manual Therapy: superior gluteal release of R glute and trigger point release inferior to greater trochanter performed in sidelying   Rationale: decrease pain, increase tissue extensibility and decrease trigger points to improve the patients ability to getup in the am with decreased pain.      min Neuromuscular Re-education:  [x]  See flow sheet :    Rationale: increase ROM, increase strength and improve coordination  to improve the patients ability to get up in the morning with less pain    With   [x] TE   [] TA   [] Neuro   [] SC   [] other: Patient Education: [x] Review HEP    [] Progressed/Changed HEP based on:   [] positioning   [] body mechanics   [] transfers [] Use of heat/ice    [] other:          Other Objective/Functional Measures: Added fire hydrants, piriformis stretch  Decrease TTP through glut med today, notes decrease in tenderness within a few minutes of accupressure to trigger point    ASSESSMENT/Changes in Function:   Pt required less cueing today with familiar exercises. Pain is mostly at the distal hamstring and pt reported mild pain similar her chief complaint with sciatic nerve glides. With piriformis stretch noted stretch primarily in the hamstrings. Pt seems to be reporting decreased intensity of morning pains. Patient will continue to benefit from skilled PT services to modify and progress therapeutic interventions, address strength deficits and instruct in home and community integration to attain remaining goals. GOALS/Progress towards goals:  Patient Goal (s): relief from leg pain    [] Met [] Not met [] Partially met   Short Term Goals: To be accomplished in 6 treatments. 1. Patient will report compliance to a progressive HEP. [x] Met [] Not met [] Partially met 7/21/22  Long Term Goals: To be accomplished in 12 treatments. 1. Patient will report decrease in intensity of thigh pain to 1-2/10 or less each morning. [] Met [] Not met [] Partially met   2. Patient will report decrease in frequency of thigh pain by 50%. [] Met [] Not met [] Partially met   3.  Patient will perform SLS bilaterally x10 seconds to improve stability [] Met [] Not met [] Partially met      PLAN  [x]  Continue plan of care  [x]  Upgrade activities as tolerated       [x]  Update interventions per flow sheet       []  Discharge due to:  []  Other:      Adriana Sheikh, PT, DPT 7/26/2022

## 2022-07-28 ENCOUNTER — HOSPITAL ENCOUNTER (OUTPATIENT)
Dept: PHYSICAL THERAPY | Age: 82
Discharge: HOME | End: 2022-07-28
Payer: MEDICARE

## 2022-07-28 PROCEDURE — 97140 MANUAL THERAPY 1/> REGIONS: CPT

## 2022-07-28 PROCEDURE — 97110 THERAPEUTIC EXERCISES: CPT

## 2022-07-28 NOTE — PROGRESS NOTES
PT DAILY TREATMENT NOTE     Patient Name: Jim Augustin  Date:2022  : 1940  [x]  Patient  Verified  Payor: Cheyanne Brar / Plan: [place] MEDICARE PART A & B / Product Type: Medicare /    Treatment Area: Other low back pain [M54.59]  Radiculopathy, lumbar region [M54.16]  Pain in right leg [M79.604]  Pain in left leg [M79.605]   Next MD APPT: August  In time: 2:15pm Out time: 2:54pm  Total Treatment Time (min): 39  Total Timed Codes (min): 39  1:1 Treatment Time ([facility] only): 39  Visit #:     SUBJECTIVE    Any medication changes, allergies to medications, adverse drug reactions, diagnosis change, or new procedure performed?:   [x] No    [] Yes (see summary sheet for update)    Pain Level (0-10 scale) pre treatment: 6/10                      Pain Level (0-10 scale) post treatment: 3/10    Subjective functional status/changes:   [] No changes reported  Pt reports pain 6/10 in RLE, pointing to anterior hip/ hip flexors and quads. No other complaints reported. OBJECTIVE    31 min Therapeutic Exercise:  [x] See flow sheet :   Rationale: increase ROM, increase strength, improve coordination and improve balance to improve the patients ability to get up in the morning with less pain    8 Min Manual Therapy: superior gluteal release of R glute and trigger point release inferior to greater trochanter performed in sidelying, prone hip flexor/quad and piriformis stretch. Rationale: decrease pain, increase tissue extensibility and decrease trigger points to improve the patients ability to getup in the am with decreased pain.      min Neuromuscular Re-education:  [x]  See flow sheet :    Rationale: increase ROM, increase strength and improve coordination  to improve the patients ability to get up in the morning with less pain    With   [x] TE   [] TA   [] Neuro   [] SC   [] other: Patient Education: [x] Review HEP    [] Progressed/Changed HEP based on:   [] positioning   [] body mechanics   [] transfers [] Use of heat/ice    [] other:          Other Objective/Functional Measures:     ASSESSMENT/Changes in Function:   Pt tolerated session well with no exacerbation of symptoms. Manual tx with trigger pt release continues to yield relief. Pt required mod cues during exercises for proper form, demonstrating fair/good carryover. Plan to continue per POC as tolerated. Patient will continue to benefit from skilled PT services to modify and progress therapeutic interventions, address strength deficits and instruct in home and community integration to attain remaining goals. GOALS/Progress towards goals:  Patient Goal (s): relief from leg pain    [] Met [] Not met [] Partially met   Short Term Goals: To be accomplished in 6 treatments. 1. Patient will report compliance to a progressive HEP. [x] Met [] Not met [] Partially met 7/21/22  Long Term Goals: To be accomplished in 12 treatments. 1. Patient will report decrease in intensity of thigh pain to 1-2/10 or less each morning. [] Met [] Not met [] Partially met   2. Patient will report decrease in frequency of thigh pain by 50%. [] Met [] Not met [] Partially met   3.  Patient will perform SLS bilaterally x10 seconds to improve stability [] Met [] Not met [] Partially met      PLAN  [x]  Continue plan of care  [x]  Upgrade activities as tolerated       [x]  Update interventions per flow sheet       []  Discharge due to:  []  Other:      Keysha Liner, PTA 7/28/2022

## 2022-08-02 ENCOUNTER — HOSPITAL ENCOUNTER (OUTPATIENT)
Dept: PHYSICAL THERAPY | Age: 82
Discharge: HOME | End: 2022-08-02
Payer: MEDICARE

## 2022-08-02 PROCEDURE — 97110 THERAPEUTIC EXERCISES: CPT

## 2022-08-02 NOTE — PROGRESS NOTES
PT DAILY TREATMENT NOTE     Patient Name: Karla Ibrahim  QPVP:0/3/2526  : 1940  [x]  Patient  Verified  Payor: Óscar Alt / Plan: [place] MEDICARE PART A & B / Product Type: Medicare /    Treatment Area: Other low back pain [M54.59]  Radiculopathy, lumbar region [M54.16]  Pain in right leg [M79.604]  Pain in left leg [M79.605]   Next MD APPT: August  In time: 230pm Out time: 315pm  Total Treatment Time (min): 45  Total Timed Codes (min): 40  1:1 Treatment Time ([facility] only): 40  Visit #:     SUBJECTIVE    Any medication changes, allergies to medications, adverse drug reactions, diagnosis change, or new procedure performed?:   [x] No    [] Yes (see summary sheet for update)    Pain Level (0-10 scale) pre treatment: 3/10                      Pain Level (0-10 scale) post treatment: 1/10    Subjective functional status/changes:   [] No changes reported  Pt reports that she feels most of her pain in the morning, and after being in  cold environment for an extended period of time. OBJECTIVE    40 min Therapeutic Exercise:  [x] See flow sheet :   Rationale: increase ROM, increase strength, improve coordination and improve balance to improve the patients ability to get up in the morning with less pain     Min Manual Therapy: superior gluteal release of R glute and trigger point release inferior to greater trochanter performed in sidelying, prone hip flexor/quad and piriformis stretch. Rationale: decrease pain, increase tissue extensibility and decrease trigger points to improve the patients ability to getup in the am with decreased pain.      min Neuromuscular Re-education:  [x]  See flow sheet :    Rationale: increase ROM, increase strength and improve coordination  to improve the patients ability to get up in the morning with less pain    With   [x] TE   [] TA   [] Neuro   [] SC   [] other: Patient Education: [x] Review HEP    [] Progressed/Changed HEP based on:   [] positioning   [] body mechanics [] transfers   [] Use of heat/ice    [] other:          Other Objective/Functional Measures:     ASSESSMENT/Changes in Function:   Pt tolerated session well with no exacerbation of symptoms. Pt required mod cues during exercises for proper form, demonstrating fair/good carryover. Plan to continue per POC as tolerated. Patient will continue to benefit from skilled PT services to modify and progress therapeutic interventions, address strength deficits and instruct in home and community integration to attain remaining goals. GOALS/Progress towards goals:  Patient Goal (s): relief from leg pain    [] Met [] Not met [] Partially met   Short Term Goals: To be accomplished in 6 treatments. 1. Patient will report compliance to a progressive HEP. [x] Met [] Not met [] Partially met 7/21/22  Long Term Goals: To be accomplished in 12 treatments. 1. Patient will report decrease in intensity of thigh pain to 1-2/10 or less each morning. [] Met [] Not met [] Partially met   2. Patient will report decrease in frequency of thigh pain by 50%. [] Met [] Not met [] Partially met   3.  Patient will perform SLS bilaterally x10 seconds to improve stability [] Met [] Not met [] Partially met      PLAN  [x]  Continue plan of care  [x]  Upgrade activities as tolerated       [x]  Update interventions per flow sheet       []  Discharge due to:  []  Other:      Henrique Mendez, PTA 8/2/2022

## 2022-08-04 ENCOUNTER — APPOINTMENT (OUTPATIENT)
Dept: PHYSICAL THERAPY | Age: 82
End: 2022-08-04
Payer: MEDICARE

## 2022-08-05 ENCOUNTER — HOSPITAL ENCOUNTER (OUTPATIENT)
Dept: PHYSICAL THERAPY | Age: 82
Discharge: HOME | End: 2022-08-05
Payer: MEDICARE

## 2022-08-05 PROCEDURE — 97110 THERAPEUTIC EXERCISES: CPT

## 2022-08-05 NOTE — PROGRESS NOTES
PT DAILY TREATMENT NOTE     Patient Name: Thad Alvarez  APDW:8/3/0528  : 1940  [x]  Patient  Verified  Payor: Yoli Sepulveda / Plan: [place] MEDICARE PART A & B / Product Type: Medicare /    Treatment Area: Other low back pain [M54.59]  Radiculopathy, lumbar region [M54.16]  Pain in right leg [M79.604]  Pain in left leg [M79.605]   Next MD APPT: August  In time: 01:50 pm  Out time: 01:40 pm  Total Treatment Time (min): 50 min  Total Timed Codes (min): 50 min  1:1 Treatment Time ( only): 50 min  Visit #:     SUBJECTIVE    Any medication changes, allergies to medications, adverse drug reactions, diagnosis change, or new procedure performed?:   [x] No    [] Yes (see summary sheet for update)    Pain Level (0-10 scale) pre treatment: 0/10                      Pain Level (0-10 scale) post treatment: 0/10    Subjective functional status/changes:   [] No changes reported  Pt reports  her pain is mainly in the morning. She reports 80% improvement since receiving therapy. Pain at best 3-4/10, best 0/10. Patient also reports a decrease in thigh pain R > L. She complains of pain to hamstrings and ankles. \"Sometimes I have to walk around my house once or twice before pain goes away. OBJECTIVE    50 min Therapeutic Exercise:  [x] See flow sheet :   Rationale: increase ROM, increase strength, improve coordination and improve balance to improve the patients ability to get up in the morning with less pain     Min Manual Therapy: superior gluteal release of R glute and trigger point release inferior to greater trochanter performed in sidelying, prone hip flexor/quad and piriformis stretch. Rationale: decrease pain, increase tissue extensibility and decrease trigger points to improve the patients ability to getup in the am with decreased pain.      min Neuromuscular Re-education:  [x]  See flow sheet :    Rationale: increase ROM, increase strength and improve coordination  to improve the patients ability to get up in the morning with less pain    With   [x] TE   [] TA   [] Neuro   [] SC   [] other: Patient Education: [x] Review HEP    [] Progressed/Changed HEP based on:   [] positioning   [] body mechanics   [] transfers   [] Use of heat/ice    [x] other: Reassessed , Also discussed with patient talking to MD about OTC medication for arthritis. Other Objective/Functional Measures: added hamstring stretches to B LE.       Spine:  TRUNK ROM:      Flexion:                                  [] N/A  [x]WNL  []Minimal  []Moderate  [] Major  Extension:                             [] N/A  [x]WNL  []Minimal  []Moderate  [] Major    Right Lateral Flexion:           [] N/A  [x]WNL  []Minimal  []Moderate  [] Major    Left Lateral Flexion:              [] N/A  [x]WNL  []Minimal  []Moderate  [] Major  Rotation to the Right:           [] N/A  [x]WNL  []Minimal  []Moderate  [] Major  Rotation to the Left:              [] N/A  [x]WNL  []Minimal  []Moderate  [] Major  Right Side Glide:                   [] N/A  []WNL  []Minimal  []Moderate  [] Major  Left Side Glide:                     [] N/A  []WNL  []Minimal  []Moderate  [] Major  Additional comments:      Specific joints:      Hip                                AROM                            PROM                              MMT    R L R L R L   Flexion (120) WNL WNL WNL WNL 5 5   Extension (15)         5 5   Abduction (40)         5 5   Adduction (30)               IR (40)               ER (40)               Additional comments:    Hamstring : R -20 deg,  L -30 deg       Knee                                 AROM                          PROM                           MMT    R L R L R L   Extension (0)          5 5   Flexion (145)         5 5   Additional comments: -     Ankle                                 AROM                       PROM                             MMT    R L R L R L   Dorsiflexion (15)         5 5   Plantarflexion (50)          5  5   Additional comments: -     SLS: R 14 sec, L 4 sec                                               AM-PAC: no change form initial  6.88%    ASSESSMENT/Changes in Function:   Patient has made progress with her MMT to LE. She has hamstring tightness L > R , but reporting R more painful. She instability in her ankles L > R as noted with SLS. Patient able to ascend and descend stairs using reciprocal pattern and 0 HHA. She has noticed decrease in thigh pain but reports R still painful at times. No pain pre or post treatment session. Patient will continue to benefit from skilled PT services to modify and progress therapeutic interventions, address strength deficits and instruct in home and community integration to attain remaining goals. GOALS/Progress towards goals:  Patient Goal (s): relief from leg pain    [] Met [] Not met [] Partially met   Short Term Goals: To be accomplished in 6 treatments. 1. Patient will report compliance to a progressive HEP. [x] Met [] Not met [] Partially met 7/21/22  Long Term Goals: To be accomplished in 12 treatments. 1. Patient will report decrease in intensity of thigh pain to 1-2/10 or less each morning. [] Met [x] Not met [] Partially met   2. Patient will report decrease in frequency of thigh pain by 50%. [x] Met [] Not met [] Partially met   3.  Patient will perform SLS bilaterally x10 seconds to improve stability [] Met [x] Not met [] Partially met      PLAN  [x]  Continue plan of care  [x]  Upgrade activities as tolerated       [x]  Update interventions per flow sheet       []  Discharge due to:  []  Other:      Anya Lopez, TEMO 8/5/2022

## 2022-08-09 ENCOUNTER — HOSPITAL ENCOUNTER (OUTPATIENT)
Dept: PHYSICAL THERAPY | Age: 82
Discharge: HOME | End: 2022-08-09
Payer: MEDICARE

## 2022-08-09 PROCEDURE — 97110 THERAPEUTIC EXERCISES: CPT

## 2022-08-09 NOTE — PROGRESS NOTES
PT DAILY TREATMENT NOTE     Patient Name: Camacho Sanon  TFKK:  : 1940  [x]  Patient  Verified  Payor: Kelli Batista / Plan: [place] MEDICARE PART A & B / Product Type: Medicare /    Treatment Area: Other low back pain [M54.59]  Radiculopathy, lumbar region [M54.16]  Pain in right leg [M79.604]  Pain in left leg [M79.605]   Next MD APPT: August  In time: 230pm Out time: 315pm  Total Treatment Time (min): 45  Total Timed Codes (min): 40  1:1 Treatment Time ([facility] only): 40  Visit #: 10/12    SUBJECTIVE    Any medication changes, allergies to medications, adverse drug reactions, diagnosis change, or new procedure performed?:   [x] No    [] Yes (see summary sheet for update)    Pain Level (0-10 scale) pre treatment: 3/10                      Pain Level (0-10 scale) post treatment: 1/10    Subjective functional status/changes:   [] No changes reported  Pt states that she is feeling more pain and tingling along her outer thigh today. OBJECTIVE    40 min Therapeutic Exercise:  [x] See flow sheet :   Rationale: increase ROM, increase strength, improve coordination and improve balance to improve the patients ability to get up in the morning with less pain     Min Manual Therapy: superior gluteal release of R glute and trigger point release inferior to greater trochanter performed in sidelying, prone hip flexor/quad and piriformis stretch. Rationale: decrease pain, increase tissue extensibility and decrease trigger points to improve the patients ability to getup in the am with decreased pain.      min Neuromuscular Re-education:  [x]  See flow sheet :    Rationale: increase ROM, increase strength and improve coordination  to improve the patients ability to get up in the morning with less pain    With   [x] TE   [] TA   [] Neuro   [] SC   [] other: Patient Education: [x] Review HEP    [] Progressed/Changed HEP based on:   [] positioning   [] body mechanics   [] transfers   [] Use of heat/ice    [] other: Other Objective/Functional Measures:     ASSESSMENT/Changes in Function:   Pt tolerated session well with no exacerbation of symptoms. Pt required mod cues during exercises for proper form, demonstrating fair/good carryover. Plan to continue per POC as tolerated. Pt expressed mild relief of symptoms post session and was encouraged to use heat or ice at home if pain comes back. Patient will continue to benefit from skilled PT services to modify and progress therapeutic interventions, address strength deficits and instruct in home and community integration to attain remaining goals. GOALS/Progress towards goals:  Patient Goal (s): relief from leg pain    [] Met [] Not met [] Partially met   Short Term Goals: To be accomplished in 6 treatments. 1. Patient will report compliance to a progressive HEP. [x] Met [] Not met [] Partially met 7/21/22  Long Term Goals: To be accomplished in 12 treatments. 1. Patient will report decrease in intensity of thigh pain to 1-2/10 or less each morning. [] Met [] Not met [] Partially met   2. Patient will report decrease in frequency of thigh pain by 50%. [] Met [] Not met [] Partially met   3.  Patient will perform SLS bilaterally x10 seconds to improve stability [] Met [] Not met [] Partially met      PLAN  [x]  Continue plan of care  [x]  Upgrade activities as tolerated       [x]  Update interventions per flow sheet       []  Discharge due to:  []  Other:      Lenard Butts., PTA 8/9/2022

## 2022-08-11 ENCOUNTER — APPOINTMENT (OUTPATIENT)
Dept: PHYSICAL THERAPY | Age: 82
End: 2022-08-11
Payer: MEDICARE

## 2022-08-11 NOTE — PROGRESS NOTES
[address] [facility], [address] [place] [address]  Ph: [phone]    Fax: [phone]  Therapy Progress Report  Name: Zoltan Trmamell   : 1940   MD: Mukesh Adams MD     Treatment Diagnosis: Other low back pain [M54.59]  Radiculopathy, lumbar region [M54.16]  Pain in right leg [M79.604]  Pain in left leg [M79.605]  Start of Care: 22 Visits from Start of Care: 8  Missed Visits: 2  Problem List/Impairments: pain affecting function, decrease strength, impaired gait/ balance, and decrease flexibility/ joint mobility    Summary of Care/Goals:  Patient has made progress with PT services, meeting a 2 of 4 goals. She tolerates exercises well with minimal pain during sessions. She does continue to report morning pain which improves as she walks around the house after waking. Pt does note about 80% improvement with therapy and notes some improvement in the frequency and intensity of pain symptoms. Patient will continue to benefit from skilled PT services to modify and progress therapeutic interventions, address strength deficits and instruct in home and community integration to attain remaining goals.       Spine:  TRUNK ROM:      Flexion:                                  [] N/A  [x]WNL  []Minimal  []Moderate  [] Major  Extension:                             [] N/A  [x]WNL  []Minimal  []Moderate  [] Major    Right Lateral Flexion:           [] N/A  [x]WNL  []Minimal  []Moderate  [] Major    Left Lateral Flexion:              [] N/A  [x]WNL  []Minimal  []Moderate  [] Major  Rotation to the Right:           [] N/A  [x]WNL  []Minimal  []Moderate  [] Major  Rotation to the Left:              [] N/A  [x]WNL  []Minimal  []Moderate  [] Major  Right Side Glide:                   [] N/A  []WNL  []Minimal  []Moderate  [] Major  Left Side Glide:                     [] N/A  []WNL  []Minimal  []Moderate  [] Major  Additional comments:      Specific joints:     Hip AROM                            PROM                              MMT    R L R L R L   Flexion (120) WNL WNL WNL WNL 5 5   Extension (15)         5 5   Abduction (40)         5 5   Adduction (30)               IR (40)               ER (40)               Additional comments:     Hamstring : R -20 deg,  L -30 deg        Knee                                 AROM                          PROM                           MMT    R L R L R L   Extension (0)          5 5   Flexion (145)         5 5   Additional comments: -     Ankle                                 AROM                       PROM                             MMT    R L R L R L   Dorsiflexion (15)         5 5   Plantarflexion (50)          5  5   Additional comments: -     SLS: R 14 sec, L 4 sec                                               AM-PAC: no change from initial  6.88%       GOALS/Progress towards goals:  Patient Goal (s): relief from leg pain    [] Met [] Not met [] Partially met  Short Term Goals: To be accomplished in 6 treatments. 1. Patient will report compliance to a progressive HEP. [x] Met [] Not met [] Partially met 7/21/22  Long Term Goals: To be accomplished in 12 treatments. 1. Patient will report decrease in intensity of thigh pain to 1-2/10 or less each morning. [] Met [x] Not met [] Partially met  2. Patient will report decrease in frequency of thigh pain by 50%. [x] Met [] Not met [] Partially met  3. Patient will perform SLS bilaterally x10 seconds to improve stability [] Met [x] Not met [] Partially met     Recommendations: Continue PT per POC. Frequency / Duration: Patient to be seen 2 times per week for 12 treatments.   Certification Period (if applicable): 4/2/26 - 75/2/48  Treatment Plan may include any combination of the following: Therapeutic exercise, Therapeutic activities, Neuromuscular re-education, Gait/balance training, Manual therapy, Patient education, Self Care training, Functional mobility training, and Home safety training  Patient/ Caregiver education and instruction: self care and exercises  [x]  Plan of care has been reviewed with PTA. Melba Sanders PT, DPT 8/11/2022     ________________________________________________________________________     Please retain this original for your records.

## 2022-08-12 ENCOUNTER — HOSPITAL ENCOUNTER (OUTPATIENT)
Dept: PHYSICAL THERAPY | Age: 82
Discharge: HOME | End: 2022-08-12
Payer: MEDICARE

## 2022-08-12 PROCEDURE — 97110 THERAPEUTIC EXERCISES: CPT

## 2022-08-12 NOTE — PROGRESS NOTES
PT DAILY TREATMENT NOTE     Patient Name: Chin Roach  Date:2022  : 1940  [x]  Patient  Verified  Payor: Deb Williamson / Plan: [place] & [place] / Product Type: Medicare /    Treatment Area: Other low back pain [M54.59]  Radiculopathy, lumbar region [M54.16]  Pain in right leg [M79.604]  Pain in left leg [M79.605]   Next MD APPT: August  In time: 2:30 pm Out time: 3: 25 pm  Total Treatment Time (min): 50 min  Total Timed Codes (min): 50 min  1:1 Treatment Time ( only): 50 min  Visit #:     SUBJECTIVE    Any medication changes, allergies to medications, adverse drug reactions, diagnosis change, or new procedure performed?:   [x] No    [] Yes (see summary sheet for update)    Pain Level (0-10 scale) pre treatment: 3-4/10                      Pain Level (0-10 scale) post treatment: 10    Subjective functional status/changes:   [] No changes reported   Patient stated her back does not bother her. \"My legs really bother me at night when I'm trying to sleep. \"     OBJECTIVE    50 min Therapeutic Exercise:  [x] See flow sheet :   Rationale: increase ROM, increase strength, improve coordination and improve balance to improve the patients ability to get up in the morning with less pain     Min Manual Therapy: superior gluteal release of R glute and trigger point release inferior to greater trochanter performed in sidelying, prone hip flexor/quad and piriformis stretch. Rationale: decrease pain, increase tissue extensibility and decrease trigger points to improve the patients ability to getup in the am with decreased pain.      min Neuromuscular Re-education:  [x]  See flow sheet :    Rationale: increase ROM, increase strength and improve coordination  to improve the patients ability to get up in the morning with less pain    With   [x] TE   [] TA   [] Neuro   [] SC   [] other: Patient Education: [x] Review HEP    [] Progressed/Changed HEP based on:   [] positioning   [] body mechanics [] transfers   [] Use of heat/ice    [] other:          Other Objective/Functional Measures: Added fitter, nk table     ASSESSMENT/Changes in Function:   Patient tolerance to treatment:  [] poor  [] fair  [x] good  []  excellent. Patient does not remember at times exercises she has done previously and requires cues. Patient complaining most of her pain or discomfort is at night with her LE. She did complain of soreness from stretches. She tolerated new exercises without any pain or discomfort and reported afterwards feeling better. Patient will continue to benefit from skilled PT services to modify and progress therapeutic interventions, address strength deficits and instruct in home and community integration to attain remaining goals. GOALS/Progress towards goals:  Patient Goal (s): relief from leg pain    [] Met [x] Not met [] Partially met   Short Term Goals: To be accomplished in 6 treatments. 1. Patient will report compliance to a progressive HEP. [x] Met [] Not met [] Partially met 7/21/22  Long Term Goals: To be accomplished in 12 treatments. 1. Patient will report decrease in intensity of thigh pain to 1-2/10 or less each morning. [] Met [x] Not met [] Partially met   2. Patient will report decrease in frequency of thigh pain by 50%. [x] Met [] Not met [] Partially met   3. Patient will perform SLS bilaterally x10 seconds to improve stability [] Met [x] Not met [] Partially met      PLAN  [x]  Continue plan of care  [x]  Upgrade activities as tolerated       [x]  Update interventions per flow sheet       []  Discharge due to:  [x]  Other:  will need a new POC.      Quin Block, PTA 8/12/2022

## 2022-08-13 DIAGNOSIS — M79.605 BILATERAL LEG PAIN: ICD-10-CM

## 2022-08-13 DIAGNOSIS — M79.604 BILATERAL LEG PAIN: ICD-10-CM

## 2022-08-15 RX ORDER — DICLOFENAC SODIUM 10 MG/G
4 GEL TOPICAL
Qty: 100 G | Refills: 1 | Status: SHIPPED | OUTPATIENT
Start: 2022-08-15

## 2022-08-18 RX ORDER — POTASSIUM CHLORIDE 750 MG/1
TABLET, EXTENDED RELEASE ORAL
Qty: 90 TABLET | Refills: 1 | Status: SHIPPED | OUTPATIENT
Start: 2022-08-18

## 2022-08-19 ENCOUNTER — APPOINTMENT (OUTPATIENT)
Dept: PHYSICAL THERAPY | Age: 82
End: 2022-08-19
Payer: MEDICARE

## 2022-08-19 ENCOUNTER — HOSPITAL ENCOUNTER (OUTPATIENT)
Dept: PHYSICAL THERAPY | Age: 82
Discharge: HOME | End: 2022-08-19
Payer: MEDICARE

## 2022-08-19 PROCEDURE — 97110 THERAPEUTIC EXERCISES: CPT

## 2022-08-19 NOTE — PROGRESS NOTES
Please refer to written documentation and Exercise Flow Sheet in paper lite chart for services rendered this day.

## 2022-08-19 NOTE — PROGRESS NOTES
[address] [address] [facility] [address] [facility], [address] [place] [address]  Ph: [phone]    Fax: [phone]  Plan of Care  Name: Rosalba Alegria  : 1940   MD: Marily Chapa MD     Medical/Treatment Diagnosis: Other low back pain [M54.59]  Radiculopathy, lumbar region [M54.16]  Pain in right leg [M79.604]  Pain in left leg [M79.605]     Problem List/Impairments: pain affecting function, decrease ROM, decrease strength, decrease activity tolerance, and decrease flexibility/ joint mobility    Start of Care: 22   Visits from Start of Care: 12  Missed Visits: 0  Certification Period: 22 - 10/22/22    Frequency/Duration: 1-2 times a week for 6-8 treatments   Treatment Plan may include any combination of the following: Therapeutic exercise, Therapeutic activities, Neuromuscular re-education, Physical agent/modality, Gait/balance training, Manual therapy, Patient education, and Functional mobility training  [x]  Plan of care has been reviewed with PTA. Patient/ Caregiver education and instruction: exercises    Summary of Care/Goals:    Assessment:   Pt has completed 12 of 12 visits. She reports overall 80% improvement since starting therapy. Chief complaint at this time is leg pain in the mornings and occasionally in the evening. She still presents with some inflexibility and muscle imbalance. She has met 2 of 4 goals. Barrier to progress: cognition. She will benefit from continued PT services to assist in eliminating leg pain and maximize her functional mobility independence.       Reassessed on 22  Spine:  TRUNK ROM:      Flexion:                                  [] N/A  [x]WNL  []Minimal  []Moderate  [] Major  Extension:                             [] N/A  [x]WNL  []Minimal  []Moderate  [] Major    Right Lateral Flexion:           [] N/A  [x]WNL  []Minimal  []Moderate  [] Major    Left Lateral Flexion:              [] N/A  [x]WNL  []Minimal  []Moderate [] Major  Rotation to the Right:           [] N/A  [x]WNL  []Minimal  []Moderate  [] Major  Rotation to the Left:              [] N/A  [x]WNL  []Minimal  []Moderate  [] Major  Right Side Glide:                   [] N/A  []WNL  []Minimal  []Moderate  [] Major  Left Side Glide:                     [] N/A  []WNL  []Minimal  []Moderate  [] Major  Additional comments:      Specific joints:     Hip                                AROM                            PROM                              MMT    R L R L R L   Flexion (120) WNL WNL WNL WNL 5 5   Extension (15)         5 5   Abduction (40)         5 5   Adduction (30)               IR (40)               ER (40)               Additional comments:     Hamstring : R -20 deg,  L -30 deg        Knee                                 AROM                          PROM                           MMT    R L R L R L   Extension (0)          5 5   Flexion (145)         5 5   Additional comments: -     Ankle                                 AROM                       PROM                             MMT    R L R L R L   Dorsiflexion (15)         5 5   Plantarflexion (50)          5  5   Additional comments: -     SLS: R 14 sec, L 4 sec                                                      GOALS/Progress towards goals:  Patient Goal (s): relief from leg pain    [] Met [] Not met [] Partially met  Short Term Goals: To be accomplished in 6 treatments. 1. Patient will report compliance to a progressive HEP. [x] Met [] Not met [] Partially met 7/21/22  Long Term Goals: To be accomplished in 12 treatments. 1. Patient will report decrease in intensity of thigh pain to 1-2/10 or less each morning. [] Met [x] Not met [] Partially met  2. Patient will report decrease in frequency of thigh pain by 50%. [x] Met [] Not met [] Partially met  3.  Patient will perform SLS bilaterally x10 seconds to improve stability [] Met [x] Not met [] Partially met     Ampac Score:  6.88% (same as initial assessment)   Recommendations: extend POC for 6-8 more sessions    Nubia Estrada PT, DPT 8/19/2022     Retain this original for your records. If you are unable to process this request in 24 hours, please contact our office.   ________________________________________________________________________  NOTE TO PHYSICIAN:  Please complete the following and fax to: [address]:  Fax: [phone]   ____ I have read the above report and request that my patient continue therapy. ____ I have read the above report and request that my patient continue therapy with the following changes/special instructions:    ____ I have read the above report and request that my patient be discharged from therapy.     Physician's Signature:_______________________________________________ Date:_____________Time:____________      Stacie Garcia MD

## 2022-08-22 ENCOUNTER — APPOINTMENT (OUTPATIENT)
Dept: PHYSICAL THERAPY | Age: 82
End: 2022-08-22
Payer: MEDICARE

## 2022-08-24 ENCOUNTER — APPOINTMENT (OUTPATIENT)
Dept: PHYSICAL THERAPY | Age: 82
End: 2022-08-24
Payer: MEDICARE

## 2022-08-25 ENCOUNTER — TRANSCRIBE ORDER (OUTPATIENT)
Dept: SCHEDULING | Age: 82
End: 2022-08-25

## 2022-08-25 DIAGNOSIS — Z12.31 VISIT FOR SCREENING MAMMOGRAM: Primary | ICD-10-CM

## 2022-08-29 ENCOUNTER — APPOINTMENT (OUTPATIENT)
Dept: PHYSICAL THERAPY | Age: 82
End: 2022-08-29
Payer: MEDICARE

## 2022-08-31 ENCOUNTER — APPOINTMENT (OUTPATIENT)
Dept: PHYSICAL THERAPY | Age: 82
End: 2022-08-31
Payer: MEDICARE

## 2022-09-07 RX ORDER — SERTRALINE HYDROCHLORIDE 50 MG/1
TABLET, FILM COATED ORAL
Qty: 90 TABLET | Refills: 1 | Status: SHIPPED | OUTPATIENT
Start: 2022-09-07

## 2022-09-07 RX ORDER — AMLODIPINE BESYLATE 10 MG/1
TABLET ORAL
Qty: 90 TABLET | Refills: 1 | Status: SHIPPED | OUTPATIENT
Start: 2022-09-07

## 2022-09-07 RX ORDER — ATORVASTATIN CALCIUM 10 MG/1
TABLET, FILM COATED ORAL
Qty: 90 TABLET | Refills: 1 | Status: SHIPPED | OUTPATIENT
Start: 2022-09-07

## 2022-09-07 RX ORDER — LOSARTAN POTASSIUM AND HYDROCHLOROTHIAZIDE 12.5; 5 MG/1; MG/1
TABLET ORAL
Qty: 90 TABLET | Refills: 1 | Status: SHIPPED | OUTPATIENT
Start: 2022-09-07

## 2022-09-19 ENCOUNTER — HOSPITAL ENCOUNTER (OUTPATIENT)
Dept: MAMMOGRAPHY | Age: 82
Discharge: HOME | End: 2022-09-19
Attending: INTERNAL MEDICINE
Payer: MEDICARE

## 2022-09-19 DIAGNOSIS — Z12.31 VISIT FOR SCREENING MAMMOGRAM: ICD-10-CM

## 2022-09-19 PROCEDURE — 77063 BREAST TOMOSYNTHESIS BI: CPT

## 2022-09-22 PROBLEM — Z11.59 NEED FOR HEPATITIS C SCREENING TEST: Status: RESOLVED | Noted: 2022-05-29 | Resolved: 2022-09-22

## 2022-09-28 ENCOUNTER — OFFICE VISIT (OUTPATIENT)
Dept: INTERNAL MEDICINE CLINIC | Age: 82
End: 2022-09-28
Payer: MEDICARE

## 2022-09-28 VITALS
OXYGEN SATURATION: 95 % | HEIGHT: 65 IN | WEIGHT: 130 LBS | HEART RATE: 60 BPM | BODY MASS INDEX: 21.66 KG/M2 | RESPIRATION RATE: 17 BRPM | TEMPERATURE: 95.7 F | SYSTOLIC BLOOD PRESSURE: 130 MMHG | DIASTOLIC BLOOD PRESSURE: 70 MMHG

## 2022-09-28 DIAGNOSIS — Z86.59 HISTORY OF PANIC ATTACKS: ICD-10-CM

## 2022-09-28 DIAGNOSIS — I10 ESSENTIAL HYPERTENSION: ICD-10-CM

## 2022-09-28 DIAGNOSIS — F03.90 DEMENTIA WITHOUT BEHAVIORAL DISTURBANCE, UNSPECIFIED DEMENTIA TYPE: ICD-10-CM

## 2022-09-28 DIAGNOSIS — R63.4 WEIGHT LOSS: ICD-10-CM

## 2022-09-28 DIAGNOSIS — F41.8 SITUATIONAL ANXIETY: ICD-10-CM

## 2022-09-28 DIAGNOSIS — R41.3 MEMORY IMPAIRMENT: ICD-10-CM

## 2022-09-28 DIAGNOSIS — N18.31 STAGE 3A CHRONIC KIDNEY DISEASE (HCC): ICD-10-CM

## 2022-09-28 DIAGNOSIS — E78.00 PURE HYPERCHOLESTEROLEMIA: ICD-10-CM

## 2022-09-28 DIAGNOSIS — M85.88 OSTEOPENIA OF LUMBAR SPINE: ICD-10-CM

## 2022-09-28 DIAGNOSIS — M54.16 LUMBAR RADICULOPATHY: ICD-10-CM

## 2022-09-28 PROCEDURE — G8752 SYS BP LESS 140: HCPCS | Performed by: INTERNAL MEDICINE

## 2022-09-28 PROCEDURE — G9717 DOC PT DX DEP/BP F/U NT REQ: HCPCS | Performed by: INTERNAL MEDICINE

## 2022-09-28 PROCEDURE — 1101F PT FALLS ASSESS-DOCD LE1/YR: CPT | Performed by: INTERNAL MEDICINE

## 2022-09-28 PROCEDURE — G8399 PT W/DXA RESULTS DOCUMENT: HCPCS | Performed by: INTERNAL MEDICINE

## 2022-09-28 PROCEDURE — G8536 NO DOC ELDER MAL SCRN: HCPCS | Performed by: INTERNAL MEDICINE

## 2022-09-28 PROCEDURE — 99214 OFFICE O/P EST MOD 30 MIN: CPT | Performed by: INTERNAL MEDICINE

## 2022-09-28 PROCEDURE — 1090F PRES/ABSN URINE INCON ASSESS: CPT | Performed by: INTERNAL MEDICINE

## 2022-09-28 PROCEDURE — G8754 DIAS BP LESS 90: HCPCS | Performed by: INTERNAL MEDICINE

## 2022-09-28 PROCEDURE — G8420 CALC BMI NORM PARAMETERS: HCPCS | Performed by: INTERNAL MEDICINE

## 2022-09-28 PROCEDURE — G8427 DOCREV CUR MEDS BY ELIG CLIN: HCPCS | Performed by: INTERNAL MEDICINE

## 2022-09-28 RX ORDER — ALENDRONATE SODIUM 35 MG/1
35 TABLET ORAL
Qty: 12 TABLET | Refills: 1 | Status: SHIPPED | OUTPATIENT
Start: 2022-09-28

## 2022-09-28 RX ORDER — ALENDRONATE SODIUM 35 MG/1
35 TABLET ORAL
COMMUNITY
End: 2022-09-28 | Stop reason: SDUPTHER

## 2022-09-28 NOTE — PROGRESS NOTES
Steph Stone (: 1940) is a [de-identified] y.o. female, established patient, here for evaluation of the following chief complaint(s):  Follow Up Chronic Condition         ASSESSMENT/PLAN:  Below is the assessment and plan developed based on review of pertinent history, physical exam, labs, studies, and medications. 1. Essential hypertension  -     CBC WITH AUTOMATED DIFF  -     METABOLIC PANEL, COMPREHENSIVE  2. Pure hypercholesterolemia  -     LIPID PANEL  3. Situational anxiety  4. Dementia without behavioral disturbance, unspecified dementia type ([facility] [address]  5. Weight loss  6. Osteopenia of lumbar spine  7. Stage 3a chronic kidney disease ([facility] [address]  8. Memory impairment  9. Lumbar radiculopathy  10. History of panic attacks    Hypertension. Controlled. Continue current medication regimens including amlodipine 10 mg once a day. Losartan hydrochlorothiazide 50/12.5 mg once a day, metoprolol ER 50 mg once a day. Hypercholesteremia she cannot take higher dose of pravastatin continued on atorvastatin 10 mg at bedtime diet low in saturated fat greasy food fried food. Dementia. She has consulted neuro neurologist she has been started on Aricept 10 mg once a day. I am observing her weight according to her . She does not have great appetite but she does not have nausea vomiting but she takes food on the plate but does not eat much and having gradual weight loss. Clinically looks like being on Aricept getting tired she can discuss with Dr. Jae Smith regarding lowering the dose of Aricept 5 mg/day but her memory is not worsening so I am not taking decision whether to decrease the dose of Aricept or not patient's  Mr. Franc Simpson will call Dr. Smith Covert.    Situational anxiety getting sertraline 50 mg once a day. CKD stage III AA she is not on any NSAIDs today hydrated and avoid dehydration. Currently asymptomatic for lumbar radiculopathy and back pain. She finished physical therapy.     Osteopenia lumbar spine getting alendronate 35 mg once a week with vitamin D3 calcium. Muscle strengthening exercise at least twice a week and walking as much as she can do. She is normal on hydroxyzine no more anxiety or panic spells. Discussed about age-appropriate preventive vaccinations including taking Tdap or Td vaccine and Shingrix vaccine and booster dose of COVID and flu vaccine she refused to have Medicare wellness visit. Lab results reviewed and discussed with her. It was done in June. Refills given. Follow-up in 4 months. Return in about 4 months (around 1/28/2023) for follow up for chronic condition. SUBJECTIVE/OBJECTIVE:    HPI:  Ms. Macho Junior came for regular follow-up. She is pleasant personality and her  is with her for that I had recommended. Patient is facing memory problems and I had referred her to neurologist Dr. Lesele Li who has started her on Aricept/donepezil 10 mg/day. On examination she is weight loss from April until now about 7 pound weight loss and according to her  she does not have great appetite and she feels fatigue and tired one of the possibility may be being on donepezil and also her age and multiple medical problems. She stopped taking hydroxyzine since long. She cannot take higher dose of statin. She is very compliant for medicines. Her  brought the list of medicine. She has been taking alendronate 35 mg once a week. Her blood pressure is controlled. No negative thoughts. Very seldom she remains sad. She is well dressed. She has taken booster dose of COVID but awaiting for the new booster dose/by Valent vaccine. Review of Systems   Constitutional: Negative. Weight loss. noted by me. HENT:  Negative for sinus pain and sore throat. Eyes:  Negative for blurred vision. Respiratory:  Negative for cough, shortness of breath and wheezing. Cardiovascular: Negative. Gastrointestinal: Negative.     Genitourinary: Negative. Musculoskeletal:  Negative for back pain, joint pain and neck pain. Neurological:  Negative for dizziness, tingling and headaches. Psychiatric/Behavioral:  Positive for memory loss. Negative for hallucinations, substance abuse and suicidal ideas. The patient is nervous/anxious. The patient does not have insomnia. Stable on medication. Vitals:    09/28/22 1333 09/28/22 1402   BP: 128/69 130/70   Pulse: (!) 55 60   Resp: 17    Temp: (!) 95.7 °F (35.4 °C)    TempSrc: Temporal    SpO2: 95%    Weight: 130 lb (59 kg)    Height: 5' 5\" (1.651 m)       Physical Exam  Vitals and nursing note reviewed. Constitutional:       General: She is not in acute distress. Appearance: Normal appearance. She is normal weight. She is not toxic-appearing. Eyes:      Conjunctiva/sclera: Conjunctivae normal.      Pupils: Pupils are equal, round, and reactive to light. Cardiovascular:      Rate and Rhythm: Normal rate and regular rhythm. Pulses: Normal pulses. Heart sounds: Normal heart sounds. No murmur heard. Pulmonary:      Effort: Pulmonary effort is normal.      Breath sounds: Normal breath sounds. No wheezing or rhonchi. Abdominal:      General: Abdomen is flat. Bowel sounds are normal.      Palpations: Abdomen is soft. Tenderness: There is no abdominal tenderness. There is no guarding. Musculoskeletal:         General: No tenderness or deformity. Normal range of motion. Cervical back: Neck supple. Right lower leg: No edema. Left lower leg: No edema. Neurological:      General: No focal deficit present. Mental Status: She is alert and oriented to person, place, and time. Mental status is at baseline. Motor: No weakness.       Gait: Gait normal.   Psychiatric:         Mood and Affect: Mood normal.         Behavior: Behavior normal.       On this date 09/28/2022 I have spent 35 minutes reviewing previous notes, test results and face to face with the patient discussing the diagnosis and importance of compliance with the treatment plan as well as documenting on the day of the visit.     Signed by:  Lashae Tejeda MD

## 2022-09-28 NOTE — PROGRESS NOTES
1. \"Have you been to the ER, urgent care clinic since your last visit? Hospitalized since your last visit? \" No    2. \"Have you seen or consulted any other health care providers outside of the [address] [place] [address] since your last visit? \" No     3. For patients aged 39-70: Has the patient had a colonoscopy / FIT/ Cologuard? NA - based on age      If the patient is female:    4. For patients aged 41-77: Has the patient had a mammogram within the past 2 years? NA - based on age or sex      11. For patients aged 21-65: Has the patient had a pap smear?  NA - based on age or sex      Chief Complaint   Patient presents with    Follow Up Chronic Condition        Visit Vitals  /69 (BP 1 Location: Right upper arm, BP Patient Position: Sitting, BP Cuff Size: Adult)   Pulse (!) 55   Temp (!) 95.7 °F (35.4 °C) (Temporal)   Resp 17   Ht 5' 5\" (1.651 m)   Wt 130 lb (59 kg)   SpO2 95%   BMI 21.63 kg/m²

## 2022-10-25 LAB
ALBUMIN SERPL-MCNC: 4.6 G/DL (ref 3.7–4.7)
ALBUMIN/GLOB SERPL: 1.8 {RATIO} (ref 1.2–2.2)
ALP SERPL-CCNC: 60 IU/L (ref 44–121)
ALT SERPL-CCNC: 20 IU/L (ref 0–32)
AST SERPL-CCNC: 23 IU/L (ref 0–40)
BASOPHILS # BLD AUTO: 0.1 X10E3/UL (ref 0–0.2)
BASOPHILS NFR BLD AUTO: 1 %
BILIRUB SERPL-MCNC: 0.3 MG/DL (ref 0–1.2)
BUN SERPL-MCNC: 17 MG/DL (ref 8–27)
BUN/CREAT SERPL: 15 (ref 12–28)
CALCIUM SERPL-MCNC: 10.2 MG/DL (ref 8.7–10.3)
CHLORIDE SERPL-SCNC: 96 MMOL/L (ref 96–106)
CHOLEST SERPL-MCNC: 181 MG/DL (ref 100–199)
CO2 SERPL-SCNC: 28 MMOL/L (ref 20–29)
CREAT SERPL-MCNC: 1.12 MG/DL (ref 0.57–1)
EGFR: 50 ML/MIN/1.73
EOSINOPHIL # BLD AUTO: 0.2 X10E3/UL (ref 0–0.4)
EOSINOPHIL NFR BLD AUTO: 2 %
ERYTHROCYTE [DISTWIDTH] IN BLOOD BY AUTOMATED COUNT: 14.3 % (ref 11.7–15.4)
GLOBULIN SER CALC-MCNC: 2.5 G/DL (ref 1.5–4.5)
GLUCOSE SERPL-MCNC: 91 MG/DL (ref 70–99)
HCT VFR BLD AUTO: 36.4 % (ref 34–46.6)
HDLC SERPL-MCNC: 73 MG/DL
HGB BLD-MCNC: 11.9 G/DL (ref 11.1–15.9)
IMM GRANULOCYTES # BLD AUTO: 0 X10E3/UL (ref 0–0.1)
IMM GRANULOCYTES NFR BLD AUTO: 0 %
LDLC SERPL CALC-MCNC: 93 MG/DL (ref 0–99)
LYMPHOCYTES # BLD AUTO: 1.8 X10E3/UL (ref 0.7–3.1)
LYMPHOCYTES NFR BLD AUTO: 24 %
MCH RBC QN AUTO: 27 PG (ref 26.6–33)
MCHC RBC AUTO-ENTMCNC: 32.7 G/DL (ref 31.5–35.7)
MCV RBC AUTO: 83 FL (ref 79–97)
MONOCYTES # BLD AUTO: 0.5 X10E3/UL (ref 0.1–0.9)
MONOCYTES NFR BLD AUTO: 7 %
NEUTROPHILS # BLD AUTO: 4.8 X10E3/UL (ref 1.4–7)
NEUTROPHILS NFR BLD AUTO: 66 %
PLATELET # BLD AUTO: 254 X10E3/UL (ref 150–450)
POTASSIUM SERPL-SCNC: 3.8 MMOL/L (ref 3.5–5.2)
PROT SERPL-MCNC: 7.1 G/DL (ref 6–8.5)
RBC # BLD AUTO: 4.41 X10E6/UL (ref 3.77–5.28)
SODIUM SERPL-SCNC: 139 MMOL/L (ref 134–144)
TRIGL SERPL-MCNC: 84 MG/DL (ref 0–149)
VLDLC SERPL CALC-MCNC: 15 MG/DL (ref 5–40)
WBC # BLD AUTO: 7.3 X10E3/UL (ref 3.4–10.8)

## 2022-10-25 NOTE — PROGRESS NOTES
Please call Ms. Trell Foster or her  because she has slight forgetfulness so her  takes care of her    Complete blood count including white cell count, hemoglobin, platelet count normal    CMP including fasting blood sugar electrolytes including potassium 3.8 and liver enzymes normal    Kidney function is same compared to 4 months ago    Cholesterol reading is absolutely normal proud for her continue the same dose of medication she is doing very good at her age of [de-identified].

## 2022-11-17 NOTE — PROGRESS NOTES
[address] [facility]  Ph: [phone]  Fax: [phone]    Discharge Summary 2-15    Patient name: Evangelina Hernández  : 1940  Provider#: [id number]  Referral source: Cintia Johnson MD      Medical/Treatment Diagnosis: Other low back pain [M54.59]  Radiculopathy, lumbar region [M54.16]  Pain in right leg [M79.604]  Pain in left leg [M79.605]     Prior Hospitalization: see medical history     Comorbidities: See Plan of Care  Prior Level of Function: See Plan of Care  Medications: Verified on Patient Summary List  Start of Care: 22   Onset Date:(see initial plan of care)  Visits from Start of Care: 12  Missed Visits: 0  Certification Period : 22 to 10/6/22    Assessment/Summary of care/GOALS:   Pt has completed 12 of 12 visits, pt's POC was extended but the pt did not return for further follow up visits. Objective findings from last reassessment below. Overall pt reported good progress with subjective report of 80% improvement since starting therapy. Chief complaint at this time is leg pain in the mornings and occasionally in the evening. Will discharge at this time as pt has not pursued continued treatment. Thank you for this referral.         GOALS/Progress towards goals:  Patient Goal (s): relief from leg pain    [] Met [] Not met [] Partially met  Short Term Goals: To be accomplished in 6 treatments. 1. Patient will report compliance to a progressive HEP. [x] Met [] Not met [] Partially met 22  Long Term Goals: To be accomplished in 12 treatments. 1. Patient will report decrease in intensity of thigh pain to 1-2/10 or less each morning. [] Met [x] Not met [] Partially met  2. Patient will report decrease in frequency of thigh pain by 50%. [x] Met [] Not met [] Partially met  3.  Patient will perform SLS bilaterally x10 seconds to improve stability [] Met [x] Not met [] Partially met      Reassessed on 8/2/22  Spine:  TRUNK ROM:      Flexion:                                  [] N/A  [x]WNL  []Minimal  []Moderate  [] Major  Extension:                             [] N/A  [x]WNL  []Minimal  []Moderate  [] Major    Right Lateral Flexion:           [] N/A  [x]WNL  []Minimal  []Moderate  [] Major    Left Lateral Flexion:              [] N/A  [x]WNL  []Minimal  []Moderate  [] Major  Rotation to the Right:           [] N/A  [x]WNL  []Minimal  []Moderate  [] Major  Rotation to the Left:              [] N/A  [x]WNL  []Minimal  []Moderate  [] Major  Right Side Glide:                   [] N/A  []WNL  []Minimal  []Moderate  [] Major  Left Side Glide:                     [] N/A  []WNL  []Minimal  []Moderate  [] Major  Additional comments:      Specific joints:     Hip                                AROM                            PROM                              MMT    R L R L R L   Flexion (120) WNL WNL WNL WNL 5 5   Extension (15)         5 5   Abduction (40)         5 5   Adduction (30)               IR (40)               ER (40)               Additional comments:     Hamstring : R -20 deg,  L -30 deg        Knee                                 AROM                          PROM                           MMT    R L R L R L   Extension (0)          5 5   Flexion (145)         5 5   Additional comments: -     Ankle                                 AROM                       PROM                             MMT    R L R L R L   Dorsiflexion (15)         5 5   Plantarflexion (50)          5  5   Additional comments: -     SLS: R 14 sec, L 4 sec                                                 Ampac Score:  6.88% (same as initial assessment)    RECOMMENDATIONS:  [x]Discontinue therapy:   [x]Patient has reached or is progressing toward set goals and is independent with HEP   [x]Patient is non-compliant or has abdicated   []Due to lack of appreciable progress towards set goals   []Patient was hospitalized or suffered illness that impacted ability to continue therapy   []Other:     Yana Schneider, PT, DPT 11/17/2022

## 2022-12-19 ENCOUNTER — TELEPHONE (OUTPATIENT)
Dept: INTERNAL MEDICINE CLINIC | Age: 82
End: 2022-12-19

## 2022-12-23 RX ORDER — SERTRALINE HYDROCHLORIDE 50 MG/1
50 TABLET, FILM COATED ORAL DAILY
Qty: 90 TABLET | Refills: 0 | Status: SHIPPED | OUTPATIENT
Start: 2022-12-23

## 2023-01-26 RX ORDER — ATORVASTATIN CALCIUM 10 MG/1
10 TABLET, FILM COATED ORAL
Qty: 90 TABLET | Refills: 1 | Status: SHIPPED | OUTPATIENT
Start: 2023-01-26

## 2023-01-26 RX ORDER — LOSARTAN POTASSIUM AND HYDROCHLOROTHIAZIDE 12.5; 5 MG/1; MG/1
1 TABLET ORAL EVERY MORNING
Qty: 90 TABLET | Refills: 1 | Status: SHIPPED | OUTPATIENT
Start: 2023-01-26

## 2023-01-26 RX ORDER — AMLODIPINE BESYLATE 10 MG/1
10 TABLET ORAL DAILY
Qty: 90 TABLET | Refills: 1 | Status: SHIPPED | OUTPATIENT
Start: 2023-01-26

## 2023-01-26 RX ORDER — LOSARTAN POTASSIUM AND HYDROCHLOROTHIAZIDE 12.5; 5 MG/1; MG/1
1 TABLET ORAL DAILY
Qty: 90 TABLET | Refills: 1 | OUTPATIENT
Start: 2023-01-26

## 2023-01-26 NOTE — TELEPHONE ENCOUNTER
Patient's  came in stated he called request into [facility] - explained we have not received any request but I will put it in now.     amLODIPine (NORVASC) 10 mg tablet  Sig: TAKE 1 TABLET BY MOUTH EVERY DAY    atorvastatin (LIPITOR) 10 mg tablet  Sig: TAKE 1 TABLET BY MOUTH EVERY DAY AT NIGHT    losartan-hydroCHLOROthiazide (HYZAAR) 50-12.5 mg per tablet  Sig: TAKE 1 TABLET BY MOUTH EVERY DAY

## 2023-02-04 PROBLEM — F03.90 DEMENTIA WITHOUT BEHAVIORAL DISTURBANCE (HCC): Status: ACTIVE | Noted: 2022-09-28

## 2023-02-07 ENCOUNTER — OFFICE VISIT (OUTPATIENT)
Dept: INTERNAL MEDICINE CLINIC | Age: 83
End: 2023-02-07
Payer: MEDICARE

## 2023-02-07 VITALS
HEART RATE: 60 BPM | WEIGHT: 134.8 LBS | BODY MASS INDEX: 21.66 KG/M2 | OXYGEN SATURATION: 99 % | TEMPERATURE: 98 F | RESPIRATION RATE: 20 BRPM | HEIGHT: 66 IN | SYSTOLIC BLOOD PRESSURE: 128 MMHG | DIASTOLIC BLOOD PRESSURE: 80 MMHG

## 2023-02-07 DIAGNOSIS — I10 ESSENTIAL HYPERTENSION: Primary | ICD-10-CM

## 2023-02-07 DIAGNOSIS — F41.8 SITUATIONAL ANXIETY: ICD-10-CM

## 2023-02-07 DIAGNOSIS — I10 WHITE COAT SYNDROME WITH HYPERTENSION: ICD-10-CM

## 2023-02-07 DIAGNOSIS — E78.00 PURE HYPERCHOLESTEROLEMIA: ICD-10-CM

## 2023-02-07 DIAGNOSIS — F43.21 SITUATIONAL DEPRESSION: ICD-10-CM

## 2023-02-07 DIAGNOSIS — M85.88 OSTEOPENIA OF LUMBAR SPINE: ICD-10-CM

## 2023-02-07 DIAGNOSIS — F03.90 DEMENTIA WITHOUT BEHAVIORAL DISTURBANCE (HCC): ICD-10-CM

## 2023-02-07 DIAGNOSIS — M19.90 OSTEOARTHRITIS, UNSPECIFIED OSTEOARTHRITIS TYPE, UNSPECIFIED SITE: ICD-10-CM

## 2023-02-07 DIAGNOSIS — Z86.59 HISTORY OF PANIC ATTACKS: ICD-10-CM

## 2023-02-07 DIAGNOSIS — N18.31 STAGE 3A CHRONIC KIDNEY DISEASE (HCC): ICD-10-CM

## 2023-02-07 PROBLEM — M25.511 PAIN OF BOTH SHOULDER JOINTS: Status: ACTIVE | Noted: 2023-02-07

## 2023-02-07 PROBLEM — M25.512 PAIN OF BOTH SHOULDER JOINTS: Status: ACTIVE | Noted: 2023-02-07

## 2023-02-07 PROCEDURE — G8420 CALC BMI NORM PARAMETERS: HCPCS | Performed by: INTERNAL MEDICINE

## 2023-02-07 PROCEDURE — 1090F PRES/ABSN URINE INCON ASSESS: CPT | Performed by: INTERNAL MEDICINE

## 2023-02-07 PROCEDURE — G9717 DOC PT DX DEP/BP F/U NT REQ: HCPCS | Performed by: INTERNAL MEDICINE

## 2023-02-07 PROCEDURE — G8427 DOCREV CUR MEDS BY ELIG CLIN: HCPCS | Performed by: INTERNAL MEDICINE

## 2023-02-07 PROCEDURE — 1101F PT FALLS ASSESS-DOCD LE1/YR: CPT | Performed by: INTERNAL MEDICINE

## 2023-02-07 PROCEDURE — 99214 OFFICE O/P EST MOD 30 MIN: CPT | Performed by: INTERNAL MEDICINE

## 2023-02-07 PROCEDURE — G8536 NO DOC ELDER MAL SCRN: HCPCS | Performed by: INTERNAL MEDICINE

## 2023-02-07 PROCEDURE — G8399 PT W/DXA RESULTS DOCUMENT: HCPCS | Performed by: INTERNAL MEDICINE

## 2023-02-07 RX ORDER — POLYETHYLENE GLYCOL 3350 17 G/17G
17 POWDER, FOR SOLUTION ORAL DAILY
Qty: 595 G | Refills: 2 | Status: SHIPPED | OUTPATIENT
Start: 2023-02-07

## 2023-02-07 RX ORDER — SERTRALINE HYDROCHLORIDE 25 MG/1
25 TABLET, FILM COATED ORAL DAILY
Qty: 90 TABLET | Refills: 1 | Status: SHIPPED | OUTPATIENT
Start: 2023-02-07

## 2023-02-07 NOTE — PROGRESS NOTES
Sameera White (: 1940) is a [de-identified] y.o. female, established patient, here for evaluation of the following chief complaint(s):  Follow Up Chronic Condition, Hypertension, Dementia, Cholesterol Problem, and Chronic Kidney Disease         ASSESSMENT/PLAN:  Below is the assessment and plan developed based on review of pertinent history, physical exam, labs, studies, and medications. 1. Essential hypertension  -     CBC WITH AUTOMATED DIFF  -     METABOLIC PANEL, COMPREHENSIVE  -     TSH RFX ON ABNORMAL TO FREE T4  2. Stage 3a chronic kidney disease (HCC)  -     METABOLIC PANEL, COMPREHENSIVE  3. Pure hypercholesterolemia  -     LIPID PANEL  4. Situational anxiety  5. Dementia without behavioral disturbance ([address]  6. Osteopenia of lumbar spine  7. History of panic attacks  8. Situational depression  -     TSH RFX ON ABNORMAL TO FREE T4  9. Osteoarthritis, unspecified osteoarthritis type, unspecified site  10. White coat syndrome with hypertension    Hypertension with anxious personality/whitecoat syndrome with hypertension after relaxing her blood pressure is controlled. She is reassured. According to her  she keeps checking her blood pressure very frequently recommended to be mindful. Continue current dose without making changes. She can check her blood pressure if she has any abnormal symptoms like headache dizziness blurry vision or chest pain or shortness of breath or sweating    Continued amlodipine 10 mg once a day. Continue losartan hydrochlorothiazide 50/12.5 mg once a day morning. Continue metoprolol ER 50 mg once a day. Hypercholesteremia.   Her CK level was normal in the past continue atorvastatin 10 mg at bedtime strongly recommended to stop eating ice cream on a daily basis which is one of the etiology for elevating cholesterol and to avoid increasing the dose of statin at her age and to prevent cardiovascular and cerebrovascular risk and her  agreed and educational brochures given. Repeat labs ordered. Sometimes excessive sleepy sertraline decreased to 25 mg/day. Now no sadness but she has nervous personality I have told her not to take hydroxyzine. Constipation so recommended to start MiraLAX. And OTC align. Hypokalemia continue potassium 10 mill equivalent once a day. She can take Tylenol as needed for her arthritis. Osteopenia since she is complaining of some DJD I will hold her alendronate continue vitamin D3 calcium. No negative thoughts. Memory impaired and she had been consulted by neurologist who recommended to take donepezil and she has stopped due to side effects. Her memory is not worsening and her  is a great support to her and she does not drive. Psychologic reassurance given that at her age of [de-identified] she is doing excellent except not able to drive and getting nervous. Refills given. Labs ordered. Vaccinations reviewed. Return in about 3 months (around 5/7/2023) for [address] up for chronic condition, follow up for chronic condition. SUBJECTIVE/OBJECTIVE:  HPI    Mayme Jackie with very pleasant personality with anxiety/whitecoat hypertension is brought by her  who is also having very pleasant personality. Her blood pressure is well controlled after resting and relaxing. Initially it was high. Reassured. She has stopped taking donepezil started by Dr. Robert Taylor for her memories. According to her  her memory is not worsening but she had side effects so she stopped. She takes low-dose of atorvastatin. She likes to eat ice cream on a daily basis which may be one of the etiology for elevated cholesterol. She takes potassium supplements. She has sometimes joint pain recommended to hold her alendronate having osteopenia she takes calcium vitamin D3.     According to Mr. Ryan Vazquez history she does not have depression but she gets easily nervous and anxious and she has 14 grandchildren and 4 great-grandchildren and 3 children who are grown up. They visit them frequently and otherwise she is independent for ADL/IADL except driving. Her creatinine was elevated. In the past she had situational sadness due to aging but now she does not have. No history of fall. According to Mr. Jo-Ann Peck she sleeps excessively so sertraline decreased to 25 mg/day. Now she is off from hydroxyzine. Her blood pressure is well controlled on current medication regimen. She is due for her labs that they will do she is fasting. Medicine reconciliation done.,  Explained to her  in details about the updates in the medications and the doses. Allergies   Allergen Reactions    Metronidazole Itching     Current Outpatient Medications   Medication Sig    polyethylene glycol (MIRALAX) 17 gram/dose powder Take 17 g by mouth daily. sertraline (ZOLOFT) 25 mg tablet Take 1 Tablet by mouth daily. amLODIPine (NORVASC) 10 mg tablet Take 1 Tablet by mouth daily. atorvastatin (LIPITOR) 10 mg tablet Take 1 Tablet by mouth nightly. losartan-hydroCHLOROthiazide (HYZAAR) 50-12.5 mg per tablet Take 1 Tablet by mouth Every morning. metoprolol succinate (TOPROL-XL) 50 mg XL tablet TAKE 1 TABLET BY MOUTH EVERY DAY    Klor-Con M10 10 mEq tablet TAKE 1 TABLET BY MOUTH EVERY DAY    cholecalciferol, vitamin D3, (VITAMIN D3 PO) Take  by mouth. acetaminophen (TYLENOL) 500 mg tablet Take 1 Tablet by mouth two (2) times daily as needed for Pain. aspirin delayed-release 81 mg tablet Take 1 Tab by mouth daily. multivit-min/iron/folic/lutein (CENTRUM SILVER WOMEN PO) Take 1 Tab by mouth daily. No current facility-administered medications for this visit.      Past Medical History:   Diagnosis Date    Anxiety     Asthma     Hypercholesterolemia     Hypertension     Osteoarthritis, unspecified osteoarthritis type, unspecified site 2/7/2023    Osteoporosis     Situational depression 9/22/2020 Past Surgical History:   Procedure Laterality Date    HX COLONOSCOPY  11/10/2015    HX HYSTERECTOMY      HX THYROIDECTOMY       Family History   Problem Relation Age of Onset    Alzheimer's Disease Mother     Dementia Mother     Heart Disease Maternal Grandmother          Review of Systems   Constitutional: Negative. HENT:  Negative for ear discharge, sinus pain and sore throat. Eyes:  Negative for blurred vision. Respiratory:  Negative for cough, sputum production, shortness of breath and wheezing. Cardiovascular: Negative. Gastrointestinal: Negative. Genitourinary: Negative. Musculoskeletal:  Negative for falls, joint pain and myalgias. Neurological:  Negative for dizziness, tingling, tremors and headaches. Psychiatric/Behavioral:  The patient is nervous/anxious. Vitals:    02/07/23 1418 02/07/23 1429 02/07/23 1445   BP: (!) 155/78 134/75 128/80   Pulse: (!) 54  60   Resp: 20     Temp: 98 °F (36.7 °C)     TempSrc: Oral     SpO2: 99%     Weight: 134 lb 12.8 oz (61.1 kg)     Height: 5' 6\" (1.676 m)            Physical Exam  Constitutional:       General: She is not in acute distress. Appearance: Normal appearance. She is not toxic-appearing. Eyes:      Pupils: Pupils are equal, round, and reactive to light. Cardiovascular:      Rate and Rhythm: Normal rate and regular rhythm. Pulses: Normal pulses. Heart sounds: Normal heart sounds. No murmur heard. Pulmonary:      Effort: Pulmonary effort is normal. No respiratory distress. Breath sounds: Normal breath sounds. No wheezing. Abdominal:      General: Bowel sounds are normal. There is no distension. Palpations: Abdomen is soft. Tenderness: There is no abdominal tenderness. Musculoskeletal:         General: No swelling or tenderness. Cervical back: Neck supple. Right lower leg: No edema. Left lower leg: No edema. Skin:     General: Skin is warm.    Neurological:      General: No focal deficit present. Mental Status: She is alert and oriented to person, place, and time. Mental status is at baseline. Cranial Nerves: No cranial nerve deficit. Motor: No weakness. Gait: Gait normal.   Psychiatric:         Mood and Affect: Mood normal.         Behavior: Behavior normal.       On this date 02/07/2023 I have spent 35 minutes reviewing previous notes, test results and face to face with the patient discussing the diagnosis and importance of compliance with the treatment plan as well as documenting on the day of the visit. An electronic signature was used to authenticate this note.   -- Edwina Raza MD

## 2023-02-08 LAB
ALBUMIN SERPL-MCNC: 4.6 G/DL (ref 3.7–4.7)
ALBUMIN/GLOB SERPL: 1.6 {RATIO} (ref 1.2–2.2)
ALP SERPL-CCNC: 67 IU/L (ref 44–121)
ALT SERPL-CCNC: 15 IU/L (ref 0–32)
AST SERPL-CCNC: 20 IU/L (ref 0–40)
BASOPHILS # BLD AUTO: 0.1 X10E3/UL (ref 0–0.2)
BASOPHILS NFR BLD AUTO: 1 %
BILIRUB SERPL-MCNC: 0.3 MG/DL (ref 0–1.2)
BUN SERPL-MCNC: 14 MG/DL (ref 8–27)
BUN/CREAT SERPL: 12 (ref 12–28)
CALCIUM SERPL-MCNC: 10 MG/DL (ref 8.7–10.3)
CHLORIDE SERPL-SCNC: 96 MMOL/L (ref 96–106)
CHOLEST SERPL-MCNC: 277 MG/DL (ref 100–199)
CO2 SERPL-SCNC: 27 MMOL/L (ref 20–29)
CREAT SERPL-MCNC: 1.18 MG/DL (ref 0.57–1)
EGFRCR SERPLBLD CKD-EPI 2021: 47 ML/MIN/1.73
EOSINOPHIL # BLD AUTO: 0.1 X10E3/UL (ref 0–0.4)
EOSINOPHIL NFR BLD AUTO: 1 %
ERYTHROCYTE [DISTWIDTH] IN BLOOD BY AUTOMATED COUNT: 13.7 % (ref 11.7–15.4)
GLOBULIN SER CALC-MCNC: 2.8 G/DL (ref 1.5–4.5)
GLUCOSE SERPL-MCNC: 98 MG/DL (ref 70–99)
HCT VFR BLD AUTO: 38.5 % (ref 34–46.6)
HDLC SERPL-MCNC: 74 MG/DL
HGB BLD-MCNC: 12.7 G/DL (ref 11.1–15.9)
IMM GRANULOCYTES # BLD AUTO: 0 X10E3/UL (ref 0–0.1)
IMM GRANULOCYTES NFR BLD AUTO: 0 %
LDLC SERPL CALC-MCNC: 182 MG/DL (ref 0–99)
LYMPHOCYTES # BLD AUTO: 1.6 X10E3/UL (ref 0.7–3.1)
LYMPHOCYTES NFR BLD AUTO: 18 %
MCH RBC QN AUTO: 27 PG (ref 26.6–33)
MCHC RBC AUTO-ENTMCNC: 33 G/DL (ref 31.5–35.7)
MCV RBC AUTO: 82 FL (ref 79–97)
MONOCYTES # BLD AUTO: 0.5 X10E3/UL (ref 0.1–0.9)
MONOCYTES NFR BLD AUTO: 5 %
NEUTROPHILS # BLD AUTO: 6.7 X10E3/UL (ref 1.4–7)
NEUTROPHILS NFR BLD AUTO: 75 %
PLATELET # BLD AUTO: 292 X10E3/UL (ref 150–450)
POTASSIUM SERPL-SCNC: 3.5 MMOL/L (ref 3.5–5.2)
PROT SERPL-MCNC: 7.4 G/DL (ref 6–8.5)
RBC # BLD AUTO: 4.71 X10E6/UL (ref 3.77–5.28)
SODIUM SERPL-SCNC: 140 MMOL/L (ref 134–144)
TRIGL SERPL-MCNC: 118 MG/DL (ref 0–149)
TSH SERPL DL<=0.005 MIU/L-ACNC: 1.36 UIU/ML (ref 0.45–4.5)
VLDLC SERPL CALC-MCNC: 21 MG/DL (ref 5–40)
WBC # BLD AUTO: 8.9 X10E3/UL (ref 3.4–10.8)

## 2023-02-08 RX ORDER — ATORVASTATIN CALCIUM 10 MG/1
10 TABLET, FILM COATED ORAL
Qty: 90 TABLET | Refills: 1 | Status: SHIPPED | OUTPATIENT
Start: 2023-02-08

## 2023-02-08 RX ORDER — POTASSIUM CHLORIDE 750 MG/1
10 TABLET, EXTENDED RELEASE ORAL 2 TIMES DAILY
Qty: 180 TABLET | Refills: 1 | Status: SHIPPED | OUTPATIENT
Start: 2023-02-08

## 2023-02-09 NOTE — PROGRESS NOTES
Please call Ms. Tyler Gutierrez  Mr. Tyler Gutierrez    It is better to call Mr. Tyler Gutierrez because patient is having anxious personality and slight memory problems    Please inform him that her white cell count hemoglobin platelets normal    Fasting blood sugar normal    Kidney function is same as 3 months ago and about 2 years ago slightly low but the same and do not take or avoid taking OTC ibuprofen Aleve Motrin Advil but she can take Tylenol for pain Tylenol Extra Strength can be taken for any pain    I do not know it seems like she is noncompliant for potassium she needs to have potassium 10 mill equivalent twice a day    She loves to eat ice cream daily which is one of the reason for high cholesterol and I am not sure whether she is compliant for atorvastatin 20 mg at bedtime.   They brought the list of medication but did did not bring the medications bottles so I am not sure whether they are compliant 3 months ago cholesterol was normal again it is high which can increase the risk of heart disease stroke    Thyroid function normal.    For safety I gave 2 refills again today with increase frequency of potassium twice a day and atorvastatin once a day at bedtime and stopped eating ice cream.

## 2023-03-06 RX ORDER — POTASSIUM CHLORIDE 750 MG/1
TABLET, EXTENDED RELEASE ORAL
Qty: 90 TABLET | Refills: 1 | Status: SHIPPED | OUTPATIENT
Start: 2023-03-06

## 2023-05-09 ENCOUNTER — OFFICE VISIT (OUTPATIENT)
Facility: CLINIC | Age: 83
End: 2023-05-09
Payer: MEDICARE

## 2023-05-09 VITALS
SYSTOLIC BLOOD PRESSURE: 120 MMHG | WEIGHT: 126 LBS | HEIGHT: 65 IN | DIASTOLIC BLOOD PRESSURE: 70 MMHG | TEMPERATURE: 98 F | HEART RATE: 60 BPM | BODY MASS INDEX: 20.99 KG/M2 | RESPIRATION RATE: 18 BRPM | OXYGEN SATURATION: 98 %

## 2023-05-09 DIAGNOSIS — I10 ESSENTIAL (PRIMARY) HYPERTENSION: ICD-10-CM

## 2023-05-09 DIAGNOSIS — Z00.00 MEDICARE ANNUAL WELLNESS VISIT, SUBSEQUENT: ICD-10-CM

## 2023-05-09 DIAGNOSIS — Z86.59 HISTORY OF PANIC ATTACKS: ICD-10-CM

## 2023-05-09 DIAGNOSIS — F03.90 DEMENTIA WITHOUT BEHAVIORAL DISTURBANCE (HCC): ICD-10-CM

## 2023-05-09 DIAGNOSIS — E78.00 PURE HYPERCHOLESTEROLEMIA: ICD-10-CM

## 2023-05-09 DIAGNOSIS — M19.90 OSTEOARTHRITIS, UNSPECIFIED OSTEOARTHRITIS TYPE, UNSPECIFIED SITE: ICD-10-CM

## 2023-05-09 DIAGNOSIS — R63.4 UNINTENTIONAL WEIGHT LOSS: ICD-10-CM

## 2023-05-09 PROCEDURE — 1123F ACP DISCUSS/DSCN MKR DOCD: CPT | Performed by: INTERNAL MEDICINE

## 2023-05-09 PROCEDURE — G0439 PPPS, SUBSEQ VISIT: HCPCS | Performed by: INTERNAL MEDICINE

## 2023-05-09 PROCEDURE — 3074F SYST BP LT 130 MM HG: CPT | Performed by: INTERNAL MEDICINE

## 2023-05-09 PROCEDURE — 3078F DIAST BP <80 MM HG: CPT | Performed by: INTERNAL MEDICINE

## 2023-05-09 RX ORDER — MEMANTINE HYDROCHLORIDE 5 MG/1
5 TABLET ORAL 2 TIMES DAILY
COMMUNITY
Start: 2023-04-24

## 2023-05-09 RX ORDER — CALCIUM CARBONATE/VITAMIN D3 600 MG-10
TABLET ORAL
COMMUNITY

## 2023-05-09 SDOH — ECONOMIC STABILITY: HOUSING INSECURITY: IN THE LAST 12 MONTHS, HOW MANY PLACES HAVE YOU LIVED?: 1

## 2023-05-09 SDOH — ECONOMIC STABILITY: TRANSPORTATION INSECURITY
IN THE PAST 12 MONTHS, HAS THE LACK OF TRANSPORTATION KEPT YOU FROM MEDICAL APPOINTMENTS OR FROM GETTING MEDICATIONS?: NO

## 2023-05-09 SDOH — ECONOMIC STABILITY: HOUSING INSECURITY
IN THE LAST 12 MONTHS, WAS THERE A TIME WHEN YOU DID NOT HAVE A STEADY PLACE TO SLEEP OR SLEPT IN A SHELTER (INCLUDING NOW)?: NO

## 2023-05-09 SDOH — ECONOMIC STABILITY: INCOME INSECURITY: HOW HARD IS IT FOR YOU TO PAY FOR THE VERY BASICS LIKE FOOD, HOUSING, MEDICAL CARE, AND HEATING?: NOT HARD AT ALL

## 2023-05-09 SDOH — ECONOMIC STABILITY: INCOME INSECURITY: IN THE LAST 12 MONTHS, WAS THERE A TIME WHEN YOU WERE NOT ABLE TO PAY THE MORTGAGE OR RENT ON TIME?: NO

## 2023-05-09 SDOH — ECONOMIC STABILITY: TRANSPORTATION INSECURITY
IN THE PAST 12 MONTHS, HAS LACK OF TRANSPORTATION KEPT YOU FROM MEETINGS, WORK, OR FROM GETTING THINGS NEEDED FOR DAILY LIVING?: NO

## 2023-05-09 SDOH — ECONOMIC STABILITY: FOOD INSECURITY: WITHIN THE PAST 12 MONTHS, YOU WORRIED THAT YOUR FOOD WOULD RUN OUT BEFORE YOU GOT MONEY TO BUY MORE.: NEVER TRUE

## 2023-05-09 SDOH — ECONOMIC STABILITY: FOOD INSECURITY: WITHIN THE PAST 12 MONTHS, THE FOOD YOU BOUGHT JUST DIDN'T LAST AND YOU DIDN'T HAVE MONEY TO GET MORE.: NEVER TRUE

## 2023-05-09 ASSESSMENT — PATIENT HEALTH QUESTIONNAIRE - PHQ9
6. FEELING BAD ABOUT YOURSELF - OR THAT YOU ARE A FAILURE OR HAVE LET YOURSELF OR YOUR FAMILY DOWN: 1
5. POOR APPETITE OR OVEREATING: 0
10. IF YOU CHECKED OFF ANY PROBLEMS, HOW DIFFICULT HAVE THESE PROBLEMS MADE IT FOR YOU TO DO YOUR WORK, TAKE CARE OF THINGS AT HOME, OR GET ALONG WITH OTHER PEOPLE: 0
SUM OF ALL RESPONSES TO PHQ QUESTIONS 1-9: 11
8. MOVING OR SPEAKING SO SLOWLY THAT OTHER PEOPLE COULD HAVE NOTICED. OR THE OPPOSITE, BEING SO FIGETY OR RESTLESS THAT YOU HAVE BEEN MOVING AROUND A LOT MORE THAN USUAL: 2
2. FEELING DOWN, DEPRESSED OR HOPELESS: 1
SUM OF ALL RESPONSES TO PHQ QUESTIONS 1-9: 11
7. TROUBLE CONCENTRATING ON THINGS, SUCH AS READING THE NEWSPAPER OR WATCHING TELEVISION: 0
9. THOUGHTS THAT YOU WOULD BE BETTER OFF DEAD, OR OF HURTING YOURSELF: 0
3. TROUBLE FALLING OR STAYING ASLEEP: 3
SUM OF ALL RESPONSES TO PHQ QUESTIONS 1-9: 11
SUM OF ALL RESPONSES TO PHQ9 QUESTIONS 1 & 2: 2
1. LITTLE INTEREST OR PLEASURE IN DOING THINGS: 1
4. FEELING TIRED OR HAVING LITTLE ENERGY: 3
SUM OF ALL RESPONSES TO PHQ QUESTIONS 1-9: 11

## 2023-05-09 ASSESSMENT — ANXIETY QUESTIONNAIRES
4. TROUBLE RELAXING: 2
IF YOU CHECKED OFF ANY PROBLEMS ON THIS QUESTIONNAIRE, HOW DIFFICULT HAVE THESE PROBLEMS MADE IT FOR YOU TO DO YOUR WORK, TAKE CARE OF THINGS AT HOME, OR GET ALONG WITH OTHER PEOPLE: NOT DIFFICULT AT ALL
2. NOT BEING ABLE TO STOP OR CONTROL WORRYING: 2
6. BECOMING EASILY ANNOYED OR IRRITABLE: 2
1. FEELING NERVOUS, ANXIOUS, OR ON EDGE: 1
5. BEING SO RESTLESS THAT IT IS HARD TO SIT STILL: 2
7. FEELING AFRAID AS IF SOMETHING AWFUL MIGHT HAPPEN: 1
3. WORRYING TOO MUCH ABOUT DIFFERENT THINGS: 3
GAD7 TOTAL SCORE: 13

## 2023-05-09 ASSESSMENT — LIFESTYLE VARIABLES
HOW MANY STANDARD DRINKS CONTAINING ALCOHOL DO YOU HAVE ON A TYPICAL DAY: PATIENT DOES NOT DRINK
HOW OFTEN DO YOU HAVE A DRINK CONTAINING ALCOHOL: NEVER

## 2023-05-09 NOTE — PROGRESS NOTES
1. \"Have you been to the ER, urgent care clinic since your last visit? Hospitalized since your last visit? \" No    2. \"Have you seen or consulted any other health care providers outside of the [address] [place] [address] since your last visit? \" No     3. For patients aged 39-70: Has the patient had a colonoscopy / FIT/ Cologuard? NA - based on age      If the patient is female:    4. For patients aged 41-77: Has the patient had a mammogram within the past 2 years? NA - based on age or sex      11. For patients aged 21-65: Has the patient had a pap smear? NA - based on age or sex    Chief Complaint   Patient presents with    Medicare AWV     /73 (Site: Left Upper Arm, Position: Sitting, Cuff Size: Small Adult)   Pulse 52   Temp 98 °F (36.7 °C) (Oral)   Resp 18   Ht 5' 5\" (1.651 m)   Wt 126 lb (57.2 kg)   SpO2 98%   BMI 20.97 kg/m²   Medicare Annual Wellness Visit    Gabriel Palomares is here for Medicare AWV    Assessment & Plan   Medicare annual wellness visit, subsequent  Unintentional weight loss      Recommendations for Preventive Services Due: see orders and patient instructions/AVS.  Recommended screening schedule for the next 5-10 years is provided to the patient in written form: see Patient Instructions/AVS.     Return in 3 months (on 8/9/2023). Subjective       Patient's complete Health Risk Assessment and screening values have been reviewed and are found in Flowsheets. The following problems were reviewed today and where indicated follow up appointments were made and/or referrals ordered. Positive Risk Factor Screenings with Interventions:       Cognitive: Words recalled: 2 Words Recalled   Clock Drawing Test (CDT): (!) Abnormal   Total Score: (!) 2   Total Score Interpretation: Abnormal Mini-Cog      Interventions:    She follows neurologist DR Loni Whittaker for dementia. Recently her Namenda is increased to 5 mg twice a day and that is causing her lack of appetite.   She does not have nausea

## 2023-05-09 NOTE — PATIENT INSTRUCTIONS
colors. You say what number or symbol you see. Your doctor may have you trace the number or symbol using a pointer. How do these tests feel? There is very little chance of having a problem from this test. If dilating drops are used for a vision test, they may make the eyes sting and cause a medicine taste in the mouth. Follow-up care is a key part of your treatment and safety. Be sure to make and go to all appointments, and call your doctor if you are having problems. It's also a good idea to know your test results and keep a list of the medicines you take. Where can you learn more? Go to http://www.galindo.com/ and enter G551 to learn more about \"Learning About Vision Tests. \"  Current as of: October 12, 2022               Content Version: 13.6  © 2235-4862 [employer]wise, [employer]. Care instructions adapted under license by [facility] ([facility]). If you have questions about a medical condition or this instruction, always ask your healthcare professional. [place] [address] any warranty or liability for your use of this information. Advance Directives: Care Instructions  Overview  An advance directive is a legal way to state your wishes at the end of your life. It tells your family and your doctor what to do if you can't say what you want. There are two main types of advance directives. You can change them any time your wishes change. Living will. This form tells your family and your doctor your wishes about life support and other treatment. The form is also called a declaration. Medical power of . This form lets you name a person to make treatment decisions for you when you can't speak for yourself. This person is called a health care agent (health care proxy, health care surrogate). The form is also called a durable power of  for health care.   If you do not have an advance directive, decisions about your medical care may be made by a family member, or by a

## 2023-05-10 ENCOUNTER — CLINICAL DOCUMENTATION (OUTPATIENT)
Dept: SPIRITUAL SERVICES | Age: 83
End: 2023-05-10

## 2023-05-22 ENCOUNTER — CLINICAL DOCUMENTATION (OUTPATIENT)
Dept: CASE MANAGEMENT | Age: 83
End: 2023-05-22

## 2023-05-22 NOTE — ACP (ADVANCE CARE PLANNING)
Advance Care Planning     Advance Care Planning Clinical Specialist  Conversation Note      Date of ACP Conversation: 5/22/2023    Conversation Conducted with:  Healthcare Decision Maker: Next of Kin by law (only applies in absence of above) (name) Spouse, Jony Lundberg    ACP Clinical Specialist: Margarita Pelaez LCSW    Healthcare Decision Maker:     Current Designated Healthcare Decision Maker:     Primary Decision Maker: Ondina [facility] - [phone]    Care Preferences    Hospitalization: \"If your health worsens and it becomes clear that your chance of recovery is unlikely, what would your preference be regarding hospitalization? \"    Choice:  [] The patient wants hospitalization. [] The patient prefers comfort-focused treatment without hospitalization. Pt unable to make this medical decision    Ventilation: \"If you were in your present state of health and suddenly became very ill and were unable to breathe on your own, what would your preference be about the use of a ventilator (breathing machine) if it were available to you? \"      If the patient would desire the use of ventilator (breathing machine), answer \"yes\". If not, \"no\": Pt unable to make this medical decision    \"If your health worsens and it becomes clear that your chance of recovery is unlikely, what would your preference be about the use of a ventilator (breathing machine) if it were available to you? \"     Would the patient desire the use of ventilator (breathing machine)?: Pt unable to make this medical decision      Resuscitation  \"CPR works best to restart the heart when there is a sudden event, like a heart attack, in someone who is otherwise healthy. Unfortunately, CPR does not typically restart the heart for people who have serious health conditions or who are very sick. \"    \"In the event your heart stopped as a result of an underlying serious health condition, would you want attempts to be made to restart your heart (answer

## 2023-08-06 PROBLEM — R68.89 FORGETFULNESS: Status: RESOLVED | Noted: 2020-12-22 | Resolved: 2023-08-06

## 2023-08-06 PROBLEM — R41.3 MEMORY IMPAIRMENT: Status: RESOLVED | Noted: 2021-08-31 | Resolved: 2023-08-06

## 2023-08-09 ENCOUNTER — OFFICE VISIT (OUTPATIENT)
Facility: CLINIC | Age: 83
End: 2023-08-09
Payer: MEDICARE

## 2023-08-09 VITALS
OXYGEN SATURATION: 94 % | RESPIRATION RATE: 18 BRPM | WEIGHT: 115 LBS | HEIGHT: 67 IN | SYSTOLIC BLOOD PRESSURE: 108 MMHG | BODY MASS INDEX: 18.05 KG/M2 | HEART RATE: 60 BPM | DIASTOLIC BLOOD PRESSURE: 60 MMHG | TEMPERATURE: 97.8 F

## 2023-08-09 DIAGNOSIS — E78.00 PURE HYPERCHOLESTEROLEMIA: ICD-10-CM

## 2023-08-09 DIAGNOSIS — I10 WHITE COAT SYNDROME WITH HYPERTENSION: ICD-10-CM

## 2023-08-09 DIAGNOSIS — F03.90 DEMENTIA WITHOUT BEHAVIORAL DISTURBANCE (HCC): ICD-10-CM

## 2023-08-09 DIAGNOSIS — M54.16 LUMBAR RADICULOPATHY: ICD-10-CM

## 2023-08-09 DIAGNOSIS — F41.8 SITUATIONAL ANXIETY: ICD-10-CM

## 2023-08-09 DIAGNOSIS — N18.31 STAGE 3A CHRONIC KIDNEY DISEASE (HCC): ICD-10-CM

## 2023-08-09 DIAGNOSIS — I10 WHITE COAT SYNDROME WITH HYPERTENSION: Primary | ICD-10-CM

## 2023-08-09 DIAGNOSIS — K92.1 STOOL COLOR BLACK: ICD-10-CM

## 2023-08-09 DIAGNOSIS — M85.89 OSTEOPENIA OF MULTIPLE SITES: ICD-10-CM

## 2023-08-09 PROCEDURE — G8427 DOCREV CUR MEDS BY ELIG CLIN: HCPCS | Performed by: INTERNAL MEDICINE

## 2023-08-09 PROCEDURE — G8399 PT W/DXA RESULTS DOCUMENT: HCPCS | Performed by: INTERNAL MEDICINE

## 2023-08-09 PROCEDURE — 1090F PRES/ABSN URINE INCON ASSESS: CPT | Performed by: INTERNAL MEDICINE

## 2023-08-09 PROCEDURE — G8419 CALC BMI OUT NRM PARAM NOF/U: HCPCS | Performed by: INTERNAL MEDICINE

## 2023-08-09 PROCEDURE — 1123F ACP DISCUSS/DSCN MKR DOCD: CPT | Performed by: INTERNAL MEDICINE

## 2023-08-09 PROCEDURE — 99214 OFFICE O/P EST MOD 30 MIN: CPT | Performed by: INTERNAL MEDICINE

## 2023-08-09 PROCEDURE — 1036F TOBACCO NON-USER: CPT | Performed by: INTERNAL MEDICINE

## 2023-08-09 PROCEDURE — 3074F SYST BP LT 130 MM HG: CPT | Performed by: INTERNAL MEDICINE

## 2023-08-09 PROCEDURE — 3078F DIAST BP <80 MM HG: CPT | Performed by: INTERNAL MEDICINE

## 2023-08-09 SDOH — ECONOMIC STABILITY: FOOD INSECURITY: WITHIN THE PAST 12 MONTHS, THE FOOD YOU BOUGHT JUST DIDN'T LAST AND YOU DIDN'T HAVE MONEY TO GET MORE.: NEVER TRUE

## 2023-08-09 SDOH — ECONOMIC STABILITY: FOOD INSECURITY: WITHIN THE PAST 12 MONTHS, YOU WORRIED THAT YOUR FOOD WOULD RUN OUT BEFORE YOU GOT MONEY TO BUY MORE.: NEVER TRUE

## 2023-08-09 SDOH — ECONOMIC STABILITY: INCOME INSECURITY: HOW HARD IS IT FOR YOU TO PAY FOR THE VERY BASICS LIKE FOOD, HOUSING, MEDICAL CARE, AND HEATING?: NOT HARD AT ALL

## 2023-08-09 ASSESSMENT — ENCOUNTER SYMPTOMS
ALLERGIC/IMMUNOLOGIC NEGATIVE: 1
EYES NEGATIVE: 1
RESPIRATORY NEGATIVE: 1

## 2023-08-09 NOTE — PROGRESS NOTES
Adam Godinez (: 1940) is a 82 y.o. female, Established patient patient, here for evaluation of the following chief complaint(s):  Follow-up Chronic Condition         ASSESSMENT/PLAN:  Below is the assessment and plan developed based on review of pertinent history, physical exam, labs, studies, and medications. 1. White coat syndrome with hypertension  -     CBC with Auto Differential; Future  -     Comprehensive Metabolic Panel; Future  2. Dementia without behavioral disturbance ([address])  3. Situational anxiety  4. Pure hypercholesterolemia  -     Lipid Panel; Future  -     Comprehensive Metabolic Panel; Future  5. Stage 3a chronic kidney disease ([address])  6. Osteopenia of multiple sites  7. Lumbar radiculopathy  8. Stool color black  -     AFL - Kallie Up MD, Gastroenterology, [place]      Hypertension, controlled, blood pressure running lower side. Patient is a very poor historian most likely due to having dementia and her  also is vague in providing history however main source of information is from her . Both has pleasant personality but very vague in narrating complaints. I decrease amlodipine to 5 mg/day. Continued losartan/HCTZ 50/12.5 mg once a day and metoprolol ER 50 mg once a day. Hypercholesteremia uncontrolled, she is stopped eating ice cream.  Her  by misunderstanding stopped giving her atorvastatin 20 mg at bedtime. Will start now. Labs ordered. Dementia. She used to see Dr. Bernard Welsh and now she is going to see another Dr. Bernard Welsh is a neurologist she has forgetfulness she is not able to provide the name and exact history getting Namenda 5 mg twice a day and a she also takes sertraline 50 mg/day she does not have depression but according to her  whole day she sleeps a lot does not remain active. Most of the time they eat outside and her  cooks.   And she has not been able to express her thoughts and complaints she said she had seen her Health Maintenance Summary     Topic Due On Due Status Completed On    MAMMOGRAM - BREAST CANCER SCREENING Feb 14, 2019 Not Due Feb 14, 2017    Pap Smear - Cervical Cancer Screening  Mar 17, 2021 Not Due Mar 17, 2016    Diabetes Eye Exam Jan 17, 2018 Not Due Jan 17, 2017    Glycohemoglobin A1C  (Diabetes Sugar)  Aug 23, 2017 Not Due Feb 23, 2017    Microalbumin  (Diabetes Urine Test)  Feb 23, 2018 Not Due Feb 23, 2017    GFR  (Kidney Function Test)  Mar 26, 2018 Not Due Mar 26, 2017    Immunization - TDAP Pregnancy  Hidden     Diabetes Foot Exam  Jan 20, 2018 Not Due Jan 20, 2017    IMMUNIZATION - DTaP/Tdap/Td May 20, 2025 Not Due May 20, 2015    Immunization-Influenza  Completed Nov 30, 2016          Patient is up to date, no discussion needed .

## 2023-08-10 ENCOUNTER — NURSE ONLY (OUTPATIENT)
Facility: CLINIC | Age: 83
End: 2023-08-10

## 2023-08-10 VITALS — HEART RATE: 60 BPM | DIASTOLIC BLOOD PRESSURE: 80 MMHG | SYSTOLIC BLOOD PRESSURE: 120 MMHG

## 2023-08-10 RX ORDER — AMLODIPINE BESYLATE 5 MG/1
5 TABLET ORAL DAILY
Qty: 90 TABLET | Refills: 1 | Status: SHIPPED | OUTPATIENT
Start: 2023-08-10

## 2023-08-10 RX ORDER — ATORVASTATIN CALCIUM 10 MG/1
10 TABLET, FILM COATED ORAL NIGHTLY
Qty: 90 TABLET | Refills: 1 | Status: SHIPPED | OUTPATIENT
Start: 2023-08-10

## 2023-08-10 NOTE — PROGRESS NOTES
Will check blood pressure in the office and it is normal.  Pulses normal.  Medicine reconciliation done. He brought the list of medicine. Amlodipine 5 mg once a day refill sent to the pharmacy, atorvastatin 20 mg at bedtime refill sent to the pharmacy. He brought his machine. Her machine shows the error I checked 3 times. Her machine is not working. They can try to change the battery. And she has upcoming appointment with neurologist Dr. Rafaela Harding to discuss about worsening memory in length. There is no cure for dementia with medicines ,she is on Namenda. She sleeps a lot. She has difficulty in remembering names. Sometimes remains confused as per her . Discussed with her  to make a future plan if memory gets more worsening. No behavioral problems.   Follow-up as scheduled

## 2023-08-11 LAB
ALBUMIN SERPL-MCNC: 4.2 G/DL (ref 3.8–4.8)
ALBUMIN/GLOB SERPL: 1.7 {RATIO} (ref 1.2–2.2)
ALP SERPL-CCNC: 51 IU/L (ref 44–121)
ALT SERPL-CCNC: 10 IU/L (ref 0–32)
AST SERPL-CCNC: 21 IU/L (ref 0–40)
BASOPHILS # BLD AUTO: 0.1 X10E3/UL (ref 0–0.2)
BASOPHILS NFR BLD AUTO: 1 %
BILIRUB SERPL-MCNC: 0.3 MG/DL (ref 0–1.2)
BUN SERPL-MCNC: 23 MG/DL (ref 8–27)
BUN/CREAT SERPL: 16 (ref 12–28)
CALCIUM SERPL-MCNC: 9.9 MG/DL (ref 8.7–10.3)
CHLORIDE SERPL-SCNC: 98 MMOL/L (ref 96–106)
CHOLEST SERPL-MCNC: 290 MG/DL (ref 100–199)
CO2 SERPL-SCNC: 27 MMOL/L (ref 20–29)
CREAT SERPL-MCNC: 1.45 MG/DL (ref 0.57–1)
EGFRCR SERPLBLD CKD-EPI 2021: 36 ML/MIN/1.73
EOSINOPHIL # BLD AUTO: 0.2 X10E3/UL (ref 0–0.4)
EOSINOPHIL NFR BLD AUTO: 2 %
ERYTHROCYTE [DISTWIDTH] IN BLOOD BY AUTOMATED COUNT: 14.1 % (ref 11.7–15.4)
GLOBULIN SER CALC-MCNC: 2.5 G/DL (ref 1.5–4.5)
GLUCOSE SERPL-MCNC: 94 MG/DL (ref 70–99)
HCT VFR BLD AUTO: 32 % (ref 34–46.6)
HDLC SERPL-MCNC: 62 MG/DL
HGB BLD-MCNC: 10.8 G/DL (ref 11.1–15.9)
IMM GRANULOCYTES # BLD AUTO: 0 X10E3/UL (ref 0–0.1)
IMM GRANULOCYTES NFR BLD AUTO: 0 %
LABORATORY COMMENT REPORT: ABNORMAL
LDLC SERPL CALC-MCNC: 210 MG/DL (ref 0–99)
LYMPHOCYTES # BLD AUTO: 2.8 X10E3/UL (ref 0.7–3.1)
LYMPHOCYTES NFR BLD AUTO: 40 %
MCH RBC QN AUTO: 27.6 PG (ref 26.6–33)
MCHC RBC AUTO-ENTMCNC: 33.8 G/DL (ref 31.5–35.7)
MCV RBC AUTO: 82 FL (ref 79–97)
MONOCYTES # BLD AUTO: 0.6 X10E3/UL (ref 0.1–0.9)
MONOCYTES NFR BLD AUTO: 8 %
NEUTROPHILS # BLD AUTO: 3.5 X10E3/UL (ref 1.4–7)
NEUTROPHILS NFR BLD AUTO: 49 %
PLATELET # BLD AUTO: 259 X10E3/UL (ref 150–450)
POTASSIUM SERPL-SCNC: 3.4 MMOL/L (ref 3.5–5.2)
PROT SERPL-MCNC: 6.7 G/DL (ref 6–8.5)
RBC # BLD AUTO: 3.92 X10E6/UL (ref 3.77–5.28)
SODIUM SERPL-SCNC: 141 MMOL/L (ref 134–144)
TRIGL SERPL-MCNC: 102 MG/DL (ref 0–149)
VLDLC SERPL CALC-MCNC: 18 MG/DL (ref 5–40)
WBC # BLD AUTO: 7.1 X10E3/UL (ref 3.4–10.8)

## 2023-08-24 RX ORDER — SERTRALINE HYDROCHLORIDE 25 MG/1
TABLET, FILM COATED ORAL
Qty: 90 TABLET | Refills: 1 | Status: SHIPPED | OUTPATIENT
Start: 2023-08-24

## 2023-09-14 ENCOUNTER — TRANSCRIBE ORDERS (OUTPATIENT)
Facility: HOSPITAL | Age: 83
End: 2023-09-14

## 2023-09-14 DIAGNOSIS — Z12.31 VISIT FOR SCREENING MAMMOGRAM: Primary | ICD-10-CM

## 2023-09-14 RX ORDER — POTASSIUM CHLORIDE 750 MG/1
10 TABLET, EXTENDED RELEASE ORAL DAILY
Qty: 90 TABLET | Refills: 1 | Status: SHIPPED | OUTPATIENT
Start: 2023-09-14

## 2023-09-18 RX ORDER — HYDROXYZINE HYDROCHLORIDE 10 MG/1
TABLET, FILM COATED ORAL
Qty: 90 TABLET | Refills: 0 | Status: SHIPPED | OUTPATIENT
Start: 2023-09-18 | End: 2023-11-09 | Stop reason: ALTCHOICE

## 2023-09-18 RX ORDER — LOSARTAN POTASSIUM AND HYDROCHLOROTHIAZIDE 12.5; 5 MG/1; MG/1
1 TABLET ORAL EVERY MORNING
Qty: 90 TABLET | Refills: 1 | Status: SHIPPED | OUTPATIENT
Start: 2023-09-18

## 2023-09-18 RX ORDER — AMLODIPINE BESYLATE 10 MG/1
10 TABLET ORAL DAILY
Qty: 90 TABLET | Refills: 1 | Status: SHIPPED | OUTPATIENT
Start: 2023-09-18 | End: 2023-11-09 | Stop reason: ALTCHOICE

## 2023-09-20 ENCOUNTER — HOSPITAL ENCOUNTER (OUTPATIENT)
Facility: HOSPITAL | Age: 83
Discharge: HOME | End: 2023-09-23
Attending: INTERNAL MEDICINE
Payer: MEDICARE

## 2023-09-20 VITALS — HEIGHT: 67 IN | WEIGHT: 115 LBS | BODY MASS INDEX: 18.05 KG/M2

## 2023-09-20 DIAGNOSIS — Z12.31 VISIT FOR SCREENING MAMMOGRAM: ICD-10-CM

## 2023-09-20 PROCEDURE — 77063 BREAST TOMOSYNTHESIS BI: CPT

## 2023-11-04 PROBLEM — D64.9 ANEMIA: Status: ACTIVE | Noted: 2023-11-04

## 2023-11-04 PROBLEM — E55.9 VITAMIN D DEFICIENCY: Status: RESOLVED | Noted: 2021-01-22 | Resolved: 2023-11-04

## 2023-11-09 ENCOUNTER — OFFICE VISIT (OUTPATIENT)
Facility: CLINIC | Age: 83
End: 2023-11-09
Payer: MEDICARE

## 2023-11-09 VITALS
TEMPERATURE: 97.5 F | HEART RATE: 60 BPM | OXYGEN SATURATION: 98 % | BODY MASS INDEX: 17.2 KG/M2 | SYSTOLIC BLOOD PRESSURE: 120 MMHG | DIASTOLIC BLOOD PRESSURE: 68 MMHG | HEIGHT: 67 IN | WEIGHT: 109.6 LBS

## 2023-11-09 DIAGNOSIS — M19.90 OSTEOARTHRITIS, UNSPECIFIED OSTEOARTHRITIS TYPE, UNSPECIFIED SITE: ICD-10-CM

## 2023-11-09 DIAGNOSIS — F41.8 SITUATIONAL ANXIETY: ICD-10-CM

## 2023-11-09 DIAGNOSIS — D64.9 ANEMIA, UNSPECIFIED TYPE: ICD-10-CM

## 2023-11-09 DIAGNOSIS — E78.00 PURE HYPERCHOLESTEROLEMIA: ICD-10-CM

## 2023-11-09 DIAGNOSIS — F03.90 DEMENTIA WITHOUT BEHAVIORAL DISTURBANCE (HCC): ICD-10-CM

## 2023-11-09 DIAGNOSIS — R63.4 WEIGHT LOSS: ICD-10-CM

## 2023-11-09 DIAGNOSIS — I10 WHITE COAT SYNDROME WITH HYPERTENSION: Primary | ICD-10-CM

## 2023-11-09 DIAGNOSIS — Z79.899 ON STATIN THERAPY: ICD-10-CM

## 2023-11-09 DIAGNOSIS — I10 WHITE COAT SYNDROME WITH HYPERTENSION: ICD-10-CM

## 2023-11-09 DIAGNOSIS — M85.89 OSTEOPENIA OF MULTIPLE SITES: ICD-10-CM

## 2023-11-09 DIAGNOSIS — N18.31 STAGE 3A CHRONIC KIDNEY DISEASE (HCC): ICD-10-CM

## 2023-11-09 PROCEDURE — 1090F PRES/ABSN URINE INCON ASSESS: CPT | Performed by: INTERNAL MEDICINE

## 2023-11-09 PROCEDURE — G8399 PT W/DXA RESULTS DOCUMENT: HCPCS | Performed by: INTERNAL MEDICINE

## 2023-11-09 PROCEDURE — G8427 DOCREV CUR MEDS BY ELIG CLIN: HCPCS | Performed by: INTERNAL MEDICINE

## 2023-11-09 PROCEDURE — 3078F DIAST BP <80 MM HG: CPT | Performed by: INTERNAL MEDICINE

## 2023-11-09 PROCEDURE — G8419 CALC BMI OUT NRM PARAM NOF/U: HCPCS | Performed by: INTERNAL MEDICINE

## 2023-11-09 PROCEDURE — 1123F ACP DISCUSS/DSCN MKR DOCD: CPT | Performed by: INTERNAL MEDICINE

## 2023-11-09 PROCEDURE — G8484 FLU IMMUNIZE NO ADMIN: HCPCS | Performed by: INTERNAL MEDICINE

## 2023-11-09 PROCEDURE — 99214 OFFICE O/P EST MOD 30 MIN: CPT | Performed by: INTERNAL MEDICINE

## 2023-11-09 PROCEDURE — 1036F TOBACCO NON-USER: CPT | Performed by: INTERNAL MEDICINE

## 2023-11-09 PROCEDURE — 3074F SYST BP LT 130 MM HG: CPT | Performed by: INTERNAL MEDICINE

## 2023-11-09 RX ORDER — OMEPRAZOLE 20 MG/1
20 CAPSULE, DELAYED RELEASE ORAL
Qty: 180 CAPSULE | Refills: 0 | Status: SHIPPED | OUTPATIENT
Start: 2023-11-09

## 2023-11-09 RX ORDER — MIRTAZAPINE 7.5 MG/1
7.5 TABLET, FILM COATED ORAL NIGHTLY
Qty: 90 TABLET | Refills: 1 | Status: SHIPPED | OUTPATIENT
Start: 2023-11-09

## 2023-11-09 ASSESSMENT — ENCOUNTER SYMPTOMS
APNEA: 0
ALLERGIC/IMMUNOLOGIC NEGATIVE: 1
RESPIRATORY NEGATIVE: 1
GASTROINTESTINAL NEGATIVE: 1
EYES NEGATIVE: 1

## 2023-11-09 NOTE — PROGRESS NOTES
Rupinder Hiugera (: 1940) is a 82 y.o. female, Established patient patient, here for evaluation of the following chief complaint(s):  Follow-up         ASSESSMENT/PLAN:  Below is the assessment and plan developed based on review of pertinent history, physical exam, labs, studies, and medications. 1. White coat syndrome with hypertension  -     SAWYER Bright MD, Hematology/Oncology, [place]  -     Comprehensive Metabolic Panel; Future  2. Pure hypercholesterolemia  -     Comprehensive Metabolic Panel; Future  -     Lipid Panel; Future  3. Dementia without behavioral disturbance (HCC)  -     SAWYER Bright MD, Hematology/Oncology, [place]  -     Vitamin B12; Future  4. Weight loss  -     SAWYER Bright MD, Hematology/Oncology, [place]  -     CT ABDOMEN PELVIS WO CONTRAST Additional Contrast? Radiologist Recommendation; Future  -     CBC with Auto Differential; Future  -     CK; Future  -     Sedimentation Rate; Future  -     C-REACTIVE PROTEIN; Future  -     TSH with Reflex to FT4; Future  -     Vitamin B12; Future  5. Stage 3a chronic kidney disease ([address])  -     CT ABDOMEN PELVIS WO CONTRAST Additional Contrast? Radiologist Recommendation; Future  6. Situational anxiety  7. On statin therapy  -     CK; Future  8. Anemia, unspecified type  9. Osteopenia of multiple sites  10. Osteoarthritis, unspecified osteoarthritis type, unspecified site      Hypertension, controlled, continue current dose of amlodipine 5 mg once a day, losartan/HCTZ 50/12.5 mg once a day morning. Diet low in sodium. Hypercholesteremia uncontrolled I am not sure whether she takes atorvastatin 10 mg at bedtime. She came with her  who says she is taking in between she was not taking. She has dementia. Repeat labs ordered. She is fasting. Lack of appetite and weight loss. She has dementia today she has not done breakfast or lunch. Her  takes care of her .   She follows

## 2023-11-10 LAB
ALBUMIN SERPL-MCNC: 4.5 G/DL (ref 3.7–4.7)
ALBUMIN/GLOB SERPL: 2 {RATIO} (ref 1.2–2.2)
ALP SERPL-CCNC: 60 IU/L (ref 44–121)
ALT SERPL-CCNC: 12 IU/L (ref 0–32)
AST SERPL-CCNC: 20 IU/L (ref 0–40)
BASOPHILS # BLD AUTO: 0.1 X10E3/UL (ref 0–0.2)
BASOPHILS NFR BLD AUTO: 1 %
BILIRUB SERPL-MCNC: 0.4 MG/DL (ref 0–1.2)
BUN SERPL-MCNC: 21 MG/DL (ref 8–27)
BUN/CREAT SERPL: 13 (ref 12–28)
CALCIUM SERPL-MCNC: 10.1 MG/DL (ref 8.7–10.3)
CHLORIDE SERPL-SCNC: 101 MMOL/L (ref 96–106)
CHOLEST SERPL-MCNC: 208 MG/DL (ref 100–199)
CK SERPL-CCNC: 67 U/L (ref 26–161)
CO2 SERPL-SCNC: 27 MMOL/L (ref 20–29)
CREAT SERPL-MCNC: 1.66 MG/DL (ref 0.57–1)
CRP SERPL-MCNC: <1 MG/L (ref 0–10)
EGFRCR SERPLBLD CKD-EPI 2021: 31 ML/MIN/1.73
EOSINOPHIL # BLD AUTO: 0.1 X10E3/UL (ref 0–0.4)
EOSINOPHIL NFR BLD AUTO: 1 %
ERYTHROCYTE [DISTWIDTH] IN BLOOD BY AUTOMATED COUNT: 14.3 % (ref 11.7–15.4)
ERYTHROCYTE [SEDIMENTATION RATE] IN BLOOD BY WESTERGREN METHOD: 28 MM/HR (ref 0–40)
GLOBULIN SER CALC-MCNC: 2.3 G/DL (ref 1.5–4.5)
GLUCOSE SERPL-MCNC: 93 MG/DL (ref 70–99)
HCT VFR BLD AUTO: 34.8 % (ref 34–46.6)
HDLC SERPL-MCNC: 70 MG/DL
HGB BLD-MCNC: 11 G/DL (ref 11.1–15.9)
IMM GRANULOCYTES # BLD AUTO: 0 X10E3/UL (ref 0–0.1)
IMM GRANULOCYTES NFR BLD AUTO: 0 %
LDLC SERPL CALC-MCNC: 119 MG/DL (ref 0–99)
LYMPHOCYTES # BLD AUTO: 1.8 X10E3/UL (ref 0.7–3.1)
LYMPHOCYTES NFR BLD AUTO: 24 %
MCH RBC QN AUTO: 26.6 PG (ref 26.6–33)
MCHC RBC AUTO-ENTMCNC: 31.6 G/DL (ref 31.5–35.7)
MCV RBC AUTO: 84 FL (ref 79–97)
MONOCYTES # BLD AUTO: 0.4 X10E3/UL (ref 0.1–0.9)
MONOCYTES NFR BLD AUTO: 5 %
NEUTROPHILS # BLD AUTO: 5.1 X10E3/UL (ref 1.4–7)
NEUTROPHILS NFR BLD AUTO: 69 %
PLATELET # BLD AUTO: 263 X10E3/UL (ref 150–450)
POTASSIUM SERPL-SCNC: 3.5 MMOL/L (ref 3.5–5.2)
PROT SERPL-MCNC: 6.8 G/DL (ref 6–8.5)
RBC # BLD AUTO: 4.13 X10E6/UL (ref 3.77–5.28)
SODIUM SERPL-SCNC: 144 MMOL/L (ref 134–144)
TRIGL SERPL-MCNC: 111 MG/DL (ref 0–149)
TSH SERPL DL<=0.005 MIU/L-ACNC: 1.67 UIU/ML (ref 0.45–4.5)
VIT B12 SERPL-MCNC: 990 PG/ML (ref 232–1245)
VLDLC SERPL CALC-MCNC: 19 MG/DL (ref 5–40)
WBC # BLD AUTO: 7.4 X10E3/UL (ref 3.4–10.8)

## 2023-12-17 RX ORDER — HYDROXYZINE HYDROCHLORIDE 10 MG/1
TABLET, FILM COATED ORAL
Qty: 90 TABLET | Refills: 0 | OUTPATIENT
Start: 2023-12-17

## 2023-12-20 ENCOUNTER — ANESTHESIA (OUTPATIENT)
Facility: HOSPITAL | Age: 83
End: 2023-12-20
Payer: MEDICARE

## 2023-12-20 ENCOUNTER — ANESTHESIA EVENT (OUTPATIENT)
Facility: HOSPITAL | Age: 83
End: 2023-12-20
Payer: MEDICARE

## 2023-12-20 RX ORDER — LIDOCAINE HYDROCHLORIDE 20 MG/ML
INJECTION, SOLUTION EPIDURAL; INFILTRATION; INTRACAUDAL; PERINEURAL PRN
Status: DISCONTINUED | OUTPATIENT
Start: 2023-12-20 | End: 2023-12-20 | Stop reason: SDUPTHER

## 2023-12-20 RX ADMIN — LIDOCAINE HYDROCHLORIDE 100 MG: 20 INJECTION, SOLUTION EPIDURAL; INFILTRATION; INTRACAUDAL; PERINEURAL at 11:22

## 2023-12-20 NOTE — ANESTHESIA POSTPROCEDURE EVALUATION
Department of Anesthesiology  Postprocedure Note    Patient: Kimmy Byrd  MRN: [id number]  YOB: 1940  Date of evaluation: 12/20/2023    Procedure Summary     Date: 12/20/23 Room / Location: [facility] ENDO 04 / [facility] ENDOSCOPY    Anesthesia Start: 1116 Anesthesia Stop: 1128    Procedure: EGD Diagnosis:       Gastrointestinal hemorrhage, unspecified gastrointestinal hemorrhage type      (Gastrointestinal hemorrhage, unspecified gastrointestinal hemorrhage type [K92.2])    Surgeons: Maria Mahoney MD Responsible Provider: Juanjose Mackey MD    Anesthesia Type: MAC ASA Status: 3          Anesthesia Type: MAC    Reji Phase I: Reji Score: 10    Reji Phase II:      Anesthesia Post Evaluation    Patient location during evaluation: bedside  Patient participation: complete - patient participated  Level of consciousness: sleepy but conscious  Pain score: 0  Airway patency: patent  Nausea & Vomiting: no vomiting and no nausea  Cardiovascular status: blood pressure returned to baseline  Respiratory status: acceptable  Hydration status: stable  Pain management: adequate        No notable events documented.

## 2024-01-24 RX ORDER — HYDROXYZINE HYDROCHLORIDE 10 MG/1
TABLET, FILM COATED ORAL
Qty: 90 TABLET | Refills: 0 | OUTPATIENT
Start: 2024-01-24

## 2024-02-05 RX ORDER — AMLODIPINE BESYLATE 5 MG/1
5 TABLET ORAL DAILY
Qty: 90 TABLET | Refills: 2 | Status: SHIPPED | OUTPATIENT
Start: 2024-02-05

## 2024-02-08 PROBLEM — M19.90 OSTEOARTHRITIS, UNSPECIFIED OSTEOARTHRITIS TYPE, UNSPECIFIED SITE: Status: RESOLVED | Noted: 2023-02-07 | Resolved: 2024-02-08

## 2024-02-08 PROBLEM — M15.9 PRIMARY OSTEOARTHRITIS INVOLVING MULTIPLE JOINTS: Status: ACTIVE | Noted: 2024-02-08

## 2024-02-08 PROBLEM — M15.0 PRIMARY OSTEOARTHRITIS INVOLVING MULTIPLE JOINTS: Status: ACTIVE | Noted: 2024-02-08

## 2024-02-13 ENCOUNTER — OFFICE VISIT (OUTPATIENT)
Facility: CLINIC | Age: 84
End: 2024-02-13
Payer: MEDICARE

## 2024-02-13 VITALS
WEIGHT: 114.8 LBS | HEIGHT: 66 IN | RESPIRATION RATE: 16 BRPM | DIASTOLIC BLOOD PRESSURE: 80 MMHG | SYSTOLIC BLOOD PRESSURE: 138 MMHG | HEART RATE: 62 BPM | BODY MASS INDEX: 18.45 KG/M2 | OXYGEN SATURATION: 98 %

## 2024-02-13 DIAGNOSIS — F41.8 SITUATIONAL ANXIETY: ICD-10-CM

## 2024-02-13 DIAGNOSIS — M85.89 OSTEOPENIA OF MULTIPLE SITES: ICD-10-CM

## 2024-02-13 DIAGNOSIS — E78.00 PURE HYPERCHOLESTEROLEMIA: ICD-10-CM

## 2024-02-13 DIAGNOSIS — M54.16 LUMBAR RADICULOPATHY: ICD-10-CM

## 2024-02-13 DIAGNOSIS — Z86.59 HISTORY OF PANIC ATTACKS: ICD-10-CM

## 2024-02-13 DIAGNOSIS — N18.31 STAGE 3A CHRONIC KIDNEY DISEASE (HCC): ICD-10-CM

## 2024-02-13 DIAGNOSIS — M15.9 PRIMARY OSTEOARTHRITIS INVOLVING MULTIPLE JOINTS: ICD-10-CM

## 2024-02-13 DIAGNOSIS — D64.9 ANEMIA, UNSPECIFIED TYPE: ICD-10-CM

## 2024-02-13 DIAGNOSIS — I10 WHITE COAT SYNDROME WITH HYPERTENSION: Primary | ICD-10-CM

## 2024-02-13 DIAGNOSIS — F03.90 DEMENTIA WITHOUT BEHAVIORAL DISTURBANCE (HCC): ICD-10-CM

## 2024-02-13 PROCEDURE — 99214 OFFICE O/P EST MOD 30 MIN: CPT | Performed by: INTERNAL MEDICINE

## 2024-02-13 PROCEDURE — G8399 PT W/DXA RESULTS DOCUMENT: HCPCS | Performed by: INTERNAL MEDICINE

## 2024-02-13 PROCEDURE — G8420 CALC BMI NORM PARAMETERS: HCPCS | Performed by: INTERNAL MEDICINE

## 2024-02-13 PROCEDURE — 1123F ACP DISCUSS/DSCN MKR DOCD: CPT | Performed by: INTERNAL MEDICINE

## 2024-02-13 PROCEDURE — G8484 FLU IMMUNIZE NO ADMIN: HCPCS | Performed by: INTERNAL MEDICINE

## 2024-02-13 PROCEDURE — G8427 DOCREV CUR MEDS BY ELIG CLIN: HCPCS | Performed by: INTERNAL MEDICINE

## 2024-02-13 PROCEDURE — 3079F DIAST BP 80-89 MM HG: CPT | Performed by: INTERNAL MEDICINE

## 2024-02-13 PROCEDURE — 3075F SYST BP GE 130 - 139MM HG: CPT | Performed by: INTERNAL MEDICINE

## 2024-02-13 PROCEDURE — 1090F PRES/ABSN URINE INCON ASSESS: CPT | Performed by: INTERNAL MEDICINE

## 2024-02-13 PROCEDURE — 1036F TOBACCO NON-USER: CPT | Performed by: INTERNAL MEDICINE

## 2024-02-13 RX ORDER — MEMANTINE HYDROCHLORIDE 10 MG/1
10 TABLET ORAL 2 TIMES DAILY
COMMUNITY
Start: 2024-01-26

## 2024-02-13 RX ORDER — METOPROLOL SUCCINATE 25 MG/1
25 TABLET, EXTENDED RELEASE ORAL DAILY
COMMUNITY
Start: 2023-12-13

## 2024-02-13 RX ORDER — DONEPEZIL HYDROCHLORIDE 10 MG/1
10 TABLET, FILM COATED ORAL NIGHTLY
COMMUNITY
Start: 2024-01-15 | End: 2024-02-13 | Stop reason: ALTCHOICE

## 2024-02-13 NOTE — PROGRESS NOTES
Chief Complaint   Patient presents with    Follow-up Chronic Condition         /88 (Site: Right Upper Arm, Position: Sitting)   Pulse 62   Resp 16   Ht 1.676 m (5' 6\")   Wt 52.1 kg (114 lb 12.8 oz)   SpO2 98%   BMI 18.53 kg/m²         \"Have you been to the ER, urgent care clinic since your last visit?  Hospitalized since your last visit?\"    NO    “Have you seen or consulted any other health care providers outside of [facility] since your last visit?”    NO           
   02/13/24 1438 02/13/24 1501   BP: 134/88 138/80   Site: Right Upper Arm Right Upper Arm   Position: Sitting Sitting   Cuff Size:  Medium Adult   Pulse: 62    Resp: 16    SpO2: 98%    Weight: 52.1 kg (114 lb 12.8 oz)    Height: 1.676 m (5' 6\")       Physical Exam  Vitals and nursing note reviewed.   Constitutional:       General: She is not in acute distress.     Appearance: Normal appearance. She is not ill-appearing, toxic-appearing or diaphoretic.   HENT:      Nose: Nose normal. No congestion.      Mouth/Throat:      Mouth: Mucous membranes are moist.   Eyes:      General: No scleral icterus.     Extraocular Movements: Extraocular movements intact.      Conjunctiva/sclera: Conjunctivae normal.      Pupils: Pupils are equal, round, and reactive to light.   Neck:      Vascular: No carotid bruit.   Cardiovascular:      Rate and Rhythm: Normal rate and regular rhythm.      Pulses: Normal pulses.      Heart sounds: Normal heart sounds.   Pulmonary:      Effort: Pulmonary effort is normal.      Breath sounds: Normal breath sounds. No rhonchi or rales.   Abdominal:      General: Bowel sounds are normal. There is no distension.      Palpations: Abdomen is soft.      Tenderness: There is no abdominal tenderness. There is no left CVA tenderness, guarding or rebound.   Musculoskeletal:         General: No swelling or tenderness.      Cervical back: Neck supple.      Right lower leg: No edema.      Left lower leg: No edema.   Skin:     General: Skin is warm.      Findings: No bruising.   Neurological:      General: No focal deficit present.      Mental Status: She is alert and oriented to person, place, and time. Mental status is at baseline.   Psychiatric:         Mood and Affect: Mood normal.         Behavior: Behavior normal.         Thought Content: Thought content normal.       An electronic signature was used to authenticate this note.  -- Iqra Stauffer MD

## 2024-02-14 LAB
BASOPHILS # BLD AUTO: 0 X10E3/UL (ref 0–0.2)
BASOPHILS NFR BLD AUTO: 1 %
BUN SERPL-MCNC: 27 MG/DL (ref 8–27)
BUN/CREAT SERPL: 19 (ref 12–28)
CALCIUM SERPL-MCNC: 10 MG/DL (ref 8.7–10.3)
CHLORIDE SERPL-SCNC: 101 MMOL/L (ref 96–106)
CHOLEST SERPL-MCNC: 206 MG/DL (ref 100–199)
CO2 SERPL-SCNC: 27 MMOL/L (ref 20–29)
CREAT SERPL-MCNC: 1.41 MG/DL (ref 0.57–1)
EGFRCR SERPLBLD CKD-EPI 2021: 37 ML/MIN/1.73
EOSINOPHIL # BLD AUTO: 0.2 X10E3/UL (ref 0–0.4)
EOSINOPHIL NFR BLD AUTO: 2 %
ERYTHROCYTE [DISTWIDTH] IN BLOOD BY AUTOMATED COUNT: 13.5 % (ref 11.7–15.4)
GLUCOSE SERPL-MCNC: 95 MG/DL (ref 70–99)
HCT VFR BLD AUTO: 36.1 % (ref 34–46.6)
HDLC SERPL-MCNC: 78 MG/DL
HGB BLD-MCNC: 11.6 G/DL (ref 11.1–15.9)
IMM GRANULOCYTES # BLD AUTO: 0 X10E3/UL (ref 0–0.1)
IMM GRANULOCYTES NFR BLD AUTO: 0 %
LDLC SERPL CALC-MCNC: 119 MG/DL (ref 0–99)
LYMPHOCYTES # BLD AUTO: 1.9 X10E3/UL (ref 0.7–3.1)
LYMPHOCYTES NFR BLD AUTO: 27 %
MCH RBC QN AUTO: 26.6 PG (ref 26.6–33)
MCHC RBC AUTO-ENTMCNC: 32.1 G/DL (ref 31.5–35.7)
MCV RBC AUTO: 83 FL (ref 79–97)
MONOCYTES # BLD AUTO: 0.5 X10E3/UL (ref 0.1–0.9)
MONOCYTES NFR BLD AUTO: 6 %
NEUTROPHILS # BLD AUTO: 4.6 X10E3/UL (ref 1.4–7)
NEUTROPHILS NFR BLD AUTO: 64 %
PLATELET # BLD AUTO: 248 X10E3/UL (ref 150–450)
POTASSIUM SERPL-SCNC: 3.6 MMOL/L (ref 3.5–5.2)
RBC # BLD AUTO: 4.36 X10E6/UL (ref 3.77–5.28)
SODIUM SERPL-SCNC: 143 MMOL/L (ref 134–144)
TRIGL SERPL-MCNC: 51 MG/DL (ref 0–149)
VLDLC SERPL CALC-MCNC: 9 MG/DL (ref 5–40)
WBC # BLD AUTO: 7.2 X10E3/UL (ref 3.4–10.8)

## 2024-02-22 NOTE — PRE-PROCEDURE INSTRUCTIONS
PAT completed with the patient's .  All questions were answered.  The patient needs to arrival at noon on 2/27.  Patient's  will drive her home after the procedure.

## 2024-03-11 RX ORDER — POTASSIUM CHLORIDE 750 MG/1
10 TABLET, EXTENDED RELEASE ORAL 2 TIMES DAILY
Qty: 180 TABLET | Refills: 1 | Status: SHIPPED | OUTPATIENT
Start: 2024-03-11

## 2024-04-11 RX ORDER — AMLODIPINE BESYLATE 10 MG/1
10 TABLET ORAL DAILY
Qty: 90 TABLET | Refills: 1 | OUTPATIENT
Start: 2024-04-11

## 2024-04-11 RX ORDER — LOSARTAN POTASSIUM AND HYDROCHLOROTHIAZIDE 12.5; 5 MG/1; MG/1
1 TABLET ORAL EVERY MORNING
Qty: 90 TABLET | Refills: 1 | Status: SHIPPED | OUTPATIENT
Start: 2024-04-11

## 2024-04-14 RX ORDER — AMLODIPINE BESYLATE 5 MG/1
5 TABLET ORAL DAILY
Qty: 90 TABLET | Refills: 2 | Status: SHIPPED | OUTPATIENT
Start: 2024-04-14

## 2024-04-26 RX ORDER — OMEPRAZOLE 20 MG/1
20 CAPSULE, DELAYED RELEASE ORAL
Qty: 180 CAPSULE | Refills: 0 | Status: SHIPPED | OUTPATIENT
Start: 2024-04-26

## 2024-05-08 RX ORDER — MIRTAZAPINE 7.5 MG/1
7.5 TABLET, FILM COATED ORAL NIGHTLY
Qty: 90 TABLET | Refills: 1 | Status: SHIPPED | OUTPATIENT
Start: 2024-05-08

## 2024-05-09 PROBLEM — I10 WHITE COAT SYNDROME WITH HYPERTENSION: Status: RESOLVED | Noted: 2020-09-21 | Resolved: 2024-05-09

## 2024-05-09 PROBLEM — M79.10 MUSCLE PAIN: Status: RESOLVED | Noted: 2022-06-16 | Resolved: 2024-05-09

## 2024-05-09 PROBLEM — I10 PRIMARY HYPERTENSION: Status: ACTIVE | Noted: 2024-05-09

## 2024-05-09 PROBLEM — Z86.59 HISTORY OF PANIC ATTACKS: Status: RESOLVED | Noted: 2021-01-22 | Resolved: 2024-05-09

## 2024-05-09 PROBLEM — K92.1 STOOL COLOR BLACK: Status: RESOLVED | Noted: 2023-08-09 | Resolved: 2024-05-09

## 2024-05-13 NOTE — PRE-PROCEDURE INSTRUCTIONS
Pat completed with patient for procedure on 5/22/24. Patient verbalized understanding of arrival time of 1015, npo status, prep instructions, and  will be with her to drive her home.

## 2024-05-15 ENCOUNTER — OFFICE VISIT (OUTPATIENT)
Facility: CLINIC | Age: 84
End: 2024-05-15
Payer: MEDICARE

## 2024-05-15 VITALS
SYSTOLIC BLOOD PRESSURE: 130 MMHG | DIASTOLIC BLOOD PRESSURE: 80 MMHG | OXYGEN SATURATION: 99 % | BODY MASS INDEX: 18.93 KG/M2 | HEART RATE: 54 BPM | WEIGHT: 117.8 LBS | TEMPERATURE: 98 F | HEIGHT: 66 IN

## 2024-05-15 DIAGNOSIS — F03.90 DEMENTIA WITHOUT BEHAVIORAL DISTURBANCE (HCC): ICD-10-CM

## 2024-05-15 DIAGNOSIS — F43.21 SITUATIONAL DEPRESSION: ICD-10-CM

## 2024-05-15 DIAGNOSIS — M15.9 PRIMARY OSTEOARTHRITIS INVOLVING MULTIPLE JOINTS: ICD-10-CM

## 2024-05-15 DIAGNOSIS — D64.9 ANEMIA, UNSPECIFIED TYPE: ICD-10-CM

## 2024-05-15 DIAGNOSIS — M54.16 LUMBAR RADICULOPATHY: ICD-10-CM

## 2024-05-15 DIAGNOSIS — I10 PRIMARY HYPERTENSION: ICD-10-CM

## 2024-05-15 DIAGNOSIS — E78.00 PURE HYPERCHOLESTEROLEMIA: ICD-10-CM

## 2024-05-15 DIAGNOSIS — N18.31 STAGE 3A CHRONIC KIDNEY DISEASE (HCC): ICD-10-CM

## 2024-05-15 DIAGNOSIS — M85.89 OSTEOPENIA OF MULTIPLE SITES: ICD-10-CM

## 2024-05-15 DIAGNOSIS — F41.8 SITUATIONAL ANXIETY: ICD-10-CM

## 2024-05-15 PROCEDURE — 3074F SYST BP LT 130 MM HG: CPT | Performed by: INTERNAL MEDICINE

## 2024-05-15 PROCEDURE — 1036F TOBACCO NON-USER: CPT | Performed by: INTERNAL MEDICINE

## 2024-05-15 PROCEDURE — 1090F PRES/ABSN URINE INCON ASSESS: CPT | Performed by: INTERNAL MEDICINE

## 2024-05-15 PROCEDURE — G8420 CALC BMI NORM PARAMETERS: HCPCS | Performed by: INTERNAL MEDICINE

## 2024-05-15 PROCEDURE — 3079F DIAST BP 80-89 MM HG: CPT | Performed by: INTERNAL MEDICINE

## 2024-05-15 PROCEDURE — 99214 OFFICE O/P EST MOD 30 MIN: CPT | Performed by: INTERNAL MEDICINE

## 2024-05-15 PROCEDURE — G8427 DOCREV CUR MEDS BY ELIG CLIN: HCPCS | Performed by: INTERNAL MEDICINE

## 2024-05-15 PROCEDURE — 1123F ACP DISCUSS/DSCN MKR DOCD: CPT | Performed by: INTERNAL MEDICINE

## 2024-05-15 PROCEDURE — G8399 PT W/DXA RESULTS DOCUMENT: HCPCS | Performed by: INTERNAL MEDICINE

## 2024-05-15 RX ORDER — MIRTAZAPINE 7.5 MG/1
7.5 TABLET, FILM COATED ORAL NIGHTLY
Qty: 90 TABLET | Refills: 1 | Status: SHIPPED | OUTPATIENT
Start: 2024-05-15

## 2024-05-15 RX ORDER — DONEPEZIL HYDROCHLORIDE 10 MG/1
TABLET, FILM COATED ORAL
COMMUNITY
Start: 2024-04-09

## 2024-05-15 ASSESSMENT — ENCOUNTER SYMPTOMS
ALLERGIC/IMMUNOLOGIC NEGATIVE: 1
GASTROINTESTINAL NEGATIVE: 1
RESPIRATORY NEGATIVE: 1
EYES NEGATIVE: 1

## 2024-05-15 NOTE — PROGRESS NOTES
Chief Complaint   Patient presents with    Follow-up     Chronic conditions      BP (!) 152/90 (Site: Right Upper Arm, Position: Sitting, Cuff Size: Medium Adult)   Pulse 50   Temp 98 °F (36.7 °C) (Temporal)   Ht 1.676 m (5' 6\")   Wt 53.4 kg (117 lb 12.8 oz)   SpO2 99%   BMI 19.01 kg/m²       \"Have you been to the ER, urgent care clinic since your last visit?  Hospitalized since your last visit?\"    NO    “Have you seen or consulted any other health care providers outside of [facility] since your last visit?”    NO            Click Here for Release of Records Request   No data recorded       
Paloma Phillips (: 1940) is a 83 y.o. female, Established patient patient, here for evaluation of the following chief complaint(s):  Follow-up (Chronic conditions )         ASSESSMENT/PLAN:  Below is the assessment and plan developed based on review of pertinent history, physical exam, labs, studies, and medications.      1. Primary hypertension  -     CBC with Auto Differential; Future  -     Comprehensive Metabolic Panel; Future  2. Pure hypercholesterolemia  -     Lipid Panel; Future  -     Comprehensive Metabolic Panel; Future  3. Dementia without behavioral disturbance (HCC)  4. Stage 3a chronic kidney disease (HCC)  -     CBC with Auto Differential; Future  -     Lipid Panel; Future  5. Situational depression  6. Osteopenia of multiple sites  7. Situational anxiety  8. Lumbar radiculopathy  9. Anemia, unspecified type  10. Primary osteoarthritis involving multiple joints    Hypertension well-controlled.  She has asymptomatic bradycardia so I decreased metoprolol ER 12.5 mg once a day continue current dose of amlodipine and losartan/HCTZ.    Dementia follows neurologist she has no behavioral problems taking donepezil and memantine and donepezil can cause some bradycardia.  She has no dizziness or lightheadedness    Low appetite she has responded taking mirtazapine no anxiety spells    Taking omeprazole given blindly for gastritis    Hypercholesteremia getting atorvastatin 10 mg at bedtime and hypokalemia getting Klor-Con 10 mill equivalent twice a day.  Labs ordered.    She is taking vitamins.  She has dementia so we will do Medicare wellness visit in next visit.  In next visit we will not do mini cog because she cannot draw clock or she cannot recall 3 words otherwise she is alert awake oriented 3 times.  Independent for ADL independent for IADLs on her .    Return in about 3 months (around 8/15/2024) for Medicare Wellness visit, follow for chronic condition no minicog as has dementia.. 
nightly 90 tablet 1    acetaminophen (TYLENOL) 500 MG tablet Take 1 tablet by mouth 2 times daily as needed (Patient not taking: Reported on 5/15/2024)       No current facility-administered medications for this visit.      Past Medical History:   Diagnosis Date    Anemia 2023    Anxiety     Asthma     Chronic renal disease, stage III (HCC) 2022    Hypercholesterolemia     Hypertension     Osteoarthritis, unspecified osteoarthritis type, unspecified site 2023    Osteoporosis     Situational depression 2020     Past Surgical History:   Procedure Laterality Date    COLONOSCOPY  11/10/2015    HYSTERECTOMY (CERVIX STATUS UNKNOWN)      THYROIDECTOMY      UPPER GASTROINTESTINAL ENDOSCOPY N/A 2023    EGD performed by Chaparro Vergara MD at [facility] ENDOSCOPY    UPPER GASTROINTESTINAL ENDOSCOPY N/A 2023    EGD BIOPSY performed by Chaparro Vergara MD at [facility] ENDOSCOPY     Family History   Problem Relation Age of Onset    Alzheimer's Disease Mother     Dementia Mother     Heart Disease Maternal Grandmother      Social History     Tobacco Use   Smoking Status Former    Current packs/day: 0.00    Average packs/day: 0.3 packs/day for 9.0 years (2.3 ttl pk-yrs)    Types: Cigarettes    Start date: 2002    Quit date: 2011    Years since quittin.3   Smokeless Tobacco Former             Review of Systems              Vitals:    05/15/24 1309   BP: (!) 152/90   Site: Right Upper Arm   Position: Sitting   Cuff Size: Medium Adult   Pulse: 50   Temp: 98 °F (36.7 °C)   TempSrc: Temporal   SpO2: 99%   Weight: 53.4 kg (117 lb 12.8 oz)   Height: 1.676 m (5' 6\")          Physical Exam                An electronic signature was used to authenticate this note.  -- Iqra Stauffer MD

## 2024-05-16 LAB
ALBUMIN SERPL-MCNC: 4.3 G/DL (ref 3.7–4.7)
ALBUMIN/GLOB SERPL: 1.5 {RATIO} (ref 1.2–2.2)
ALP SERPL-CCNC: 78 IU/L (ref 44–121)
ALT SERPL-CCNC: 19 IU/L (ref 0–32)
AST SERPL-CCNC: 27 IU/L (ref 0–40)
BASOPHILS # BLD AUTO: 0.1 X10E3/UL (ref 0–0.2)
BASOPHILS NFR BLD AUTO: 1 %
BILIRUB SERPL-MCNC: 0.3 MG/DL (ref 0–1.2)
BUN SERPL-MCNC: 19 MG/DL (ref 8–27)
BUN/CREAT SERPL: 13 (ref 12–28)
CALCIUM SERPL-MCNC: 9.8 MG/DL (ref 8.7–10.3)
CHLORIDE SERPL-SCNC: 103 MMOL/L (ref 96–106)
CHOLEST SERPL-MCNC: 207 MG/DL (ref 100–199)
CO2 SERPL-SCNC: 28 MMOL/L (ref 20–29)
CREAT SERPL-MCNC: 1.42 MG/DL (ref 0.57–1)
EGFRCR SERPLBLD CKD-EPI 2021: 37 ML/MIN/1.73
EOSINOPHIL # BLD AUTO: 0.1 X10E3/UL (ref 0–0.4)
EOSINOPHIL NFR BLD AUTO: 1 %
ERYTHROCYTE [DISTWIDTH] IN BLOOD BY AUTOMATED COUNT: 14.1 % (ref 11.7–15.4)
GLOBULIN SER CALC-MCNC: 2.8 G/DL (ref 1.5–4.5)
GLUCOSE SERPL-MCNC: 96 MG/DL (ref 70–99)
HCT VFR BLD AUTO: 35.8 % (ref 34–46.6)
HDLC SERPL-MCNC: 80 MG/DL
HGB BLD-MCNC: 11.3 G/DL (ref 11.1–15.9)
IMM GRANULOCYTES # BLD AUTO: 0 X10E3/UL (ref 0–0.1)
IMM GRANULOCYTES NFR BLD AUTO: 0 %
LDLC SERPL CALC-MCNC: 113 MG/DL (ref 0–99)
LYMPHOCYTES # BLD AUTO: 1.5 X10E3/UL (ref 0.7–3.1)
LYMPHOCYTES NFR BLD AUTO: 23 %
MCH RBC QN AUTO: 26.3 PG (ref 26.6–33)
MCHC RBC AUTO-ENTMCNC: 31.6 G/DL (ref 31.5–35.7)
MCV RBC AUTO: 83 FL (ref 79–97)
MONOCYTES # BLD AUTO: 0.4 X10E3/UL (ref 0.1–0.9)
MONOCYTES NFR BLD AUTO: 6 %
NEUTROPHILS # BLD AUTO: 4.5 X10E3/UL (ref 1.4–7)
NEUTROPHILS NFR BLD AUTO: 69 %
PLATELET # BLD AUTO: 238 X10E3/UL (ref 150–450)
POTASSIUM SERPL-SCNC: 3.5 MMOL/L (ref 3.5–5.2)
PROT SERPL-MCNC: 7.1 G/DL (ref 6–8.5)
RBC # BLD AUTO: 4.29 X10E6/UL (ref 3.77–5.28)
SODIUM SERPL-SCNC: 145 MMOL/L (ref 134–144)
TRIGL SERPL-MCNC: 77 MG/DL (ref 0–149)
VLDLC SERPL CALC-MCNC: 14 MG/DL (ref 5–40)
WBC # BLD AUTO: 6.6 X10E3/UL (ref 3.4–10.8)

## 2024-05-21 ENCOUNTER — ANESTHESIA EVENT (OUTPATIENT)
Facility: HOSPITAL | Age: 84
End: 2024-05-21
Payer: MEDICARE

## 2024-05-21 NOTE — ANESTHESIA PRE PROCEDURE
Department of Anesthesiology  Preprocedure Note       Name:  Paloma Phillips   Age:  83 y.o.  :  1940                                          MRN:  [id number]         Date:  2024      Surgeon: Surgeon(s):  Chaparro Vergara MD    Procedure: Procedure(s):  COLONOSCOPY    Medications prior to admission:   Prior to Admission medications    Medication Sig Start Date End Date Taking? Authorizing Provider   donepezil (ARICEPT) 10 MG tablet BY MOUTH EVERY EVENING AFTER THE DINNER X2 WEEKS THEN 1 TABLET EVERY EVENING AFTER DINNER 24   Jax Walker MD   mirtazapine (REMERON) 7.5 MG tablet Take 1 tablet by mouth nightly 5/15/24   Iqra Stauffer MD   omeprazole (PRILOSEC) 20 MG delayed release capsule TAKE 1 CAPSULE BY MOUTH 2 TIMES DAILY (BEFORE MEALS). 24   Iqra Stauffer MD   amLODIPine (NORVASC) 5 MG tablet Take 1 tablet by mouth daily 24   Iqra Stauffer MD   losartan-hydroCHLOROthiazide (HYZAAR) 50-12.5 MG per tablet TAKE 1 TABLET BY MOUTH EVERY DAY IN THE MORNING 24   Iqra Stauffer MD   potassium chloride (KLOR-CON M10) 10 MEQ extended release tablet Take 1 tablet by mouth 2 times daily 3/11/24   Iqra Stauffer MD   memantine (NAMENDA) 10 MG tablet Take 1 tablet by mouth 2 times daily 24   Jax Walker MD   metoprolol succinate (TOPROL XL) 25 MG extended release tablet Take 0.5 tablets by mouth daily  to take half pill daily asymptomatic bradycardia on donapezil /asymptomatic. 23   Jax Walker MD   Multiple Vitamins-Minerals (CENTRUM SILVER 50+WOMEN PO) Take 1 tablet by mouth daily    Jax Walker MD   atorvastatin (LIPITOR) 10 MG tablet Take 1 tablet by mouth nightly 8/10/23   Iqra Stauffer MD       Current medications:    No current facility-administered medications for this encounter.     Current Outpatient Medications   Medication Sig Dispense Refill   • donepezil (ARICEPT) 10 MG tablet BY MOUTH EVERY EVENING

## 2024-05-22 ENCOUNTER — HOSPITAL ENCOUNTER (OUTPATIENT)
Facility: HOSPITAL | Age: 84
Setting detail: OUTPATIENT SURGERY
Discharge: HOME | End: 2024-05-22
Attending: INTERNAL MEDICINE | Admitting: INTERNAL MEDICINE
Payer: MEDICARE

## 2024-05-22 ENCOUNTER — ANESTHESIA (OUTPATIENT)
Facility: HOSPITAL | Age: 84
End: 2024-05-22
Payer: MEDICARE

## 2024-05-22 VITALS
SYSTOLIC BLOOD PRESSURE: 163 MMHG | TEMPERATURE: 97.7 F | DIASTOLIC BLOOD PRESSURE: 83 MMHG | OXYGEN SATURATION: 96 % | RESPIRATION RATE: 16 BRPM | HEART RATE: 60 BPM

## 2024-05-22 PROCEDURE — 2580000003 HC RX 258: Performed by: INTERNAL MEDICINE

## 2024-05-22 PROCEDURE — 6360000002 HC RX W HCPCS: Performed by: NURSE ANESTHETIST, CERTIFIED REGISTERED

## 2024-05-22 PROCEDURE — 3700000001 HC ADD 15 MINUTES (ANESTHESIA): Performed by: INTERNAL MEDICINE

## 2024-05-22 PROCEDURE — 7100000010 HC PHASE II RECOVERY - FIRST 15 MIN: Performed by: INTERNAL MEDICINE

## 2024-05-22 PROCEDURE — 3600007511: Performed by: INTERNAL MEDICINE

## 2024-05-22 PROCEDURE — 2500000003 HC RX 250 WO HCPCS: Performed by: NURSE ANESTHETIST, CERTIFIED REGISTERED

## 2024-05-22 PROCEDURE — 7100000011 HC PHASE II RECOVERY - ADDTL 15 MIN: Performed by: INTERNAL MEDICINE

## 2024-05-22 PROCEDURE — 2709999900 HC NON-CHARGEABLE SUPPLY: Performed by: INTERNAL MEDICINE

## 2024-05-22 PROCEDURE — 3700000000 HC ANESTHESIA ATTENDED CARE: Performed by: INTERNAL MEDICINE

## 2024-05-22 PROCEDURE — 3600007501: Performed by: INTERNAL MEDICINE

## 2024-05-22 RX ORDER — SODIUM CHLORIDE 9 MG/ML
INJECTION, SOLUTION INTRAVENOUS CONTINUOUS
Status: DISCONTINUED | OUTPATIENT
Start: 2024-05-22 | End: 2024-05-22 | Stop reason: HOSPADM

## 2024-05-22 RX ORDER — SODIUM CHLORIDE, SODIUM LACTATE, POTASSIUM CHLORIDE, CALCIUM CHLORIDE 600; 310; 30; 20 MG/100ML; MG/100ML; MG/100ML; MG/100ML
INJECTION, SOLUTION INTRAVENOUS CONTINUOUS
Status: DISCONTINUED | OUTPATIENT
Start: 2024-05-22 | End: 2024-05-22 | Stop reason: HOSPADM

## 2024-05-22 RX ADMIN — PROPOFOL 50 MG: 10 INJECTION, EMULSION INTRAVENOUS at 13:18

## 2024-05-22 RX ADMIN — PROPOFOL 70 MG: 10 INJECTION, EMULSION INTRAVENOUS at 13:10

## 2024-05-22 RX ADMIN — LIDOCAINE HYDROCHLORIDE 40 MG: 20 INJECTION, SOLUTION EPIDURAL; INFILTRATION; INTRACAUDAL; PERINEURAL at 13:00

## 2024-05-22 RX ADMIN — PROPOFOL 80 MG: 10 INJECTION, EMULSION INTRAVENOUS at 13:00

## 2024-05-22 RX ADMIN — SODIUM CHLORIDE: 9 INJECTION, SOLUTION INTRAVENOUS at 12:50

## 2024-05-22 ASSESSMENT — PAIN - FUNCTIONAL ASSESSMENT
PAIN_FUNCTIONAL_ASSESSMENT: NONE - DENIES PAIN

## 2024-05-22 NOTE — ANESTHESIA POSTPROCEDURE EVALUATION
Department of Anesthesiology  Postprocedure Note    Patient: Paloma Phillips  MRN: [id number]  YOB: 1940  Date of evaluation: 5/22/2024    Procedure Summary       Date: 05/22/24 Room / Location: [facility] 02 / [facility] ENDOSCOPY    Anesthesia Start: 1255 Anesthesia Stop: 1324    Procedure: COLONOSCOPY (Lower GI Region) Diagnosis:       Colon cancer screening      (Colon cancer screening [Z12.11])    Surgeons: Chaparro Vergara MD Responsible Provider: Kwan Banegas MD    Anesthesia Type: MAC ASA Status: 3            Anesthesia Type: MAC    Reji Phase I: Reji Score: 10    Reji Phase II:      Anesthesia Post Evaluation    Patient location during evaluation: bedside (Endoscopy Unit)  Patient participation: complete - patient participated  Level of consciousness: sleepy but conscious  Pain score: 0  Airway patency: patent  Nausea & Vomiting: no nausea and no vomiting  Cardiovascular status: hemodynamically stable  Respiratory status: acceptable  Hydration status: stable  Comments: This patient remained on the stretcher.  The patient was handed off to the endoscopy nursing team.  All questions regarding pre-, intra-, and postoperative care were answered.  Multimodal analgesia pain management approach    No notable events documented.

## 2024-05-22 NOTE — PERIOP NOTE
Discharge instructions printed and provided to patient and  with all questions answered in full. PIV x1 removed with cath tip intact. Pt VSS and no signs of distress noted. Pt tolerating liquids and solids with no issues. Pt ambulating within room with no issues. Pt wheeled down to main entrance accompanied by staff. Pt condition stable at time of discharge.      Dr. Vergara saw this patient at the bedside prior to discharge.

## 2024-07-24 RX ORDER — OMEPRAZOLE 20 MG/1
20 CAPSULE, DELAYED RELEASE ORAL
Qty: 180 CAPSULE | Refills: 0 | Status: SHIPPED | OUTPATIENT
Start: 2024-07-24

## 2024-08-23 ENCOUNTER — OFFICE VISIT (OUTPATIENT)
Facility: CLINIC | Age: 84
End: 2024-08-23

## 2024-08-23 VITALS
HEART RATE: 60 BPM | WEIGHT: 129.2 LBS | HEIGHT: 66 IN | RESPIRATION RATE: 18 BRPM | SYSTOLIC BLOOD PRESSURE: 128 MMHG | TEMPERATURE: 98 F | DIASTOLIC BLOOD PRESSURE: 80 MMHG | OXYGEN SATURATION: 99 % | BODY MASS INDEX: 20.76 KG/M2

## 2024-08-23 DIAGNOSIS — D64.9 ANEMIA, UNSPECIFIED TYPE: ICD-10-CM

## 2024-08-23 DIAGNOSIS — E55.9 VITAMIN D DEFICIENCY: ICD-10-CM

## 2024-08-23 DIAGNOSIS — M85.89 OSTEOPENIA OF MULTIPLE SITES: ICD-10-CM

## 2024-08-23 DIAGNOSIS — I10 PRIMARY HYPERTENSION: ICD-10-CM

## 2024-08-23 DIAGNOSIS — Z00.00 MEDICARE ANNUAL WELLNESS VISIT, SUBSEQUENT: ICD-10-CM

## 2024-08-23 DIAGNOSIS — N18.31 STAGE 3A CHRONIC KIDNEY DISEASE (HCC): ICD-10-CM

## 2024-08-23 DIAGNOSIS — M54.16 LUMBAR RADICULOPATHY: ICD-10-CM

## 2024-08-23 DIAGNOSIS — F41.8 SITUATIONAL ANXIETY: ICD-10-CM

## 2024-08-23 DIAGNOSIS — M15.9 PRIMARY OSTEOARTHRITIS INVOLVING MULTIPLE JOINTS: ICD-10-CM

## 2024-08-23 DIAGNOSIS — E78.00 PURE HYPERCHOLESTEROLEMIA: ICD-10-CM

## 2024-08-23 DIAGNOSIS — F03.90 DEMENTIA WITHOUT BEHAVIORAL DISTURBANCE (HCC): ICD-10-CM

## 2024-08-23 RX ORDER — SENNOSIDES 8.6 MG
650 CAPSULE ORAL 2 TIMES DAILY PRN
Qty: 90 TABLET | Refills: 1 | Status: SHIPPED | OUTPATIENT
Start: 2024-08-23

## 2024-08-23 SDOH — ECONOMIC STABILITY: FOOD INSECURITY: WITHIN THE PAST 12 MONTHS, THE FOOD YOU BOUGHT JUST DIDN'T LAST AND YOU DIDN'T HAVE MONEY TO GET MORE.: NEVER TRUE

## 2024-08-23 SDOH — ECONOMIC STABILITY: FOOD INSECURITY: WITHIN THE PAST 12 MONTHS, YOU WORRIED THAT YOUR FOOD WOULD RUN OUT BEFORE YOU GOT MONEY TO BUY MORE.: NEVER TRUE

## 2024-08-23 SDOH — ECONOMIC STABILITY: INCOME INSECURITY: HOW HARD IS IT FOR YOU TO PAY FOR THE VERY BASICS LIKE FOOD, HOUSING, MEDICAL CARE, AND HEATING?: NOT HARD AT ALL

## 2024-08-23 ASSESSMENT — ENCOUNTER SYMPTOMS
GASTROINTESTINAL NEGATIVE: 1
ALLERGIC/IMMUNOLOGIC NEGATIVE: 1
RESPIRATORY NEGATIVE: 1

## 2024-08-23 ASSESSMENT — PATIENT HEALTH QUESTIONNAIRE - PHQ9
9. THOUGHTS THAT YOU WOULD BE BETTER OFF DEAD, OR OF HURTING YOURSELF: NOT AT ALL
1. LITTLE INTEREST OR PLEASURE IN DOING THINGS: NOT AT ALL
4. FEELING TIRED OR HAVING LITTLE ENERGY: NOT AT ALL
3. TROUBLE FALLING OR STAYING ASLEEP: NOT AT ALL
SUM OF ALL RESPONSES TO PHQ QUESTIONS 1-9: 0
5. POOR APPETITE OR OVEREATING: NOT AT ALL
SUM OF ALL RESPONSES TO PHQ QUESTIONS 1-9: 0
7. TROUBLE CONCENTRATING ON THINGS, SUCH AS READING THE NEWSPAPER OR WATCHING TELEVISION: NOT AT ALL
6. FEELING BAD ABOUT YOURSELF - OR THAT YOU ARE A FAILURE OR HAVE LET YOURSELF OR YOUR FAMILY DOWN: NOT AT ALL
8. MOVING OR SPEAKING SO SLOWLY THAT OTHER PEOPLE COULD HAVE NOTICED. OR THE OPPOSITE, BEING SO FIGETY OR RESTLESS THAT YOU HAVE BEEN MOVING AROUND A LOT MORE THAN USUAL: NOT AT ALL
SUM OF ALL RESPONSES TO PHQ QUESTIONS 1-9: 0
SUM OF ALL RESPONSES TO PHQ9 QUESTIONS 1 & 2: 0
SUM OF ALL RESPONSES TO PHQ QUESTIONS 1-9: 0
10. IF YOU CHECKED OFF ANY PROBLEMS, HOW DIFFICULT HAVE THESE PROBLEMS MADE IT FOR YOU TO DO YOUR WORK, TAKE CARE OF THINGS AT HOME, OR GET ALONG WITH OTHER PEOPLE: NOT DIFFICULT AT ALL
2. FEELING DOWN, DEPRESSED OR HOPELESS: NOT AT ALL

## 2024-08-23 NOTE — PATIENT INSTRUCTIONS

## 2024-08-23 NOTE — PROGRESS NOTES
Chief Complaint   Patient presents with    Medicare AWV     /80 (Site: Left Upper Arm, Position: Sitting)   Pulse 50   Temp 98 °F (36.7 °C) (Oral)   Resp 18   Ht 1.676 m (5' 5.98\")   Wt 58.6 kg (129 lb 3.2 oz)   SpO2 99%   BMI 20.86 kg/m²       \"Have you been to the ER, urgent care clinic since your last visit?  Hospitalized since your last visit?\"    NO    “Have you seen or consulted any other health care providers outside of [facility] since your last visit?”    NO            Click Here for Release of Records Request  
  Pulmonary:      Effort: Pulmonary effort is normal.      Breath sounds: Normal breath sounds. No wheezing or rhonchi.   Abdominal:      General: Bowel sounds are normal. There is no distension.      Palpations: Abdomen is soft.      Tenderness: There is no abdominal tenderness.   Musculoskeletal:         General: No swelling or tenderness.      Cervical back: Neck supple.   Lymphadenopathy:      Cervical: No cervical adenopathy.   Skin:     General: Skin is warm.   Neurological:      General: No focal deficit present.      Mental Status: She is alert and oriented to person, place, and time. Mental status is at baseline.   Psychiatric:         Mood and Affect: Mood normal.         Behavior: Behavior normal.           Allergies   Allergen Reactions   • Metronidazole Itching     Prior to Visit Medications    Medication Sig Taking? Authorizing Provider   diclofenac sodium (VOLTAREN) 1 % GEL Apply 2 g topically 4 times daily Yes Iqra Stauffer MD   acetaminophen (TYLENOL 8 HOUR) 650 MG extended release tablet Take 1 tablet by mouth 2 times daily as needed for Pain Yes Iqra Stauffer MD   omeprazole (PRILOSEC) 20 MG delayed release capsule TAKE 1 CAPSULE BY MOUTH 2 TIMES DAILY (BEFORE MEALS). Yes Iqra Stauffer MD   donepezil (ARICEPT) 10 MG tablet BY MOUTH EVERY EVENING AFTER THE DINNER X2 WEEKS THEN 1 TABLET EVERY EVENING AFTER DINNER Yes ProviderJax MD   mirtazapine (REMERON) 7.5 MG tablet Take 1 tablet by mouth nightly Yes Iqra Stauffer MD   amLODIPine (NORVASC) 5 MG tablet Take 1 tablet by mouth daily Yes Iqra Stauffer MD   losartan-hydroCHLOROthiazide (HYZAAR) 50-12.5 MG per tablet TAKE 1 TABLET BY MOUTH EVERY DAY IN THE MORNING Yes Iqra Stauffer MD   potassium chloride (KLOR-CON M10) 10 MEQ extended release tablet Take 1 tablet by mouth 2 times daily Yes Iqra Stauffer MD   memantine (NAMENDA) 10 MG tablet Take 1 tablet by mouth 2 times daily Yes ProviderJax MD

## 2024-08-24 LAB
25(OH)D3+25(OH)D2 SERPL-MCNC: 59.3 NG/ML (ref 30–100)
ALBUMIN SERPL-MCNC: 4.5 G/DL (ref 3.7–4.7)
ALP SERPL-CCNC: 91 IU/L (ref 44–121)
ALT SERPL-CCNC: 23 IU/L (ref 0–32)
AST SERPL-CCNC: 26 IU/L (ref 0–40)
BASOPHILS # BLD AUTO: 0.1 X10E3/UL (ref 0–0.2)
BASOPHILS NFR BLD AUTO: 1 %
BILIRUB SERPL-MCNC: 0.3 MG/DL (ref 0–1.2)
BUN SERPL-MCNC: 30 MG/DL (ref 8–27)
BUN/CREAT SERPL: 20 (ref 12–28)
CALCIUM SERPL-MCNC: 10.2 MG/DL (ref 8.7–10.3)
CHLORIDE SERPL-SCNC: 99 MMOL/L (ref 96–106)
CO2 SERPL-SCNC: 30 MMOL/L (ref 20–29)
CREAT SERPL-MCNC: 1.53 MG/DL (ref 0.57–1)
EGFRCR SERPLBLD CKD-EPI 2021: 34 ML/MIN/1.73
EOSINOPHIL # BLD AUTO: 0.1 X10E3/UL (ref 0–0.4)
EOSINOPHIL NFR BLD AUTO: 1 %
ERYTHROCYTE [DISTWIDTH] IN BLOOD BY AUTOMATED COUNT: 13.6 % (ref 11.7–15.4)
GLOBULIN SER CALC-MCNC: 2.9 G/DL (ref 1.5–4.5)
GLUCOSE SERPL-MCNC: 111 MG/DL (ref 70–99)
HCT VFR BLD AUTO: 38.4 % (ref 34–46.6)
HGB BLD-MCNC: 12.6 G/DL (ref 11.1–15.9)
IMM GRANULOCYTES # BLD AUTO: 0 X10E3/UL (ref 0–0.1)
IMM GRANULOCYTES NFR BLD AUTO: 0 %
LYMPHOCYTES # BLD AUTO: 1.7 X10E3/UL (ref 0.7–3.1)
LYMPHOCYTES NFR BLD AUTO: 22 %
MCH RBC QN AUTO: 27.6 PG (ref 26.6–33)
MCHC RBC AUTO-ENTMCNC: 32.8 G/DL (ref 31.5–35.7)
MCV RBC AUTO: 84 FL (ref 79–97)
MONOCYTES # BLD AUTO: 0.4 X10E3/UL (ref 0.1–0.9)
MONOCYTES NFR BLD AUTO: 5 %
NEUTROPHILS # BLD AUTO: 5.5 X10E3/UL (ref 1.4–7)
NEUTROPHILS NFR BLD AUTO: 71 %
PLATELET # BLD AUTO: 239 X10E3/UL (ref 150–450)
POTASSIUM SERPL-SCNC: 3.6 MMOL/L (ref 3.5–5.2)
PROT SERPL-MCNC: 7.4 G/DL (ref 6–8.5)
RBC # BLD AUTO: 4.57 X10E6/UL (ref 3.77–5.28)
SODIUM SERPL-SCNC: 144 MMOL/L (ref 134–144)
WBC # BLD AUTO: 7.7 X10E3/UL (ref 3.4–10.8)

## 2024-09-05 RX ORDER — POTASSIUM CHLORIDE 750 MG/1
10 TABLET, EXTENDED RELEASE ORAL 2 TIMES DAILY
Qty: 180 TABLET | Refills: 1 | Status: SHIPPED | OUTPATIENT
Start: 2024-09-05

## 2024-09-22 PROBLEM — Z00.00 MEDICARE ANNUAL WELLNESS VISIT, SUBSEQUENT: Status: RESOLVED | Noted: 2024-08-23 | Resolved: 2024-09-22

## 2024-09-23 RX ORDER — ATORVASTATIN CALCIUM 10 MG/1
10 TABLET, FILM COATED ORAL NIGHTLY
Qty: 90 TABLET | Refills: 1 | Status: SHIPPED | OUTPATIENT
Start: 2024-09-23

## 2024-09-25 ENCOUNTER — HOSPITAL ENCOUNTER (OUTPATIENT)
Facility: HOSPITAL | Age: 84
Discharge: HOME | End: 2024-09-28
Attending: INTERNAL MEDICINE
Payer: MEDICARE

## 2024-09-25 DIAGNOSIS — M85.89 OSTEOPENIA OF MULTIPLE SITES: ICD-10-CM

## 2024-09-25 DIAGNOSIS — Z12.31 SCREENING MAMMOGRAM FOR HIGH-RISK PATIENT: ICD-10-CM

## 2024-09-25 PROCEDURE — 77067 SCR MAMMO BI INCL CAD: CPT

## 2024-09-25 PROCEDURE — 77080 DXA BONE DENSITY AXIAL: CPT

## 2024-09-28 DIAGNOSIS — F03.90 UNSPECIFIED DEMENTIA, UNSPECIFIED SEVERITY, WITHOUT BEHAVIORAL DISTURBANCE, PSYCHOTIC DISTURBANCE, MOOD DISTURBANCE, AND ANXIETY (HCC): ICD-10-CM

## 2024-09-29 DIAGNOSIS — F03.90 UNSPECIFIED DEMENTIA, UNSPECIFIED SEVERITY, WITHOUT BEHAVIORAL DISTURBANCE, PSYCHOTIC DISTURBANCE, MOOD DISTURBANCE, AND ANXIETY (HCC): ICD-10-CM

## 2024-09-30 RX ORDER — DONEPEZIL HYDROCHLORIDE 10 MG/1
TABLET, FILM COATED ORAL
Qty: 90 TABLET | Refills: 1 | OUTPATIENT
Start: 2024-09-30

## 2024-09-30 RX ORDER — MEMANTINE HYDROCHLORIDE 10 MG/1
TABLET ORAL
Qty: 180 TABLET | Refills: 1 | OUTPATIENT
Start: 2024-09-30

## 2024-10-03 RX ORDER — LOSARTAN POTASSIUM AND HYDROCHLOROTHIAZIDE 12.5; 5 MG/1; MG/1
1 TABLET ORAL EVERY MORNING
Qty: 90 TABLET | Refills: 2 | Status: SHIPPED | OUTPATIENT
Start: 2024-10-03

## 2024-10-09 DIAGNOSIS — M81.6 LOCALIZED OSTEOPOROSIS WITHOUT CURRENT PATHOLOGICAL FRACTURE: Primary | ICD-10-CM

## 2024-11-15 RX ORDER — AMLODIPINE BESYLATE 10 MG/1
10 TABLET ORAL DAILY
Qty: 90 TABLET | Refills: 1 | Status: SHIPPED | OUTPATIENT
Start: 2024-11-15

## 2024-11-18 RX ORDER — MIRTAZAPINE 7.5 MG/1
7.5 TABLET, FILM COATED ORAL NIGHTLY
Qty: 90 TABLET | Refills: 2 | Status: SHIPPED | OUTPATIENT
Start: 2024-11-18

## 2024-11-18 RX ORDER — AMLODIPINE BESYLATE 5 MG/1
5 TABLET ORAL DAILY
Qty: 90 TABLET | Refills: 2 | Status: SHIPPED | OUTPATIENT
Start: 2024-11-18

## 2024-11-21 ENCOUNTER — OFFICE VISIT (OUTPATIENT)
Age: 84
End: 2024-11-21
Payer: MEDICARE

## 2024-11-21 VITALS
SYSTOLIC BLOOD PRESSURE: 136 MMHG | TEMPERATURE: 97.8 F | HEIGHT: 65 IN | RESPIRATION RATE: 16 BRPM | DIASTOLIC BLOOD PRESSURE: 76 MMHG | BODY MASS INDEX: 21.86 KG/M2 | WEIGHT: 131.2 LBS | HEART RATE: 125 BPM

## 2024-11-21 DIAGNOSIS — M81.6 LOCALIZED OSTEOPOROSIS WITHOUT CURRENT PATHOLOGICAL FRACTURE: ICD-10-CM

## 2024-11-21 DIAGNOSIS — M81.6 LOCALIZED OSTEOPOROSIS WITHOUT CURRENT PATHOLOGICAL FRACTURE: Primary | ICD-10-CM

## 2024-11-21 PROCEDURE — G8484 FLU IMMUNIZE NO ADMIN: HCPCS | Performed by: STUDENT IN AN ORGANIZED HEALTH CARE EDUCATION/TRAINING PROGRAM

## 2024-11-21 PROCEDURE — G8399 PT W/DXA RESULTS DOCUMENT: HCPCS | Performed by: STUDENT IN AN ORGANIZED HEALTH CARE EDUCATION/TRAINING PROGRAM

## 2024-11-21 PROCEDURE — G8428 CUR MEDS NOT DOCUMENT: HCPCS | Performed by: STUDENT IN AN ORGANIZED HEALTH CARE EDUCATION/TRAINING PROGRAM

## 2024-11-21 PROCEDURE — 3078F DIAST BP <80 MM HG: CPT | Performed by: STUDENT IN AN ORGANIZED HEALTH CARE EDUCATION/TRAINING PROGRAM

## 2024-11-21 PROCEDURE — 1090F PRES/ABSN URINE INCON ASSESS: CPT | Performed by: STUDENT IN AN ORGANIZED HEALTH CARE EDUCATION/TRAINING PROGRAM

## 2024-11-21 PROCEDURE — 1125F AMNT PAIN NOTED PAIN PRSNT: CPT | Performed by: STUDENT IN AN ORGANIZED HEALTH CARE EDUCATION/TRAINING PROGRAM

## 2024-11-21 PROCEDURE — 3075F SYST BP GE 130 - 139MM HG: CPT | Performed by: STUDENT IN AN ORGANIZED HEALTH CARE EDUCATION/TRAINING PROGRAM

## 2024-11-21 PROCEDURE — 1123F ACP DISCUSS/DSCN MKR DOCD: CPT | Performed by: STUDENT IN AN ORGANIZED HEALTH CARE EDUCATION/TRAINING PROGRAM

## 2024-11-21 PROCEDURE — G8420 CALC BMI NORM PARAMETERS: HCPCS | Performed by: STUDENT IN AN ORGANIZED HEALTH CARE EDUCATION/TRAINING PROGRAM

## 2024-11-21 PROCEDURE — 99213 OFFICE O/P EST LOW 20 MIN: CPT | Performed by: STUDENT IN AN ORGANIZED HEALTH CARE EDUCATION/TRAINING PROGRAM

## 2024-11-21 PROCEDURE — 1036F TOBACCO NON-USER: CPT | Performed by: STUDENT IN AN ORGANIZED HEALTH CARE EDUCATION/TRAINING PROGRAM

## 2024-11-21 RX ORDER — PSEUDOEPHEDRINE HCL 30 MG
500 TABLET ORAL DAILY
Qty: 120 TABLET | Refills: 5 | Status: SHIPPED | OUTPATIENT
Start: 2024-11-21

## 2024-11-21 RX ORDER — SWAB
400 SWAB, NON-MEDICATED MISCELLANEOUS DAILY
Qty: 90 CAPSULE | Refills: 1 | Status: SHIPPED | OUTPATIENT
Start: 2024-11-21

## 2024-11-21 ASSESSMENT — ENCOUNTER SYMPTOMS
EYES NEGATIVE: 1
GASTROINTESTINAL NEGATIVE: 1
EYE DISCHARGE: 0
NAUSEA: 0
VOICE CHANGE: 0
CONSTIPATION: 0
BACK PAIN: 0
DIARRHEA: 0
ABDOMINAL PAIN: 0
RESPIRATORY NEGATIVE: 1
TROUBLE SWALLOWING: 0
SORE THROAT: 0
VOMITING: 0

## 2024-11-21 NOTE — PROGRESS NOTES
Date of Service: 11/21/2024    Primary Care Physician: Iqra Stauffer MD.  Referring physician: Xiomy   Provider: Jean Claude Prado MD        Reason for the visit:      History of present illness:   The history is provided by the patient. No  was used. Accuracy of the patient data is excellent  Paloma Phillips is a very pleasant 84 y.o. female with PMH of Dementia seen today for osteoporosis.     Paloma Phillips was diagnosed with osteoporosis recently.   She is not on Ca and Vit D supplementation at this time.   She denied any h/o fragility fractures or recent fall   Age of menopause: in early 50s.   Patient denied personal or family history of kidney stone  She lost few inches over the past 5 years   The patient diet doesn't include any dairy products, except occasional cheese   Patient reported inadequate Sun light exposure.     There is no hx smoking cigarettes, alcohol, rheumatoid arthritis, hx steroid intake in the past , no hx bone fractures in the past , no family hx osteoporosis .     DEXA scan done on 09/25/2024 showed the following:     Findings:     Femoral Neck Left: .  Bone mineral density (gm/cm2): 0.617.  % of peak bone mass: .  % for age matched controls: .  T-score: -2.3.  Z-score: -0.5.     Total Hip Left: .  Bone mineral density (gm/cm2): 0.793.  % of peak bone mass: .  % for age matched controls: .  T-score: -1.5.  Z-score: 0.1.     Lumbar Spine: L1-2.  Bone mineral density (gm/cm2): 0.790.  % of peak bone mass: 73.  % for age matched controls: 104.  T-score: -2.6.  Z-score: 0.2.    PAST MEDICAL HISTORY   Past Medical History:   Diagnosis Date    Anemia 11/04/2023    Anxiety     Asthma     Chronic renal disease, stage III (HCC) 05/29/2022    Hypercholesterolemia     Hypertension     Osteoarthritis, unspecified osteoarthritis type, unspecified site 2/7/2023    Osteoporosis     Situational depression 9/22/2020       PAST SURGICAL HISTORY   Past Surgical History:

## 2024-11-21 NOTE — PROGRESS NOTES
\"Have you been to the ER, urgent care clinic since your last visit?  Hospitalized since your last visit?\"    NO    “Have you seen or consulted any other health care providers outside our system since your last visit?”    NO    Chief Complaint   Patient presents with    New Patient    Osteoporosis     /76 (Site: Right Upper Arm, Position: Sitting, Cuff Size: Medium Adult)   Pulse (!) 125   Temp 97.8 °F (36.6 °C) (Temporal)   Resp 16   Ht 1.651 m (5' 5\")   Wt 59.5 kg (131 lb 3.2 oz)   BMI 21.83 kg/m²

## 2024-11-22 LAB
ALBUMIN SERPL-MCNC: 4.4 G/DL (ref 3.7–4.7)
ALP SERPL-CCNC: 104 IU/L (ref 44–121)
ALT SERPL-CCNC: 21 IU/L (ref 0–32)
AST SERPL-CCNC: 21 IU/L (ref 0–40)
BILIRUB SERPL-MCNC: 0.3 MG/DL (ref 0–1.2)
BUN SERPL-MCNC: 22 MG/DL (ref 8–27)
BUN/CREAT SERPL: 16 (ref 12–28)
CALCIUM SERPL-MCNC: 10.3 MG/DL (ref 8.7–10.3)
CHLORIDE SERPL-SCNC: 102 MMOL/L (ref 96–106)
CO2 SERPL-SCNC: 29 MMOL/L (ref 20–29)
CREAT SERPL-MCNC: 1.36 MG/DL (ref 0.57–1)
EGFRCR SERPLBLD CKD-EPI 2021: 39 ML/MIN/1.73
GLOBULIN SER CALC-MCNC: 2.8 G/DL (ref 1.5–4.5)
GLUCOSE SERPL-MCNC: 89 MG/DL (ref 70–99)
MAGNESIUM SERPL-MCNC: 2.1 MG/DL (ref 1.6–2.3)
PHOSPHATE SERPL-MCNC: 3.4 MG/DL (ref 3–4.3)
POTASSIUM SERPL-SCNC: 3.5 MMOL/L (ref 3.5–5.2)
PROT SERPL-MCNC: 7.2 G/DL (ref 6–8.5)
PTH-INTACT SERPL-MCNC: 27 PG/ML (ref 15–65)
SODIUM SERPL-SCNC: 145 MMOL/L (ref 134–144)

## 2024-11-23 LAB — ALP BONE SERPL-MCNC: 18.2 UG/L

## 2024-11-30 LAB — COLLAGEN CTX SERPL-MCNC: 541 PG/ML

## 2024-12-01 LAB
ALBUMIN SERPL ELPH-MCNC: 3.9 G/DL (ref 2.9–4.4)
ALBUMIN/GLOB SERPL: 1.2 {RATIO} (ref 0.7–1.7)
ALPHA1 GLOB SERPL ELPH-MCNC: 0.3 G/DL (ref 0–0.4)
ALPHA2 GLOB SERPL ELPH-MCNC: 0.7 G/DL (ref 0.4–1)
B-GLOBULIN SERPL ELPH-MCNC: 1.1 G/DL (ref 0.7–1.3)
GAMMA GLOB SERPL ELPH-MCNC: 1.1 G/DL (ref 0.4–1.8)
GLOBULIN SER CALC-MCNC: 3.3 G/DL (ref 2.2–3.9)
LABORATORY COMMENT REPORT: NORMAL
M PROTEIN SERPL ELPH-MCNC: NORMAL G/DL

## 2024-12-23 ENCOUNTER — OFFICE VISIT (OUTPATIENT)
Age: 84
End: 2024-12-23
Payer: MEDICARE

## 2024-12-23 VITALS
SYSTOLIC BLOOD PRESSURE: 115 MMHG | WEIGHT: 128.6 LBS | DIASTOLIC BLOOD PRESSURE: 64 MMHG | HEIGHT: 66 IN | TEMPERATURE: 97.5 F | RESPIRATION RATE: 16 BRPM | BODY MASS INDEX: 20.67 KG/M2 | HEART RATE: 68 BPM

## 2024-12-23 DIAGNOSIS — M81.0 OSTEOPOROSIS WITHOUT CURRENT PATHOLOGICAL FRACTURE, UNSPECIFIED OSTEOPOROSIS TYPE: Primary | ICD-10-CM

## 2024-12-23 DIAGNOSIS — M81.0 OSTEOPOROSIS WITHOUT CURRENT PATHOLOGICAL FRACTURE, UNSPECIFIED OSTEOPOROSIS TYPE: ICD-10-CM

## 2024-12-23 PROCEDURE — 3078F DIAST BP <80 MM HG: CPT | Performed by: STUDENT IN AN ORGANIZED HEALTH CARE EDUCATION/TRAINING PROGRAM

## 2024-12-23 PROCEDURE — 99214 OFFICE O/P EST MOD 30 MIN: CPT | Performed by: STUDENT IN AN ORGANIZED HEALTH CARE EDUCATION/TRAINING PROGRAM

## 2024-12-23 PROCEDURE — 1123F ACP DISCUSS/DSCN MKR DOCD: CPT | Performed by: STUDENT IN AN ORGANIZED HEALTH CARE EDUCATION/TRAINING PROGRAM

## 2024-12-23 PROCEDURE — G8420 CALC BMI NORM PARAMETERS: HCPCS | Performed by: STUDENT IN AN ORGANIZED HEALTH CARE EDUCATION/TRAINING PROGRAM

## 2024-12-23 PROCEDURE — 1126F AMNT PAIN NOTED NONE PRSNT: CPT | Performed by: STUDENT IN AN ORGANIZED HEALTH CARE EDUCATION/TRAINING PROGRAM

## 2024-12-23 PROCEDURE — G8484 FLU IMMUNIZE NO ADMIN: HCPCS | Performed by: STUDENT IN AN ORGANIZED HEALTH CARE EDUCATION/TRAINING PROGRAM

## 2024-12-23 PROCEDURE — 1090F PRES/ABSN URINE INCON ASSESS: CPT | Performed by: STUDENT IN AN ORGANIZED HEALTH CARE EDUCATION/TRAINING PROGRAM

## 2024-12-23 PROCEDURE — G8427 DOCREV CUR MEDS BY ELIG CLIN: HCPCS | Performed by: STUDENT IN AN ORGANIZED HEALTH CARE EDUCATION/TRAINING PROGRAM

## 2024-12-23 PROCEDURE — 3074F SYST BP LT 130 MM HG: CPT | Performed by: STUDENT IN AN ORGANIZED HEALTH CARE EDUCATION/TRAINING PROGRAM

## 2024-12-23 PROCEDURE — 1036F TOBACCO NON-USER: CPT | Performed by: STUDENT IN AN ORGANIZED HEALTH CARE EDUCATION/TRAINING PROGRAM

## 2024-12-23 PROCEDURE — 1159F MED LIST DOCD IN RCRD: CPT | Performed by: STUDENT IN AN ORGANIZED HEALTH CARE EDUCATION/TRAINING PROGRAM

## 2024-12-23 PROCEDURE — G8399 PT W/DXA RESULTS DOCUMENT: HCPCS | Performed by: STUDENT IN AN ORGANIZED HEALTH CARE EDUCATION/TRAINING PROGRAM

## 2024-12-23 RX ORDER — ALENDRONATE SODIUM 70 MG/1
70 TABLET ORAL
Qty: 12 TABLET | Refills: 3 | Status: SHIPPED | OUTPATIENT
Start: 2024-12-23

## 2024-12-23 ASSESSMENT — ENCOUNTER SYMPTOMS
ABDOMINAL PAIN: 0
DIARRHEA: 0
EYES NEGATIVE: 1
GASTROINTESTINAL NEGATIVE: 1
EYE DISCHARGE: 0
TROUBLE SWALLOWING: 0
RESPIRATORY NEGATIVE: 1
VOMITING: 0
NAUSEA: 0
SORE THROAT: 0
VOICE CHANGE: 0
CONSTIPATION: 0
BACK PAIN: 0

## 2024-12-23 NOTE — PROGRESS NOTES
\"Have you been to the ER, urgent care clinic since your last visit?  Hospitalized since your last visit?\"    NO    “Have you seen or consulted any other health care providers outside our system since your last visit?”    NO    Chief Complaint   Patient presents with    Follow-up    Osteoporosis     /64 (Site: Right Upper Arm, Position: Sitting, Cuff Size: Medium Adult)   Pulse 68   Temp 97.5 °F (36.4 °C) (Temporal)   Resp 16   Ht 1.676 m (5' 5.98\")   Wt 58.3 kg (128 lb 9.6 oz)   BMI 20.77 kg/m²

## 2024-12-23 NOTE — PROGRESS NOTES
Date of Service: 12/23/2024    Primary Care Physician: Iqra Stauffer MD.  Referring physician: No ref. provider found   Provider: Jean Claude Prado MD        Reason for the visit:      History of present illness:   Paloma Phillips is a very pleasant 84 y.o. female with PMH of Dementia seen today for follow up of osteoporosis.     Interval hx-   The patient is here with her spouse. She has completed all the labs . As per her spouse she has been taking all her medications including calcium tablets and vitamin D 400 IU daily.   There is no hx bone pain or muscle pain. No interim bone fractures. The patient does not have any hx heartburn or chest pain ro abdominal pain. However the patient is taking omeprazole for GERD.       Background hx-   Paloma Phillips was diagnosed with osteoporosis recently.   She is not on Ca and Vit D supplementation at this time.   She denied any h/o fragility fractures or recent fall   Age of menopause: in early 50s.   Patient denied personal or family history of kidney stone  She lost few inches over the past 5 years   The patient diet doesn't include any dairy products, except occasional cheese   Patient reported inadequate Sun light exposure.     There is no hx smoking cigarettes, alcohol, rheumatoid arthritis, hx steroid intake in the past , no hx bone fractures in the past , no family hx osteoporosis .     DEXA scan done on 09/25/2024 showed the following:     Findings:     Femoral Neck Left: .  Bone mineral density (gm/cm2): 0.617.  % of peak bone mass: .  % for age matched controls: .  T-score: -2.3.  Z-score: -0.5.     Total Hip Left: .  Bone mineral density (gm/cm2): 0.793.  % of peak bone mass: .  % for age matched controls: .  T-score: -1.5.  Z-score: 0.1.     Lumbar Spine: L1-2.  Bone mineral density (gm/cm2): 0.790.  % of peak bone mass: 73.  % for age matched controls: 104.  T-score: -2.6.  Z-score: 0.2.    PAST MEDICAL HISTORY   Past Medical History:   Diagnosis Date

## 2025-01-07 ENCOUNTER — OFFICE VISIT (OUTPATIENT)
Facility: CLINIC | Age: 85
End: 2025-01-07
Payer: MEDICARE

## 2025-01-07 VITALS
HEART RATE: 60 BPM | HEIGHT: 66 IN | DIASTOLIC BLOOD PRESSURE: 70 MMHG | RESPIRATION RATE: 18 BRPM | WEIGHT: 134.6 LBS | SYSTOLIC BLOOD PRESSURE: 126 MMHG | OXYGEN SATURATION: 91 % | TEMPERATURE: 98 F | BODY MASS INDEX: 21.63 KG/M2

## 2025-01-07 DIAGNOSIS — M15.0 PRIMARY OSTEOARTHRITIS INVOLVING MULTIPLE JOINTS: ICD-10-CM

## 2025-01-07 DIAGNOSIS — F03.90 DEMENTIA WITHOUT BEHAVIORAL DISTURBANCE (HCC): ICD-10-CM

## 2025-01-07 DIAGNOSIS — F41.8 SITUATIONAL ANXIETY: ICD-10-CM

## 2025-01-07 DIAGNOSIS — E78.00 PURE HYPERCHOLESTEROLEMIA: ICD-10-CM

## 2025-01-07 DIAGNOSIS — M85.89 OSTEOPENIA OF MULTIPLE SITES: ICD-10-CM

## 2025-01-07 DIAGNOSIS — M54.16 LUMBAR RADICULOPATHY: ICD-10-CM

## 2025-01-07 DIAGNOSIS — N18.31 STAGE 3A CHRONIC KIDNEY DISEASE (HCC): ICD-10-CM

## 2025-01-07 DIAGNOSIS — I10 PRIMARY HYPERTENSION: Primary | ICD-10-CM

## 2025-01-07 PROCEDURE — 3074F SYST BP LT 130 MM HG: CPT | Performed by: INTERNAL MEDICINE

## 2025-01-07 PROCEDURE — 1036F TOBACCO NON-USER: CPT | Performed by: INTERNAL MEDICINE

## 2025-01-07 PROCEDURE — G8427 DOCREV CUR MEDS BY ELIG CLIN: HCPCS | Performed by: INTERNAL MEDICINE

## 2025-01-07 PROCEDURE — 1123F ACP DISCUSS/DSCN MKR DOCD: CPT | Performed by: INTERNAL MEDICINE

## 2025-01-07 PROCEDURE — G8399 PT W/DXA RESULTS DOCUMENT: HCPCS | Performed by: INTERNAL MEDICINE

## 2025-01-07 PROCEDURE — 1159F MED LIST DOCD IN RCRD: CPT | Performed by: INTERNAL MEDICINE

## 2025-01-07 PROCEDURE — 1126F AMNT PAIN NOTED NONE PRSNT: CPT | Performed by: INTERNAL MEDICINE

## 2025-01-07 PROCEDURE — 1160F RVW MEDS BY RX/DR IN RCRD: CPT | Performed by: INTERNAL MEDICINE

## 2025-01-07 PROCEDURE — 99214 OFFICE O/P EST MOD 30 MIN: CPT | Performed by: INTERNAL MEDICINE

## 2025-01-07 PROCEDURE — M1308 PR FLU IMMUNIZE NO ADMIN: HCPCS | Performed by: INTERNAL MEDICINE

## 2025-01-07 PROCEDURE — 1090F PRES/ABSN URINE INCON ASSESS: CPT | Performed by: INTERNAL MEDICINE

## 2025-01-07 PROCEDURE — G8420 CALC BMI NORM PARAMETERS: HCPCS | Performed by: INTERNAL MEDICINE

## 2025-01-07 PROCEDURE — 3078F DIAST BP <80 MM HG: CPT | Performed by: INTERNAL MEDICINE

## 2025-01-07 ASSESSMENT — PATIENT HEALTH QUESTIONNAIRE - PHQ9
10. IF YOU CHECKED OFF ANY PROBLEMS, HOW DIFFICULT HAVE THESE PROBLEMS MADE IT FOR YOU TO DO YOUR WORK, TAKE CARE OF THINGS AT HOME, OR GET ALONG WITH OTHER PEOPLE: NOT DIFFICULT AT ALL
6. FEELING BAD ABOUT YOURSELF - OR THAT YOU ARE A FAILURE OR HAVE LET YOURSELF OR YOUR FAMILY DOWN: NOT AT ALL
4. FEELING TIRED OR HAVING LITTLE ENERGY: NOT AT ALL
8. MOVING OR SPEAKING SO SLOWLY THAT OTHER PEOPLE COULD HAVE NOTICED. OR THE OPPOSITE, BEING SO FIGETY OR RESTLESS THAT YOU HAVE BEEN MOVING AROUND A LOT MORE THAN USUAL: NOT AT ALL
SUM OF ALL RESPONSES TO PHQ QUESTIONS 1-9: 0
1. LITTLE INTEREST OR PLEASURE IN DOING THINGS: NOT AT ALL
SUM OF ALL RESPONSES TO PHQ QUESTIONS 1-9: 0
SUM OF ALL RESPONSES TO PHQ9 QUESTIONS 1 & 2: 0
SUM OF ALL RESPONSES TO PHQ QUESTIONS 1-9: 0
SUM OF ALL RESPONSES TO PHQ QUESTIONS 1-9: 0
9. THOUGHTS THAT YOU WOULD BE BETTER OFF DEAD, OR OF HURTING YOURSELF: NOT AT ALL
5. POOR APPETITE OR OVEREATING: NOT AT ALL
2. FEELING DOWN, DEPRESSED OR HOPELESS: NOT AT ALL
7. TROUBLE CONCENTRATING ON THINGS, SUCH AS READING THE NEWSPAPER OR WATCHING TELEVISION: NOT AT ALL
3. TROUBLE FALLING OR STAYING ASLEEP: NOT AT ALL

## 2025-01-07 ASSESSMENT — ENCOUNTER SYMPTOMS
ALLERGIC/IMMUNOLOGIC NEGATIVE: 1
GASTROINTESTINAL NEGATIVE: 1
RESPIRATORY NEGATIVE: 1

## 2025-01-07 NOTE — PROGRESS NOTES
Paloma Phillips (: 1940) is a 84 y.o. female, Established patient patient, here for evaluation of the following chief complaint(s):  Follow-up Chronic Condition         ASSESSMENT/PLAN:  Below is the assessment and plan developed based on review of pertinent history, physical exam, labs, studies, and medications.      1. Primary hypertension  2. Dementia without behavioral disturbance (HCC)  3. Stage 3a chronic kidney disease (HCC)  4. Situational anxiety  5. Pure hypercholesterolemia  6. Osteopenia of multiple sites  7. Lumbar radiculopathy  8. Primary osteoarthritis involving multiple joints      Hypertension, well-controlled, continue current dose of amlodipine and losartan HCTZ and metoprolol ER.    She has consulted endocrinologist and does follow-up with endocrinologist.  She has osteoporosis.  She is getting oral alendronate with vitamin D3 and calcium.    Hypercholesteremia getting atorvastatin 10 mg at bedtime.  Diet low in greasy oily fried food.    History of low appetite with anxiety and weight loss in the past having dementia also not progressing and without behavioral problems.      She is on mirtazapine and responding very well and has some weight gain and very good appetite    For dementia she follows neurologist Dr. Page getting Aricept and Namenda generic brand    Hypokalemia getting potassium 10 mill equivalent twice a day.    GERD getting omeprazole.    Continue Centrum Silver containing vitamin D3.    No depression.  No anxiety now.    She has done recent labs and labs are ordered so I will not order labs to prevent duplication.  Her  is also very happy and she celebrated her [place] and [place] '[place] day with her family members.    Return in about 5 months (around 2025) for follow for chronic condition.         SUBJECTIVE/OBJECTIVE:  HPI    Paloma Phillips having pleasant personality is brought by her  Mr. Phillips.  Her blood pressure is very well-controlled with

## 2025-01-07 NOTE — PROGRESS NOTES
Chief Complaint   Patient presents with    Follow-up Chronic Condition     /80 (Site: Right Upper Arm, Position: Sitting)   Pulse 73   Temp 98 °F (36.7 °C) (Oral)   Resp 18   Ht 1.676 m (5' 5.98\")   Wt 61.1 kg (134 lb 9.6 oz)   SpO2 91%   BMI 21.74 kg/m²       \"Have you been to the ER, urgent care clinic since your last visit?  Hospitalized since your last visit?\"    NO    “Have you seen or consulted any other health care providers outside our system since your last visit?”    Yes Dr Suarez 11/21/24 localized osteoporosis and on 12/23/24

## 2025-01-13 RX ORDER — POTASSIUM CHLORIDE 750 MG/1
10 TABLET, EXTENDED RELEASE ORAL 2 TIMES DAILY
Qty: 180 TABLET | Refills: 1 | Status: SHIPPED | OUTPATIENT
Start: 2025-01-13

## 2025-01-13 RX ORDER — ATORVASTATIN CALCIUM 10 MG/1
10 TABLET, FILM COATED ORAL NIGHTLY
Qty: 90 TABLET | Refills: 1 | Status: SHIPPED | OUTPATIENT
Start: 2025-01-13

## 2025-01-13 RX ORDER — SENNOSIDES 8.6 MG
650 CAPSULE ORAL 2 TIMES DAILY
Qty: 90 TABLET | Refills: 1 | Status: SHIPPED | OUTPATIENT
Start: 2025-01-13

## 2025-01-17 LAB
25(OH)D3+25(OH)D2 SERPL-MCNC: 50 NG/ML (ref 30–100)
ALBUMIN SERPL-MCNC: 4.6 G/DL (ref 3.7–4.7)
ALP SERPL-CCNC: 96 IU/L (ref 44–121)
ALT SERPL-CCNC: 32 IU/L (ref 0–32)
AST SERPL-CCNC: 28 IU/L (ref 0–40)
BILIRUB SERPL-MCNC: 0.3 MG/DL (ref 0–1.2)
BUN SERPL-MCNC: 22 MG/DL (ref 8–27)
BUN/CREAT SERPL: 17 (ref 12–28)
CALCIUM SERPL-MCNC: 9.8 MG/DL (ref 8.7–10.3)
CHLORIDE SERPL-SCNC: 100 MMOL/L (ref 96–106)
CO2 SERPL-SCNC: 27 MMOL/L (ref 20–29)
CREAT SERPL-MCNC: 1.33 MG/DL (ref 0.57–1)
EGFRCR SERPLBLD CKD-EPI 2021: 40 ML/MIN/1.73
GLOBULIN SER CALC-MCNC: 2.7 G/DL (ref 1.5–4.5)
GLUCOSE SERPL-MCNC: 85 MG/DL (ref 70–99)
POTASSIUM SERPL-SCNC: 3.8 MMOL/L (ref 3.5–5.2)
PROT SERPL-MCNC: 7.3 G/DL (ref 6–8.5)
SODIUM SERPL-SCNC: 142 MMOL/L (ref 134–144)

## 2025-02-20 RX ORDER — OMEPRAZOLE 20 MG/1
20 CAPSULE, DELAYED RELEASE ORAL
Qty: 180 CAPSULE | Refills: 1 | Status: SHIPPED | OUTPATIENT
Start: 2025-02-20

## 2025-03-16 ENCOUNTER — APPOINTMENT (OUTPATIENT)
Facility: HOSPITAL | Age: 85
End: 2025-03-16
Payer: MEDICARE

## 2025-03-16 ENCOUNTER — HOSPITAL ENCOUNTER (EMERGENCY)
Facility: HOSPITAL | Age: 85
Discharge: HOME | End: 2025-03-16
Attending: EMERGENCY MEDICINE
Payer: MEDICARE

## 2025-03-16 VITALS
HEIGHT: 66 IN | TEMPERATURE: 97.7 F | WEIGHT: 134 LBS | OXYGEN SATURATION: 100 % | RESPIRATION RATE: 16 BRPM | SYSTOLIC BLOOD PRESSURE: 121 MMHG | HEART RATE: 67 BPM | BODY MASS INDEX: 21.53 KG/M2 | DIASTOLIC BLOOD PRESSURE: 70 MMHG

## 2025-03-16 DIAGNOSIS — H81.11 BPPV (BENIGN PAROXYSMAL POSITIONAL VERTIGO), RIGHT: Primary | ICD-10-CM

## 2025-03-16 LAB
ALBUMIN SERPL-MCNC: 3.9 G/DL (ref 3.5–5)
ALBUMIN/GLOB SERPL: 1.1 (ref 1.1–2.2)
ALP SERPL-CCNC: 86 U/L (ref 45–117)
ALT SERPL-CCNC: 20 U/L (ref 12–78)
ANION GAP SERPL CALC-SCNC: 4 MMOL/L (ref 2–12)
APTT PPP: 28.1 SEC (ref 21.2–34.1)
AST SERPL W P-5'-P-CCNC: 40 U/L (ref 15–37)
BASOPHILS # BLD: 0.05 K/UL (ref 0–0.1)
BASOPHILS NFR BLD: 0.7 % (ref 0–1)
BILIRUB SERPL-MCNC: 0.7 MG/DL (ref 0.2–1)
BUN SERPL-MCNC: 20 MG/DL (ref 6–20)
BUN/CREAT SERPL: 13 (ref 12–20)
CA-I BLD-MCNC: 10.4 MG/DL (ref 8.5–10.1)
CHLORIDE SERPL-SCNC: 103 MMOL/L (ref 97–108)
CO2 SERPL-SCNC: 33 MMOL/L (ref 21–32)
CREAT SERPL-MCNC: 1.55 MG/DL (ref 0.55–1.02)
DIFFERENTIAL METHOD BLD: NORMAL
EKG ATRIAL RATE: 61 BPM
EKG DIAGNOSIS: NORMAL
EKG P AXIS: 86 DEGREES
EKG P-R INTERVAL: 136 MS
EKG Q-T INTERVAL: 428 MS
EKG QRS DURATION: 92 MS
EKG QTC CALCULATION (BAZETT): 430 MS
EKG R AXIS: 42 DEGREES
EKG T AXIS: 82 DEGREES
EKG VENTRICULAR RATE: 61 BPM
EOSINOPHIL # BLD: 0.16 K/UL (ref 0–0.4)
EOSINOPHIL NFR BLD: 2.1 % (ref 0–7)
ERYTHROCYTE [DISTWIDTH] IN BLOOD BY AUTOMATED COUNT: 14.4 % (ref 11.5–14.5)
GLOBULIN SER CALC-MCNC: 3.5 G/DL (ref 2–4)
GLUCOSE SERPL-MCNC: 102 MG/DL (ref 65–100)
HCT VFR BLD AUTO: 38.3 % (ref 35–47)
HGB BLD-MCNC: 12.3 G/DL (ref 11.5–16)
IMM GRANULOCYTES # BLD AUTO: 0.01 K/UL (ref 0–0.04)
IMM GRANULOCYTES NFR BLD AUTO: 0.1 % (ref 0–0.5)
INR PPP: 1 (ref 0.9–1.1)
LYMPHOCYTES # BLD: 1.92 K/UL (ref 0.8–3.5)
LYMPHOCYTES NFR BLD: 25.3 % (ref 12–49)
MAGNESIUM SERPL-MCNC: 2 MG/DL (ref 1.6–2.4)
MCH RBC QN AUTO: 26.7 PG (ref 26–34)
MCHC RBC AUTO-ENTMCNC: 32.1 G/DL (ref 30–36.5)
MCV RBC AUTO: 83.1 FL (ref 80–99)
MONOCYTES # BLD: 0.41 K/UL (ref 0–1)
MONOCYTES NFR BLD: 5.4 % (ref 5–13)
NEUTS SEG # BLD: 5.05 K/UL (ref 1.8–8)
NEUTS SEG NFR BLD: 66.4 % (ref 32–75)
NRBC # BLD: 0 K/UL (ref 0–0.01)
NRBC BLD-RTO: 0 PER 100 WBC
PLATELET # BLD AUTO: 229 K/UL (ref 150–400)
PMV BLD AUTO: 10.3 FL (ref 8.9–12.9)
POTASSIUM SERPL-SCNC: 3.8 MMOL/L (ref 3.5–5.1)
PROT SERPL-MCNC: 7.4 G/DL (ref 6.4–8.2)
PROTHROMBIN TIME: 13.7 SEC (ref 11.9–14.6)
RBC # BLD AUTO: 4.61 M/UL (ref 3.8–5.2)
SODIUM SERPL-SCNC: 140 MMOL/L (ref 136–145)
THERAPEUTIC RANGE: NORMAL SEC (ref 82–109)
TROPONIN I SERPL HS-MCNC: 13 NG/L (ref 0–51)
WBC # BLD AUTO: 7.6 K/UL (ref 3.6–11)

## 2025-03-16 PROCEDURE — 36415 COLL VENOUS BLD VENIPUNCTURE: CPT

## 2025-03-16 PROCEDURE — 99285 EMERGENCY DEPT VISIT HI MDM: CPT

## 2025-03-16 PROCEDURE — 83735 ASSAY OF MAGNESIUM: CPT

## 2025-03-16 PROCEDURE — 6370000000 HC RX 637 (ALT 250 FOR IP): Performed by: EMERGENCY MEDICINE

## 2025-03-16 PROCEDURE — 85025 COMPLETE CBC W/AUTO DIFF WBC: CPT

## 2025-03-16 PROCEDURE — 80053 COMPREHEN METABOLIC PANEL: CPT

## 2025-03-16 PROCEDURE — 85610 PROTHROMBIN TIME: CPT

## 2025-03-16 PROCEDURE — 6360000002 HC RX W HCPCS: Performed by: EMERGENCY MEDICINE

## 2025-03-16 PROCEDURE — 93005 ELECTROCARDIOGRAM TRACING: CPT | Performed by: EMERGENCY MEDICINE

## 2025-03-16 PROCEDURE — 85730 THROMBOPLASTIN TIME PARTIAL: CPT

## 2025-03-16 PROCEDURE — 96374 THER/PROPH/DIAG INJ IV PUSH: CPT

## 2025-03-16 PROCEDURE — 71045 X-RAY EXAM CHEST 1 VIEW: CPT

## 2025-03-16 PROCEDURE — 84484 ASSAY OF TROPONIN QUANT: CPT

## 2025-03-16 PROCEDURE — 70450 CT HEAD/BRAIN W/O DYE: CPT

## 2025-03-16 RX ORDER — ONDANSETRON 4 MG/1
4 TABLET, ORALLY DISINTEGRATING ORAL 3 TIMES DAILY PRN
Qty: 21 TABLET | Refills: 0 | Status: SHIPPED | OUTPATIENT
Start: 2025-03-16

## 2025-03-16 RX ORDER — MECLIZINE HYDROCHLORIDE 25 MG/1
25 TABLET ORAL 3 TIMES DAILY PRN
Qty: 15 TABLET | Refills: 0 | Status: SHIPPED | OUTPATIENT
Start: 2025-03-16 | End: 2025-03-26

## 2025-03-16 RX ORDER — ONDANSETRON 2 MG/ML
4 INJECTION INTRAMUSCULAR; INTRAVENOUS ONCE
Status: COMPLETED | OUTPATIENT
Start: 2025-03-16 | End: 2025-03-16

## 2025-03-16 RX ORDER — MECLIZINE HYDROCHLORIDE 25 MG/1
25 TABLET ORAL
Status: COMPLETED | OUTPATIENT
Start: 2025-03-16 | End: 2025-03-16

## 2025-03-16 RX ADMIN — ONDANSETRON 4 MG: 2 INJECTION, SOLUTION INTRAMUSCULAR; INTRAVENOUS at 11:06

## 2025-03-16 RX ADMIN — MECLIZINE HYDROCHLORIDE 25 MG: 25 TABLET ORAL at 11:05

## 2025-03-16 ASSESSMENT — PAIN - FUNCTIONAL ASSESSMENT
PAIN_FUNCTIONAL_ASSESSMENT: NONE - DENIES PAIN
PAIN_FUNCTIONAL_ASSESSMENT: NONE - DENIES PAIN

## 2025-03-16 ASSESSMENT — LIFESTYLE VARIABLES
HOW OFTEN DO YOU HAVE A DRINK CONTAINING ALCOHOL: NEVER
HOW MANY STANDARD DRINKS CONTAINING ALCOHOL DO YOU HAVE ON A TYPICAL DAY: PATIENT DOES NOT DRINK

## 2025-03-16 ASSESSMENT — PAIN SCALES - GENERAL: PAINLEVEL_OUTOF10: 0

## 2025-03-16 NOTE — ED TRIAGE NOTES
Dizziness and loss of balance approx 0930.  Hx of Alzheimer's. Information received from  and Daughter. Juancarlos JOSUE bedside for assessment. Family endorses multiple episodes yesterday.

## 2025-03-16 NOTE — DISCHARGE INSTRUCTIONS
.    Thank you for choosing our Emergency Department for your care.  It is our privilege to care for you in your time of need.  In the next several days, you may receive a survey via email or mailed to your home about your experience with our team.  We would greatly appreciate you taking a few minutes to complete the survey, as we use this information to learn what we have done well and what we could be doing better. Thank you for trusting us with your care!    Below you will find a list of your tests from today's visit.   Labs and Radiology Studies  Recent Results (from the past 12 hours)   CBC with Auto Differential    Collection Time: 03/16/25 10:59 AM   Result Value Ref Range    WBC 7.6 3.6 - 11.0 K/uL    RBC 4.61 3.80 - 5.20 M/uL    Hemoglobin 12.3 11.5 - 16.0 g/dL    Hematocrit 38.3 35.0 - 47.0 %    MCV 83.1 80.0 - 99.0 FL    MCH 26.7 26.0 - 34.0 PG    MCHC 32.1 30.0 - 36.5 g/dL    RDW 14.4 11.5 - 14.5 %    Platelets 229 150 - 400 K/uL    MPV 10.3 8.9 - 12.9 FL    Nucleated RBCs 0.0 0.0  WBC    nRBC 0.00 0.00 - 0.01 K/uL    Neutrophils % 66.4 32.0 - 75.0 %    Lymphocytes % 25.3 12.0 - 49.0 %    Monocytes % 5.4 5.0 - 13.0 %    Eosinophils % 2.1 0.0 - 7.0 %    Basophils % 0.7 0.0 - 1.0 %    Immature Granulocytes % 0.1 0 - 0.5 %    Neutrophils Absolute 5.05 1.80 - 8.00 K/UL    Lymphocytes Absolute 1.92 0.80 - 3.50 K/UL    Monocytes Absolute 0.41 0.00 - 1.00 K/UL    Eosinophils Absolute 0.16 0.00 - 0.40 K/UL    Basophils Absolute 0.05 0.00 - 0.10 K/UL    Immature Granulocytes Absolute 0.01 0.00 - 0.04 K/UL    Differential Type AUTOMATED     Comprehensive Metabolic Panel    Collection Time: 03/16/25 10:59 AM   Result Value Ref Range    Sodium 140 136 - 145 mmol/L    Potassium 3.8 3.5 - 5.1 mmol/L    Chloride 103 97 - 108 mmol/L    CO2 33 (H) 21 - 32 mmol/L    Anion Gap 4 2 - 12 mmol/L    Glucose 102 (H) 65 - 100 mg/dL    BUN 20 6 - 20 mg/dL    Creatinine 1.55 (H) 0.55 - 1.02 mg/dL    BUN/Creatinine Ratio 13

## 2025-03-16 NOTE — ED PROVIDER NOTES
release tablet  Commonly known as: TOPROL XL     mirtazapine 7.5 MG tablet  Commonly known as: REMERON  TAKE 1 TABLET BY MOUTH EVERY DAY AT NIGHT     omeprazole 20 MG delayed release capsule  Commonly known as: PRILOSEC  TAKE 1 CAPSULE BY MOUTH 2 TIMES DAILY (BEFORE MEALS).               Where to Get Your Medications        These medications were sent to [facility]/pharmacy [id number] [place] [address] - [address] [facility] - P [phone] - F [phone]  [address] [facility] [address]      Phone: [phone]   meclizine 25 MG tablet  ondansetron 4 MG disintegrating tablet           DISCONTINUED MEDICATIONS:  Discharge Medication List as of 3/16/2025  1:37 PM          I am the Primary Clinician of Record. Lit Bauer MD (electronically signed)    (Please note that parts of this dictation were completed with voice recognition software. Quite often unanticipated grammatical, syntax, homophones, and other interpretive errors are inadvertently transcribed by the computer software. Please disregards these errors. Please excuse any errors that have escaped final proofreading.)      Lit Bauer MD  03/16/25 0370

## 2025-03-24 ENCOUNTER — OFFICE VISIT (OUTPATIENT)
Age: 85
End: 2025-03-24
Payer: MEDICARE

## 2025-03-24 VITALS
HEART RATE: 66 BPM | HEIGHT: 66 IN | BODY MASS INDEX: 20.57 KG/M2 | RESPIRATION RATE: 16 BRPM | WEIGHT: 128 LBS | TEMPERATURE: 98.1 F | DIASTOLIC BLOOD PRESSURE: 71 MMHG | SYSTOLIC BLOOD PRESSURE: 105 MMHG | OXYGEN SATURATION: 98 %

## 2025-03-24 DIAGNOSIS — M81.0 AGE-RELATED OSTEOPOROSIS WITHOUT CURRENT PATHOLOGICAL FRACTURE: Primary | ICD-10-CM

## 2025-03-24 DIAGNOSIS — M81.0 AGE-RELATED OSTEOPOROSIS WITHOUT CURRENT PATHOLOGICAL FRACTURE: ICD-10-CM

## 2025-03-24 PROCEDURE — 99204 OFFICE O/P NEW MOD 45 MIN: CPT | Performed by: STUDENT IN AN ORGANIZED HEALTH CARE EDUCATION/TRAINING PROGRAM

## 2025-03-24 PROCEDURE — 1036F TOBACCO NON-USER: CPT | Performed by: STUDENT IN AN ORGANIZED HEALTH CARE EDUCATION/TRAINING PROGRAM

## 2025-03-24 PROCEDURE — 1126F AMNT PAIN NOTED NONE PRSNT: CPT | Performed by: STUDENT IN AN ORGANIZED HEALTH CARE EDUCATION/TRAINING PROGRAM

## 2025-03-24 PROCEDURE — G8399 PT W/DXA RESULTS DOCUMENT: HCPCS | Performed by: STUDENT IN AN ORGANIZED HEALTH CARE EDUCATION/TRAINING PROGRAM

## 2025-03-24 PROCEDURE — G8427 DOCREV CUR MEDS BY ELIG CLIN: HCPCS | Performed by: STUDENT IN AN ORGANIZED HEALTH CARE EDUCATION/TRAINING PROGRAM

## 2025-03-24 PROCEDURE — 3074F SYST BP LT 130 MM HG: CPT | Performed by: STUDENT IN AN ORGANIZED HEALTH CARE EDUCATION/TRAINING PROGRAM

## 2025-03-24 PROCEDURE — G8420 CALC BMI NORM PARAMETERS: HCPCS | Performed by: STUDENT IN AN ORGANIZED HEALTH CARE EDUCATION/TRAINING PROGRAM

## 2025-03-24 PROCEDURE — 1159F MED LIST DOCD IN RCRD: CPT | Performed by: STUDENT IN AN ORGANIZED HEALTH CARE EDUCATION/TRAINING PROGRAM

## 2025-03-24 PROCEDURE — 1090F PRES/ABSN URINE INCON ASSESS: CPT | Performed by: STUDENT IN AN ORGANIZED HEALTH CARE EDUCATION/TRAINING PROGRAM

## 2025-03-24 PROCEDURE — 1123F ACP DISCUSS/DSCN MKR DOCD: CPT | Performed by: STUDENT IN AN ORGANIZED HEALTH CARE EDUCATION/TRAINING PROGRAM

## 2025-03-24 PROCEDURE — 3078F DIAST BP <80 MM HG: CPT | Performed by: STUDENT IN AN ORGANIZED HEALTH CARE EDUCATION/TRAINING PROGRAM

## 2025-03-24 ASSESSMENT — ENCOUNTER SYMPTOMS
EYE DISCHARGE: 0
RESPIRATORY NEGATIVE: 1
CONSTIPATION: 0
SORE THROAT: 0
GASTROINTESTINAL NEGATIVE: 1
NAUSEA: 0
VOMITING: 0
BACK PAIN: 0
DIARRHEA: 0
TROUBLE SWALLOWING: 0
VOICE CHANGE: 0
ABDOMINAL PAIN: 0
EYES NEGATIVE: 1

## 2025-03-24 NOTE — PROGRESS NOTES
\"Have you been to the ER, urgent care clinic since your last visit?  Hospitalized since your last visit?\"    YES - When: approximately 1  weeks ago.  Where and Why: [facility] for [facility].    “Have you seen or consulted any other health care providers outside our system since your last visit?”    NO        Chief Complaint   Patient presents with    Follow-up    Osteoporosis     /71 (BP Site: Right Upper Arm, Patient Position: Sitting, BP Cuff Size: Medium Adult)   Pulse 66   Temp 98.1 °F (36.7 °C) (Temporal)   Resp 16   Ht 1.676 m (5' 6\")   Wt 58.1 kg (128 lb)   SpO2 98%   BMI 20.66 kg/m²

## 2025-03-24 NOTE — PROGRESS NOTES
Date of Service: 3/24/2025    Primary Care Physician: Iqra Stauffer MD.  Referring physician: Xiomy   Provider: Jean Claude Prado MD        Reason for the visit:      History of present illness:   Paloma Phillips is a very pleasant 84 y.o. female with PMH of Dementia seen today for follow up of osteoporosis.     Interval hx-   3-:   Patient doing well, taking Fosamax weekly, have not started calcium or vitamin D pills. No heartburn  or chest pain .     12-:   The patient is here with her spouse. She has completed all the labs . As per her spouse she has been taking all her medications including calcium tablets and vitamin D 400 IU daily.   There is no hx bone pain or muscle pain. No interim bone fractures. The patient does not have any hx heartburn or chest pain ro abdominal pain. However the patient is taking omeprazole for GERD.       Background hx-   Paloma Phillips was diagnosed with osteoporosis recently.   She is not on Ca and Vit D supplementation at this time.   She denied any h/o fragility fractures or recent fall   Age of menopause: in early 50s.   Patient denied personal or family history of kidney stone  She lost few inches over the past 5 years   The patient diet doesn't include any dairy products, except occasional cheese   Patient reported inadequate Sun light exposure.     There is no hx smoking cigarettes, alcohol, rheumatoid arthritis, hx steroid intake in the past , no hx bone fractures in the past , no family hx osteoporosis .     DEXA scan done on 09/25/2024 showed the following:     Findings:     Femoral Neck Left: .  Bone mineral density (gm/cm2): 0.617.  % of peak bone mass: .  % for age matched controls: .  T-score: -2.3.  Z-score: -0.5.     Total Hip Left: .  Bone mineral density (gm/cm2): 0.793.  % of peak bone mass: .  % for age matched controls: .  T-score: -1.5.  Z-score: 0.1.     Lumbar Spine: L1-2.  Bone mineral density (gm/cm2): 0.790.  % of peak bone mass:

## 2025-04-23 ENCOUNTER — FOLLOWUP TELEPHONE ENCOUNTER (OUTPATIENT)
Age: 85
End: 2025-04-23

## 2025-04-23 LAB
ALBUMIN SERPL-MCNC: 4.3 G/DL (ref 3.7–4.7)
ALP SERPL-CCNC: 87 IU/L (ref 44–121)
ALT SERPL-CCNC: 12 IU/L (ref 0–32)
AST SERPL-CCNC: 18 IU/L (ref 0–40)
BILIRUB SERPL-MCNC: 0.4 MG/DL (ref 0–1.2)
BUN SERPL-MCNC: 18 MG/DL (ref 8–27)
BUN/CREAT SERPL: 10 (ref 12–28)
CALCIUM SERPL-MCNC: 9.9 MG/DL (ref 8.7–10.3)
CHLORIDE SERPL-SCNC: 100 MMOL/L (ref 96–106)
CO2 SERPL-SCNC: 28 MMOL/L (ref 20–29)
CREAT SERPL-MCNC: 1.8 MG/DL (ref 0.57–1)
EGFRCR SERPLBLD CKD-EPI 2021: 28 ML/MIN/1.73
GLOBULIN SER CALC-MCNC: 2.3 G/DL (ref 1.5–4.5)
GLUCOSE SERPL-MCNC: 92 MG/DL (ref 70–99)
POTASSIUM SERPL-SCNC: 3.3 MMOL/L (ref 3.5–5.2)
PROT SERPL-MCNC: 6.6 G/DL (ref 6–8.5)
SODIUM SERPL-SCNC: 144 MMOL/L (ref 134–144)

## 2025-04-23 NOTE — PROGRESS NOTES
There is gradual reduction in the GFR to 28 now. Fosamax needs to be changed to Prolia.     Will get labs and transition.     Called patient and her  to discuss --> no answer, left VM

## 2025-05-07 ENCOUNTER — APPOINTMENT (OUTPATIENT)
Facility: HOSPITAL | Age: 85
End: 2025-05-07
Payer: MEDICARE

## 2025-05-07 ENCOUNTER — HOSPITAL ENCOUNTER (EMERGENCY)
Facility: HOSPITAL | Age: 85
Discharge: HOME | End: 2025-05-07
Payer: MEDICARE

## 2025-05-07 VITALS
OXYGEN SATURATION: 98 % | DIASTOLIC BLOOD PRESSURE: 93 MMHG | HEIGHT: 66 IN | BODY MASS INDEX: 22.5 KG/M2 | RESPIRATION RATE: 16 BRPM | HEART RATE: 69 BPM | SYSTOLIC BLOOD PRESSURE: 131 MMHG | WEIGHT: 140 LBS | TEMPERATURE: 97.9 F

## 2025-05-07 DIAGNOSIS — E86.0 DEHYDRATION: ICD-10-CM

## 2025-05-07 DIAGNOSIS — R40.4 TRANSIENT ALTERATION OF AWARENESS: Primary | ICD-10-CM

## 2025-05-07 LAB
ALBUMIN SERPL-MCNC: 3.7 G/DL (ref 3.5–5)
ALBUMIN/GLOB SERPL: 0.9 (ref 1.1–2.2)
ALP SERPL-CCNC: 80 U/L (ref 45–117)
ALT SERPL-CCNC: 18 U/L (ref 12–78)
ANION GAP SERPL CALC-SCNC: 6 MMOL/L (ref 2–12)
APPEARANCE UR: CLEAR
AST SERPL W P-5'-P-CCNC: 17 U/L (ref 15–37)
BACTERIA URNS QL MICRO: NEGATIVE /HPF
BILIRUB SERPL-MCNC: 0.6 MG/DL (ref 0.2–1)
BILIRUB UR QL: NEGATIVE
BUN SERPL-MCNC: 20 MG/DL (ref 6–20)
BUN/CREAT SERPL: 13 (ref 12–20)
CA-I BLD-MCNC: 10.4 MG/DL (ref 8.5–10.1)
CHLORIDE SERPL-SCNC: 103 MMOL/L (ref 97–108)
CO2 SERPL-SCNC: 30 MMOL/L (ref 21–32)
COLOR UR: ABNORMAL
CREAT SERPL-MCNC: 1.58 MG/DL (ref 0.55–1.02)
EPITH CASTS URNS QL MICRO: ABNORMAL /LPF
ERYTHROCYTE [DISTWIDTH] IN BLOOD BY AUTOMATED COUNT: 14.1 % (ref 11.5–14.5)
GLOBULIN SER CALC-MCNC: 3.9 G/DL (ref 2–4)
GLUCOSE SERPL-MCNC: 83 MG/DL (ref 65–100)
GLUCOSE UR STRIP.AUTO-MCNC: 50 MG/DL
HCT VFR BLD AUTO: 38 % (ref 35–47)
HGB BLD-MCNC: 12.3 G/DL (ref 11.5–16)
HGB UR QL STRIP: NEGATIVE
KETONES UR QL STRIP.AUTO: 20 MG/DL
LEUKOCYTE ESTERASE UR QL STRIP.AUTO: ABNORMAL
MCH RBC QN AUTO: 26 PG (ref 26–34)
MCHC RBC AUTO-ENTMCNC: 32.4 G/DL (ref 30–36.5)
MCV RBC AUTO: 80.3 FL (ref 80–99)
MUCOUS THREADS URNS QL MICRO: ABNORMAL /LPF
NITRITE UR QL STRIP.AUTO: NEGATIVE
NRBC # BLD: 0 K/UL (ref 0–0.01)
NRBC BLD-RTO: 0 PER 100 WBC
PH UR STRIP: 6 (ref 5–8)
PLATELET # BLD AUTO: 249 K/UL (ref 150–400)
PMV BLD AUTO: 10.3 FL (ref 8.9–12.9)
POTASSIUM SERPL-SCNC: 3.5 MMOL/L (ref 3.5–5.1)
PROT SERPL-MCNC: 7.6 G/DL (ref 6.4–8.2)
PROT UR STRIP-MCNC: NEGATIVE MG/DL
RBC # BLD AUTO: 4.73 M/UL (ref 3.8–5.2)
RBC #/AREA URNS HPF: ABNORMAL /HPF (ref 0–5)
SODIUM SERPL-SCNC: 139 MMOL/L (ref 136–145)
SP GR UR REFRACTOMETRY: 1.02 (ref 1–1.03)
URINE CULTURE IF INDICATED: ABNORMAL
UROBILINOGEN UR QL STRIP.AUTO: 0.1 EU/DL (ref 0.1–1)
WBC # BLD AUTO: 5.9 K/UL (ref 3.6–11)
WBC URNS QL MICRO: ABNORMAL /HPF (ref 0–4)

## 2025-05-07 PROCEDURE — 99285 EMERGENCY DEPT VISIT HI MDM: CPT

## 2025-05-07 PROCEDURE — 2580000003 HC RX 258: Performed by: NURSE PRACTITIONER

## 2025-05-07 PROCEDURE — 85027 COMPLETE CBC AUTOMATED: CPT

## 2025-05-07 PROCEDURE — 36415 COLL VENOUS BLD VENIPUNCTURE: CPT

## 2025-05-07 PROCEDURE — 70450 CT HEAD/BRAIN W/O DYE: CPT

## 2025-05-07 PROCEDURE — 81001 URINALYSIS AUTO W/SCOPE: CPT

## 2025-05-07 PROCEDURE — 93005 ELECTROCARDIOGRAM TRACING: CPT | Performed by: NURSE PRACTITIONER

## 2025-05-07 PROCEDURE — 96360 HYDRATION IV INFUSION INIT: CPT

## 2025-05-07 PROCEDURE — 71045 X-RAY EXAM CHEST 1 VIEW: CPT

## 2025-05-07 PROCEDURE — 80053 COMPREHEN METABOLIC PANEL: CPT

## 2025-05-07 RX ORDER — 0.9 % SODIUM CHLORIDE 0.9 %
500 INTRAVENOUS SOLUTION INTRAVENOUS ONCE
Status: COMPLETED | OUTPATIENT
Start: 2025-05-07 | End: 2025-05-07

## 2025-05-07 RX ADMIN — SODIUM CHLORIDE 500 ML: 0.9 INJECTION, SOLUTION INTRAVENOUS at 20:21

## 2025-05-07 ASSESSMENT — PAIN - FUNCTIONAL ASSESSMENT
PAIN_FUNCTIONAL_ASSESSMENT: 0-10
PAIN_FUNCTIONAL_ASSESSMENT: NONE - DENIES PAIN
PAIN_FUNCTIONAL_ASSESSMENT: NONE - DENIES PAIN

## 2025-05-07 ASSESSMENT — PAIN SCALES - GENERAL: PAINLEVEL_OUTOF10: 3

## 2025-05-07 ASSESSMENT — PAIN DESCRIPTION - LOCATION: LOCATION: LEG

## 2025-05-07 NOTE — ED TRIAGE NOTES
Patient arrives via ems from home patient has hx of dementia and was minimally responsive today very lethargic sleeping alot

## 2025-05-07 NOTE — ED NOTES
Pt is now awake and orientated to baseline, pt able to ambulate with a 1 person assist to restroom.

## 2025-05-08 RX ORDER — LOSARTAN POTASSIUM AND HYDROCHLOROTHIAZIDE 12.5; 5 MG/1; MG/1
1 TABLET ORAL EVERY MORNING
Qty: 90 TABLET | Refills: 2 | Status: SHIPPED | OUTPATIENT
Start: 2025-05-08

## 2025-05-08 NOTE — DISCHARGE INSTRUCTIONS
Thank you for choosing our Emergency Department for your care.  It is our privilege to care for you in your time of need.  In the next several days, you may receive a survey via email or mailed to your home about your experience with our team.  We would greatly appreciate you taking a few minutes to complete the survey, as we use this information to learn what we have done well and what we could be doing better. Thank you for trusting us with your care!    Below you will find a list of your tests from today's visit.   Labs and Radiology Studies  Recent Results (from the past 12 hours)   CBC    Collection Time: 05/07/25  5:54 PM   Result Value Ref Range    WBC 5.9 3.6 - 11.0 K/uL    RBC 4.73 3.80 - 5.20 M/uL    Hemoglobin 12.3 11.5 - 16.0 g/dL    Hematocrit 38.0 35.0 - 47.0 %    MCV 80.3 80.0 - 99.0 FL    MCH 26.0 26.0 - 34.0 PG    MCHC 32.4 30.0 - 36.5 g/dL    RDW 14.1 11.5 - 14.5 %    Platelets 249 150 - 400 K/uL    MPV 10.3 8.9 - 12.9 FL    Nucleated RBCs 0.0 0.0  WBC    nRBC 0.00 0.00 - 0.01 K/uL   Comprehensive Metabolic Panel    Collection Time: 05/07/25  5:54 PM   Result Value Ref Range    Sodium 139 136 - 145 mmol/L    Potassium 3.5 3.5 - 5.1 mmol/L    Chloride 103 97 - 108 mmol/L    CO2 30 21 - 32 mmol/L    Anion Gap 6 2 - 12 mmol/L    Glucose 83 65 - 100 mg/dL    BUN 20 6 - 20 mg/dL    Creatinine 1.58 (H) 0.55 - 1.02 mg/dL    BUN/Creatinine Ratio 13 12 - 20      Est, Glom Filt Rate 32 (L) >60 ml/min/1.73m2    Calcium 10.4 (H) 8.5 - 10.1 mg/dL    Total Bilirubin 0.6 0.2 - 1.0 mg/dL    AST 17 15 - 37 U/L    ALT 18 12 - 78 U/L    Alk Phosphatase 80 45 - 117 U/L    Total Protein 7.6 6.4 - 8.2 g/dL    Albumin 3.7 3.5 - 5.0 g/dL    Globulin 3.9 2.0 - 4.0 g/dL    Albumin/Globulin Ratio 0.9 (L) 1.1 - 2.2     Urinalysis with Reflex to Culture    Collection Time: 05/07/25  5:54 PM    Specimen: Urine   Result Value Ref Range    Color, UA Yellow/Straw      Appearance Clear Clear      Specific [place], UA

## 2025-05-09 LAB
EKG ATRIAL RATE: 72 BPM
EKG DIAGNOSIS: NORMAL
EKG P AXIS: 91 DEGREES
EKG P-R INTERVAL: 162 MS
EKG Q-T INTERVAL: 390 MS
EKG QRS DURATION: 84 MS
EKG QTC CALCULATION (BAZETT): 427 MS
EKG R AXIS: 33 DEGREES
EKG T AXIS: 83 DEGREES
EKG VENTRICULAR RATE: 72 BPM

## 2025-05-12 NOTE — ED PROVIDER NOTES
05/07/2025 50 (A)     Ketones, Urine 05/07/2025 20 (A)     Bilirubin, Urine 05/07/2025 Negative     Blood, Urine 05/07/2025 Negative     Urobilinogen, Urine 05/07/2025 0.1     Nitrite, Urine 05/07/2025 Negative     Leukocyte Esterase, Urine 05/07/2025 Small (A)     WBC, UA 05/07/2025 5-10     RBC, UA 05/07/2025 0-5     Epithelial Cells, UA 05/07/2025 Few     BACTERIA, URINE 05/07/2025 Negative     Urine Culture if Indicat* 05/07/2025 Culture not indicated by UA result     Mucus, UA 05/07/2025 Trace (A)          EKG:.EKG interpreted by emergency room attending.  Shows normal sinus rhythm with PAC, HR 72.  No STEMI    Radiologic Studies:  Radiographic images are visualized and preliminarily interpreted by the ED Provider with the following findings: See ED Course Below.     Interpretation per the Radiologist below, if available at the time of this note:  XR CHEST PORTABLE   Final Result      No acute process on portable chest.         Electronically signed by Chao Barcenas MD      CT HEAD WO CONTRAST   Final Result   No acute intracranial abnormality.            Electronically signed by Pawel Prado           Records Reviewed: Not Applicable    MEDICAL DECISION MAKING and ED COURSE   4:54 PM Differential and Considerations of tests not ordered: Patient presenting with altered mental status. DDx: medication toxicity, infection, anemia, electrolyte/metabolic anomoly, hypercapnea, stroke/bleed/mass, dehydration, illicit drug intoxication. Will obtain CBC CMP troponin to evaluate for ACS anemia, infection, dehydration, electrolyte abnormality, renal/hepatic dysfunction, UA to evaluate for infection, EKG and imaging       Vitals:    Vitals:    05/07/25 1713 05/07/25 2028 05/07/25 2120   BP: (!) 162/96 (!) 152/93 (!) 131/93   Pulse: 72 79 69   Resp: 14 16 16   Temp: 97.9 °F (36.6 °C) 98 °F (36.7 °C) 97.9 °F (36.6 °C)   TempSrc: Axillary Oral Oral   SpO2: 99% 97% 98%   Weight: 63.5 kg (140 lb)     Height: 1.676 m (5' 6\")

## 2025-05-21 ENCOUNTER — OFFICE VISIT (OUTPATIENT)
Age: 85
End: 2025-05-21
Payer: MEDICARE

## 2025-05-21 VITALS
HEIGHT: 66 IN | BODY MASS INDEX: 18.79 KG/M2 | HEART RATE: 69 BPM | SYSTOLIC BLOOD PRESSURE: 134 MMHG | WEIGHT: 116.9 LBS | DIASTOLIC BLOOD PRESSURE: 92 MMHG | TEMPERATURE: 98.4 F

## 2025-05-21 DIAGNOSIS — M81.0 AGE-RELATED OSTEOPOROSIS WITHOUT CURRENT PATHOLOGICAL FRACTURE: ICD-10-CM

## 2025-05-21 DIAGNOSIS — M81.0 AGE-RELATED OSTEOPOROSIS WITHOUT CURRENT PATHOLOGICAL FRACTURE: Primary | ICD-10-CM

## 2025-05-21 PROCEDURE — G8420 CALC BMI NORM PARAMETERS: HCPCS | Performed by: STUDENT IN AN ORGANIZED HEALTH CARE EDUCATION/TRAINING PROGRAM

## 2025-05-21 PROCEDURE — 99213 OFFICE O/P EST LOW 20 MIN: CPT | Performed by: STUDENT IN AN ORGANIZED HEALTH CARE EDUCATION/TRAINING PROGRAM

## 2025-05-21 PROCEDURE — G8399 PT W/DXA RESULTS DOCUMENT: HCPCS | Performed by: STUDENT IN AN ORGANIZED HEALTH CARE EDUCATION/TRAINING PROGRAM

## 2025-05-21 PROCEDURE — 1036F TOBACCO NON-USER: CPT | Performed by: STUDENT IN AN ORGANIZED HEALTH CARE EDUCATION/TRAINING PROGRAM

## 2025-05-21 PROCEDURE — 1159F MED LIST DOCD IN RCRD: CPT | Performed by: STUDENT IN AN ORGANIZED HEALTH CARE EDUCATION/TRAINING PROGRAM

## 2025-05-21 PROCEDURE — 3080F DIAST BP >= 90 MM HG: CPT | Performed by: STUDENT IN AN ORGANIZED HEALTH CARE EDUCATION/TRAINING PROGRAM

## 2025-05-21 PROCEDURE — 1126F AMNT PAIN NOTED NONE PRSNT: CPT | Performed by: STUDENT IN AN ORGANIZED HEALTH CARE EDUCATION/TRAINING PROGRAM

## 2025-05-21 PROCEDURE — G8427 DOCREV CUR MEDS BY ELIG CLIN: HCPCS | Performed by: STUDENT IN AN ORGANIZED HEALTH CARE EDUCATION/TRAINING PROGRAM

## 2025-05-21 PROCEDURE — 1090F PRES/ABSN URINE INCON ASSESS: CPT | Performed by: STUDENT IN AN ORGANIZED HEALTH CARE EDUCATION/TRAINING PROGRAM

## 2025-05-21 PROCEDURE — 3075F SYST BP GE 130 - 139MM HG: CPT | Performed by: STUDENT IN AN ORGANIZED HEALTH CARE EDUCATION/TRAINING PROGRAM

## 2025-05-21 PROCEDURE — 1123F ACP DISCUSS/DSCN MKR DOCD: CPT | Performed by: STUDENT IN AN ORGANIZED HEALTH CARE EDUCATION/TRAINING PROGRAM

## 2025-05-21 RX ORDER — PSEUDOEPHEDRINE HCL 30 MG
500 TABLET ORAL DAILY
Qty: 90 TABLET | Refills: 1 | Status: SHIPPED | OUTPATIENT
Start: 2025-05-21

## 2025-05-21 ASSESSMENT — ENCOUNTER SYMPTOMS
TROUBLE SWALLOWING: 0
ABDOMINAL PAIN: 0
EYES NEGATIVE: 1
VOICE CHANGE: 0
SORE THROAT: 0
VOMITING: 0
NAUSEA: 0
CONSTIPATION: 0
RESPIRATORY NEGATIVE: 1
DIARRHEA: 0
EYE DISCHARGE: 0
BACK PAIN: 0
GASTROINTESTINAL NEGATIVE: 1

## 2025-05-21 NOTE — PROGRESS NOTES
Date of Service: 5/21/2025    Primary Care Physician: Iqra Stauffer MD.  Referring physician: No ref. provider found   Provider: Jean Claude Prado MD        Reason for the visit:      History of present illness:   Paloma Phillips is a very pleasant 84 y.o. female with PMH of Dementia seen today for follow up of osteoporosis.     Interval hx- : following up for labs and further management   3-:   Patient doing well, taking Fosamax weekly, have not started calcium or vitamin D pills. No heartburn  or chest pain .     12-:   The patient is here with her spouse. She has completed all the labs . As per her spouse she has been taking all her medications including calcium tablets and vitamin D 400 IU daily.   There is no hx bone pain or muscle pain. No interim bone fractures. The patient does not have any hx heartburn or chest pain ro abdominal pain. However the patient is taking omeprazole for GERD.       Background hx-   Paloma Phillips was diagnosed with osteoporosis recently.   She is not on Ca and Vit D supplementation at this time.   She denied any h/o fragility fractures or recent fall   Age of menopause: in early 50s.   Patient denied personal or family history of kidney stone  She lost few inches over the past 5 years   The patient diet doesn't include any dairy products, except occasional cheese   Patient reported inadequate Sun light exposure.     There is no hx smoking cigarettes, alcohol, rheumatoid arthritis, hx steroid intake in the past , no hx bone fractures in the past , no family hx osteoporosis .     DEXA scan done on 09/25/2024 showed the following:     Findings:     Femoral Neck Left: .  Bone mineral density (gm/cm2): 0.617.  % of peak bone mass: .  % for age matched controls: .  T-score: -2.3.  Z-score: -0.5.     Total Hip Left: .  Bone mineral density (gm/cm2): 0.793.  % of peak bone mass: .  % for age matched controls: .  T-score: -1.5.  Z-score: 0.1.     Lumbar

## 2025-05-21 NOTE — PROGRESS NOTES
Have you been to the ER, urgent care clinic since your last visit?  Hospitalized since your last visit?   YES - When: approximately 2  weeks ago.  Where and Why: [facility].    Have you seen or consulted any other health care providers outside our system since your last visit?   NO    Chief Complaint   Patient presents with    Osteoporosis    Follow-up     BP (!) 134/92 (BP Site: Right Upper Arm, Patient Position: Sitting, BP Cuff Size: Medium Adult)   Pulse 69   Temp 98.4 °F (36.9 °C) (Temporal)   Ht 1.676 m (5' 6\")   Wt 53 kg (116 lb 14.4 oz)   BMI 18.87 kg/m²

## 2025-05-21 NOTE — PATIENT INSTRUCTIONS
Please take vitamin D 400 IU daily     Start taking calcium citrate 500 mg once daily     Stop Fosamax

## 2025-05-22 LAB
ALBUMIN SERPL-MCNC: 4.2 G/DL (ref 3.7–4.7)
ALP SERPL-CCNC: 78 IU/L (ref 44–121)
ALT SERPL-CCNC: 9 IU/L (ref 0–32)
AST SERPL-CCNC: 19 IU/L (ref 0–40)
BILIRUB SERPL-MCNC: 0.5 MG/DL (ref 0–1.2)
BUN SERPL-MCNC: 18 MG/DL (ref 8–27)
BUN/CREAT SERPL: 12 (ref 12–28)
CALCIUM SERPL-MCNC: 10.3 MG/DL (ref 8.7–10.3)
CHLORIDE SERPL-SCNC: 102 MMOL/L (ref 96–106)
CO2 SERPL-SCNC: 26 MMOL/L (ref 20–29)
CREAT SERPL-MCNC: 1.45 MG/DL (ref 0.57–1)
EGFRCR SERPLBLD CKD-EPI 2021: 36 ML/MIN/1.73
GLOBULIN SER CALC-MCNC: 2.6 G/DL (ref 1.5–4.5)
GLUCOSE SERPL-MCNC: 91 MG/DL (ref 70–99)
PHOSPHATE SERPL-MCNC: 3 MG/DL (ref 3–4.3)
POTASSIUM SERPL-SCNC: 4.1 MMOL/L (ref 3.5–5.2)
PROT SERPL-MCNC: 6.8 G/DL (ref 6–8.5)
PTH-INTACT SERPL-MCNC: 13 PG/ML (ref 15–65)
SODIUM SERPL-SCNC: 144 MMOL/L (ref 134–144)

## 2025-05-23 ENCOUNTER — FOLLOWUP TELEPHONE ENCOUNTER (OUTPATIENT)
Age: 85
End: 2025-05-23

## 2025-05-23 LAB — ALP BONE SERPL-MCNC: 12 UG/L

## 2025-05-23 NOTE — PROGRESS NOTES
Called patient to discuss labs , no answer , left VM    Labs look okay, although PTH is at low, unsure why     At this point I do not suspect any renal osteodystrophy   GFR is better and > 35 but has been fluctuating and hence will change the medication from Fosamax to Prolia as bisphosphonates are not safe when GFR < 35     I have discussed the above plan with patient's spouse in the last visit , will send in the prescription to infusion center

## 2025-05-28 ENCOUNTER — APPOINTMENT (OUTPATIENT)
Facility: HOSPITAL | Age: 85
DRG: 071 | End: 2025-05-28
Payer: MEDICARE

## 2025-05-28 ENCOUNTER — HOSPITAL ENCOUNTER (INPATIENT)
Facility: HOSPITAL | Age: 85
LOS: 1 days | Discharge: HOME HEALTH | DRG: 071 | End: 2025-05-31
Attending: STUDENT IN AN ORGANIZED HEALTH CARE EDUCATION/TRAINING PROGRAM | Admitting: INTERNAL MEDICINE
Payer: MEDICARE

## 2025-05-28 DIAGNOSIS — I63.9 CEREBROVASCULAR ACCIDENT (CVA), UNSPECIFIED MECHANISM (HCC): Primary | ICD-10-CM

## 2025-05-28 DIAGNOSIS — R40.4 TRANSIENT ALTERATION OF AWARENESS: ICD-10-CM

## 2025-05-28 DIAGNOSIS — G45.9 TIA (TRANSIENT ISCHEMIC ATTACK): ICD-10-CM

## 2025-05-28 PROBLEM — R29.90 STROKE-LIKE SYMPTOM: Status: ACTIVE | Noted: 2025-05-28

## 2025-05-28 LAB
ALBUMIN SERPL-MCNC: 3.6 G/DL (ref 3.5–5)
ALBUMIN/GLOB SERPL: 1.2 (ref 1.1–2.2)
ALP SERPL-CCNC: 74 U/L (ref 45–117)
ALT SERPL-CCNC: 11 U/L (ref 12–78)
ANION GAP SERPL CALC-SCNC: 10 MMOL/L (ref 2–12)
AST SERPL W P-5'-P-CCNC: 16 U/L (ref 15–37)
BASOPHILS # BLD: 0.06 K/UL (ref 0–0.1)
BASOPHILS NFR BLD: 0.8 % (ref 0–1)
BILIRUB SERPL-MCNC: 0.5 MG/DL (ref 0.2–1)
BUN SERPL-MCNC: 20 MG/DL (ref 6–20)
BUN/CREAT SERPL: 13 (ref 12–20)
CA-I BLD-MCNC: 10.1 MG/DL (ref 8.5–10.1)
CHLORIDE SERPL-SCNC: 105 MMOL/L (ref 97–108)
CK SERPL-CCNC: 61 U/L (ref 26–192)
CO2 SERPL-SCNC: 26 MMOL/L (ref 21–32)
CREAT SERPL-MCNC: 1.57 MG/DL (ref 0.55–1.02)
DIFFERENTIAL METHOD BLD: ABNORMAL
EOSINOPHIL # BLD: 0.07 K/UL (ref 0–0.4)
EOSINOPHIL NFR BLD: 0.9 % (ref 0–7)
ERYTHROCYTE [DISTWIDTH] IN BLOOD BY AUTOMATED COUNT: 14.6 % (ref 11.5–14.5)
GLOBULIN SER CALC-MCNC: 3.1 G/DL (ref 2–4)
GLUCOSE SERPL-MCNC: 147 MG/DL (ref 65–100)
HCT VFR BLD AUTO: 35.1 % (ref 35–47)
HGB BLD-MCNC: 11.4 G/DL (ref 11.5–16)
IMM GRANULOCYTES # BLD AUTO: 0.03 K/UL (ref 0–0.04)
IMM GRANULOCYTES NFR BLD AUTO: 0.4 % (ref 0–0.5)
INR PPP: 1 (ref 0.9–1.1)
LYMPHOCYTES # BLD: 2.66 K/UL (ref 0.8–3.5)
LYMPHOCYTES NFR BLD: 34.5 % (ref 12–49)
MCH RBC QN AUTO: 26.8 PG (ref 26–34)
MCHC RBC AUTO-ENTMCNC: 32.5 G/DL (ref 30–36.5)
MCV RBC AUTO: 82.6 FL (ref 80–99)
MONOCYTES # BLD: 0.5 K/UL (ref 0–1)
MONOCYTES NFR BLD: 6.5 % (ref 5–13)
NEUTS SEG # BLD: 4.4 K/UL (ref 1.8–8)
NEUTS SEG NFR BLD: 56.9 % (ref 32–75)
NRBC # BLD: 0 K/UL (ref 0–0.01)
NRBC BLD-RTO: 0 PER 100 WBC
PLATELET # BLD AUTO: 269 K/UL (ref 150–400)
PMV BLD AUTO: 10.5 FL (ref 8.9–12.9)
POTASSIUM SERPL-SCNC: 3.4 MMOL/L (ref 3.5–5.1)
PROT SERPL-MCNC: 6.7 G/DL (ref 6.4–8.2)
PROTHROMBIN TIME: 13 SEC (ref 11.9–14.6)
RBC # BLD AUTO: 4.25 M/UL (ref 3.8–5.2)
SODIUM SERPL-SCNC: 141 MMOL/L (ref 136–145)
TROPONIN I SERPL HS-MCNC: 12 NG/L (ref 0–51)
TSH SERPL DL<=0.05 MIU/L-ACNC: 3.14 UIU/ML (ref 0.36–3.74)
WBC # BLD AUTO: 7.7 K/UL (ref 3.6–11)

## 2025-05-28 PROCEDURE — 0042T CT BRAIN PERFUSION: CPT

## 2025-05-28 PROCEDURE — 96360 HYDRATION IV INFUSION INIT: CPT

## 2025-05-28 PROCEDURE — 84443 ASSAY THYROID STIM HORMONE: CPT

## 2025-05-28 PROCEDURE — 84484 ASSAY OF TROPONIN QUANT: CPT

## 2025-05-28 PROCEDURE — 94761 N-INVAS EAR/PLS OXIMETRY MLT: CPT

## 2025-05-28 PROCEDURE — 80053 COMPREHEN METABOLIC PANEL: CPT

## 2025-05-28 PROCEDURE — 85025 COMPLETE CBC W/AUTO DIFF WBC: CPT

## 2025-05-28 PROCEDURE — 70450 CT HEAD/BRAIN W/O DYE: CPT

## 2025-05-28 PROCEDURE — 82550 ASSAY OF CK (CPK): CPT

## 2025-05-28 PROCEDURE — 99285 EMERGENCY DEPT VISIT HI MDM: CPT

## 2025-05-28 PROCEDURE — 71045 X-RAY EXAM CHEST 1 VIEW: CPT

## 2025-05-28 PROCEDURE — 70496 CT ANGIOGRAPHY HEAD: CPT

## 2025-05-28 PROCEDURE — 6360000004 HC RX CONTRAST MEDICATION: Performed by: STUDENT IN AN ORGANIZED HEALTH CARE EDUCATION/TRAINING PROGRAM

## 2025-05-28 PROCEDURE — 85610 PROTHROMBIN TIME: CPT

## 2025-05-28 PROCEDURE — 93005 ELECTROCARDIOGRAM TRACING: CPT | Performed by: STUDENT IN AN ORGANIZED HEALTH CARE EDUCATION/TRAINING PROGRAM

## 2025-05-28 PROCEDURE — 2580000003 HC RX 258: Performed by: STUDENT IN AN ORGANIZED HEALTH CARE EDUCATION/TRAINING PROGRAM

## 2025-05-28 RX ORDER — POLYETHYLENE GLYCOL 3350 17 G/17G
17 POWDER, FOR SOLUTION ORAL DAILY PRN
Status: DISCONTINUED | OUTPATIENT
Start: 2025-05-28 | End: 2025-05-31 | Stop reason: HOSPADM

## 2025-05-28 RX ORDER — SODIUM CHLORIDE 0.9 % (FLUSH) 0.9 %
5-40 SYRINGE (ML) INJECTION EVERY 12 HOURS SCHEDULED
Status: DISCONTINUED | OUTPATIENT
Start: 2025-05-28 | End: 2025-05-31 | Stop reason: HOSPADM

## 2025-05-28 RX ORDER — ASPIRIN 300 MG/1
300 SUPPOSITORY RECTAL DAILY
Status: DISCONTINUED | OUTPATIENT
Start: 2025-05-29 | End: 2025-05-31 | Stop reason: HOSPADM

## 2025-05-28 RX ORDER — SODIUM CHLORIDE 9 MG/ML
INJECTION, SOLUTION INTRAVENOUS PRN
Status: DISCONTINUED | OUTPATIENT
Start: 2025-05-28 | End: 2025-05-31 | Stop reason: HOSPADM

## 2025-05-28 RX ORDER — ASPIRIN 81 MG/1
81 TABLET, CHEWABLE ORAL DAILY
Status: DISCONTINUED | OUTPATIENT
Start: 2025-05-29 | End: 2025-05-31 | Stop reason: HOSPADM

## 2025-05-28 RX ORDER — IOPAMIDOL 755 MG/ML
100 INJECTION, SOLUTION INTRAVASCULAR
Status: COMPLETED | OUTPATIENT
Start: 2025-05-28 | End: 2025-05-28

## 2025-05-28 RX ORDER — 0.9 % SODIUM CHLORIDE 0.9 %
500 INTRAVENOUS SOLUTION INTRAVENOUS ONCE
Status: COMPLETED | OUTPATIENT
Start: 2025-05-28 | End: 2025-05-29

## 2025-05-28 RX ORDER — ENOXAPARIN SODIUM 100 MG/ML
30 INJECTION SUBCUTANEOUS DAILY
Status: DISCONTINUED | OUTPATIENT
Start: 2025-05-29 | End: 2025-05-31 | Stop reason: HOSPADM

## 2025-05-28 RX ORDER — ONDANSETRON 4 MG/1
4 TABLET, ORALLY DISINTEGRATING ORAL EVERY 8 HOURS PRN
Status: DISCONTINUED | OUTPATIENT
Start: 2025-05-28 | End: 2025-05-31 | Stop reason: HOSPADM

## 2025-05-28 RX ORDER — ONDANSETRON 2 MG/ML
4 INJECTION INTRAMUSCULAR; INTRAVENOUS EVERY 6 HOURS PRN
Status: DISCONTINUED | OUTPATIENT
Start: 2025-05-28 | End: 2025-05-31 | Stop reason: HOSPADM

## 2025-05-28 RX ORDER — ATORVASTATIN CALCIUM 40 MG/1
80 TABLET, FILM COATED ORAL NIGHTLY
Status: DISCONTINUED | OUTPATIENT
Start: 2025-05-28 | End: 2025-05-31 | Stop reason: HOSPADM

## 2025-05-28 RX ORDER — SODIUM CHLORIDE 0.9 % (FLUSH) 0.9 %
5-40 SYRINGE (ML) INJECTION PRN
Status: DISCONTINUED | OUTPATIENT
Start: 2025-05-28 | End: 2025-05-31 | Stop reason: HOSPADM

## 2025-05-28 RX ADMIN — SODIUM CHLORIDE 500 ML: 0.9 INJECTION, SOLUTION INTRAVENOUS at 22:23

## 2025-05-28 RX ADMIN — IOPAMIDOL 100 ML: 755 INJECTION, SOLUTION INTRAVENOUS at 21:52

## 2025-05-29 ENCOUNTER — HOSPITAL ENCOUNTER (OUTPATIENT)
Facility: HOSPITAL | Age: 85
Setting detail: OBSERVATION
DRG: 071 | End: 2025-05-29
Payer: MEDICARE

## 2025-05-29 ENCOUNTER — APPOINTMENT (OUTPATIENT)
Facility: HOSPITAL | Age: 85
DRG: 071 | End: 2025-05-29
Payer: MEDICARE

## 2025-05-29 LAB
AMMONIA PLAS-SCNC: 14 UMOL/L
AMPHET UR QL SCN: NEGATIVE
APPEARANCE UR: CLEAR
BACTERIA URNS QL MICRO: NEGATIVE /HPF
BARBITURATES UR QL SCN: NEGATIVE
BASOPHILS # BLD: 0.04 K/UL (ref 0–0.1)
BASOPHILS NFR BLD: 0.5 % (ref 0–1)
BENZODIAZ UR QL: NEGATIVE
BILIRUB UR QL: NEGATIVE
CANNABINOIDS UR QL SCN: NEGATIVE
CHOLEST SERPL-MCNC: 175 MG/DL
COCAINE UR QL SCN: NEGATIVE
COLOR UR: ABNORMAL
DIFFERENTIAL METHOD BLD: ABNORMAL
ECHO AO ASC DIAM: 2.6 CM
ECHO AO ASCENDING AORTA INDEX: 1.64 CM/M2
ECHO AO ROOT DIAM: 2.8 CM
ECHO AO ROOT INDEX: 1.76 CM/M2
ECHO AV AREA PEAK VELOCITY: 1.9 CM2
ECHO AV AREA VTI: 2.1 CM2
ECHO AV AREA/BSA PEAK VELOCITY: 1.2 CM2/M2
ECHO AV AREA/BSA VTI: 1.3 CM2/M2
ECHO AV MEAN GRADIENT: 5 MMHG
ECHO AV MEAN VELOCITY: 1 M/S
ECHO AV PEAK GRADIENT: 9 MMHG
ECHO AV PEAK VELOCITY: 1.5 M/S
ECHO AV VELOCITY RATIO: 0.67
ECHO AV VTI: 28.7 CM
ECHO BSA: 1.57 M2
ECHO LA AREA 4C: 18.6 CM2
ECHO LA DIAMETER INDEX: 1.89 CM/M2
ECHO LA DIAMETER: 3 CM
ECHO LA MAJOR AXIS: 6.3 CM
ECHO LA TO AORTIC ROOT RATIO: 1.07
ECHO LA VOL MOD A4C: 40 ML (ref 22–52)
ECHO LA VOLUME INDEX MOD A4C: 25 ML/M2 (ref 16–34)
ECHO LV E' LATERAL VELOCITY: 8.2 CM/S
ECHO LV E' SEPTAL VELOCITY: 5.66 CM/S
ECHO LV EDV A4C: 63 ML
ECHO LV EDV INDEX A4C: 40 ML/M2
ECHO LV EF PHYSICIAN: 65 %
ECHO LV EJECTION FRACTION A4C: 58 %
ECHO LV ESV A4C: 26 ML
ECHO LV ESV INDEX A4C: 16 ML/M2
ECHO LV FRACTIONAL SHORTENING: 51 % (ref 28–44)
ECHO LV INTERNAL DIMENSION DIASTOLE INDEX: 2.7 CM/M2
ECHO LV INTERNAL DIMENSION DIASTOLIC: 4.3 CM (ref 3.9–5.3)
ECHO LV INTERNAL DIMENSION SYSTOLIC INDEX: 1.32 CM/M2
ECHO LV INTERNAL DIMENSION SYSTOLIC: 2.1 CM
ECHO LV IVSD: 0.7 CM (ref 0.6–0.9)
ECHO LV MASS 2D: 114.2 G (ref 67–162)
ECHO LV MASS INDEX 2D: 71.8 G/M2 (ref 43–95)
ECHO LV POSTERIOR WALL DIASTOLIC: 1 CM (ref 0.6–0.9)
ECHO LV RELATIVE WALL THICKNESS RATIO: 0.47
ECHO LVOT AREA: 2.8 CM2
ECHO LVOT AV VTI INDEX: 0.75
ECHO LVOT DIAM: 1.9 CM
ECHO LVOT MEAN GRADIENT: 2 MMHG
ECHO LVOT PEAK GRADIENT: 4 MMHG
ECHO LVOT PEAK VELOCITY: 1 M/S
ECHO LVOT STROKE VOLUME INDEX: 38.5 ML/M2
ECHO LVOT SV: 61.2 ML
ECHO LVOT VTI: 21.6 CM
ECHO MV A VELOCITY: 0.72 M/S
ECHO MV AREA VTI: 2.2 CM2
ECHO MV E DECELERATION TIME (DT): 365 MS
ECHO MV E VELOCITY: 0.46 M/S
ECHO MV E/A RATIO: 0.64
ECHO MV E/E' LATERAL: 5.61
ECHO MV E/E' RATIO (AVERAGED): 6.87
ECHO MV E/E' SEPTAL: 8.13
ECHO MV LVOT VTI INDEX: 1.31
ECHO MV MAX VELOCITY: 1.1 M/S
ECHO MV MEAN GRADIENT: 2 MMHG
ECHO MV MEAN VELOCITY: 0.7 M/S
ECHO MV PEAK GRADIENT: 5 MMHG
ECHO MV REGURGITANT PEAK GRADIENT: 29 MMHG
ECHO MV REGURGITANT PEAK VELOCITY: 2.7 M/S
ECHO MV VTI: 28.3 CM
ECHO PV ACCELERATION TIME (AT): 165 MS
ECHO PV MAX VELOCITY: 0.8 M/S
ECHO PV MEAN GRADIENT: 2 MMHG
ECHO PV MEAN VELOCITY: 0.6 M/S
ECHO PV PEAK GRADIENT: 3 MMHG
ECHO PV VTI: 20.6 CM
ECHO RA AREA 4C: 9.9 CM2
ECHO RA END SYSTOLIC VOLUME APICAL 4 CHAMBER INDEX BSA: 14 ML/M2
ECHO RA VOLUME: 22 ML
ECHO RV BASAL DIMENSION: 3.2 CM
ECHO RV FREE WALL PEAK S': 11.9 CM/S
ECHO RV TAPSE: 1.8 CM (ref 1.7–?)
ECHO TV REGURGITANT MAX VELOCITY: 1.32 M/S
ECHO TV REGURGITANT PEAK GRADIENT: 7 MMHG
EKG ATRIAL RATE: 65 BPM
EKG DIAGNOSIS: NORMAL
EKG P AXIS: 104 DEGREES
EKG P-R INTERVAL: 176 MS
EKG Q-T INTERVAL: 420 MS
EKG QRS DURATION: 86 MS
EKG QTC CALCULATION (BAZETT): 436 MS
EKG R AXIS: -8 DEGREES
EKG T AXIS: 88 DEGREES
EKG VENTRICULAR RATE: 65 BPM
EOSINOPHIL # BLD: 0.04 K/UL (ref 0–0.4)
EOSINOPHIL NFR BLD: 0.5 % (ref 0–7)
EPITH CASTS URNS QL MICRO: ABNORMAL /LPF
ERYTHROCYTE [DISTWIDTH] IN BLOOD BY AUTOMATED COUNT: 14.8 % (ref 11.5–14.5)
EST. AVERAGE GLUCOSE BLD GHB EST-MCNC: 103 MG/DL
FLUAV RNA SPEC QL NAA+PROBE: NOT DETECTED
FLUBV RNA SPEC QL NAA+PROBE: NOT DETECTED
FOLATE SERPL-MCNC: 49 NG/ML (ref 5–21)
GLUCOSE UR STRIP.AUTO-MCNC: 150 MG/DL
HBA1C MFR BLD: 5.2 % (ref 4–5.6)
HCT VFR BLD AUTO: 33.3 % (ref 35–47)
HDLC SERPL-MCNC: 59 MG/DL
HDLC SERPL: 3 (ref 0–5)
HGB BLD-MCNC: 11.2 G/DL (ref 11.5–16)
HGB UR QL STRIP: NEGATIVE
IMM GRANULOCYTES # BLD AUTO: 0.02 K/UL (ref 0–0.04)
IMM GRANULOCYTES NFR BLD AUTO: 0.2 % (ref 0–0.5)
KETONES UR QL STRIP.AUTO: NEGATIVE MG/DL
LDLC SERPL CALC-MCNC: 97.2 MG/DL (ref 0–100)
LEUKOCYTE ESTERASE UR QL STRIP.AUTO: NEGATIVE
LIPID PANEL: NORMAL
LYMPHOCYTES # BLD: 1.64 K/UL (ref 0.8–3.5)
LYMPHOCYTES NFR BLD: 19.2 % (ref 12–49)
Lab: NORMAL
MCH RBC QN AUTO: 26.8 PG (ref 26–34)
MCHC RBC AUTO-ENTMCNC: 33.6 G/DL (ref 30–36.5)
MCV RBC AUTO: 79.7 FL (ref 80–99)
METHADONE UR QL: NEGATIVE
MONOCYTES # BLD: 0.57 K/UL (ref 0–1)
MONOCYTES NFR BLD: 6.7 % (ref 5–13)
MUCOUS THREADS URNS QL MICRO: ABNORMAL /LPF
NEUTS SEG # BLD: 6.22 K/UL (ref 1.8–8)
NEUTS SEG NFR BLD: 72.9 % (ref 32–75)
NITRITE UR QL STRIP.AUTO: NEGATIVE
NRBC # BLD: 0 K/UL (ref 0–0.01)
NRBC BLD-RTO: 0 PER 100 WBC
OPIATES UR QL: NEGATIVE
PCP UR QL: NEGATIVE
PH UR STRIP: 7 (ref 5–8)
PLATELET # BLD AUTO: 161 K/UL (ref 150–400)
PMV BLD AUTO: 10.6 FL (ref 8.9–12.9)
PROT UR STRIP-MCNC: NEGATIVE MG/DL
RBC # BLD AUTO: 4.18 M/UL (ref 3.8–5.2)
RBC #/AREA URNS HPF: ABNORMAL /HPF (ref 0–5)
SARS-COV-2 RNA RESP QL NAA+PROBE: NOT DETECTED
SP GR UR REFRACTOMETRY: >1.03 (ref 1–1.03)
TRIGL SERPL-MCNC: 94 MG/DL
URINE CULTURE IF INDICATED: ABNORMAL
UROBILINOGEN UR QL STRIP.AUTO: 0.1 EU/DL (ref 0.1–1)
VIT B12 SERPL-MCNC: 1149 PG/ML (ref 193–986)
VLDLC SERPL CALC-MCNC: 18.8 MG/DL
WBC # BLD AUTO: 8.5 K/UL (ref 3.6–11)
WBC URNS QL MICRO: ABNORMAL /HPF (ref 0–4)

## 2025-05-29 PROCEDURE — 36415 COLL VENOUS BLD VENIPUNCTURE: CPT

## 2025-05-29 PROCEDURE — 81001 URINALYSIS AUTO W/SCOPE: CPT

## 2025-05-29 PROCEDURE — 72141 MRI NECK SPINE W/O DYE: CPT

## 2025-05-29 PROCEDURE — 72146 MRI CHEST SPINE W/O DYE: CPT

## 2025-05-29 PROCEDURE — 6360000002 HC RX W HCPCS

## 2025-05-29 PROCEDURE — 2500000003 HC RX 250 WO HCPCS

## 2025-05-29 PROCEDURE — 96372 THER/PROPH/DIAG INJ SC/IM: CPT

## 2025-05-29 PROCEDURE — 92610 EVALUATE SWALLOWING FUNCTION: CPT

## 2025-05-29 PROCEDURE — G0378 HOSPITAL OBSERVATION PER HR: HCPCS

## 2025-05-29 PROCEDURE — 87636 SARSCOV2 & INF A&B AMP PRB: CPT

## 2025-05-29 PROCEDURE — 80307 DRUG TEST PRSMV CHEM ANLYZR: CPT

## 2025-05-29 PROCEDURE — 6370000000 HC RX 637 (ALT 250 FOR IP)

## 2025-05-29 PROCEDURE — 83036 HEMOGLOBIN GLYCOSYLATED A1C: CPT

## 2025-05-29 PROCEDURE — 82140 ASSAY OF AMMONIA: CPT

## 2025-05-29 PROCEDURE — 82746 ASSAY OF FOLIC ACID SERUM: CPT

## 2025-05-29 PROCEDURE — 80061 LIPID PANEL: CPT

## 2025-05-29 PROCEDURE — 93306 TTE W/DOPPLER COMPLETE: CPT

## 2025-05-29 PROCEDURE — 96361 HYDRATE IV INFUSION ADD-ON: CPT

## 2025-05-29 PROCEDURE — 70551 MRI BRAIN STEM W/O DYE: CPT

## 2025-05-29 PROCEDURE — 6370000000 HC RX 637 (ALT 250 FOR IP): Performed by: PHYSICIAN ASSISTANT

## 2025-05-29 PROCEDURE — 82607 VITAMIN B-12: CPT

## 2025-05-29 PROCEDURE — 85025 COMPLETE CBC W/AUTO DIFF WBC: CPT

## 2025-05-29 RX ORDER — DONEPEZIL HYDROCHLORIDE 5 MG/1
10 TABLET, FILM COATED ORAL NIGHTLY
Status: DISCONTINUED | OUTPATIENT
Start: 2025-05-29 | End: 2025-05-31 | Stop reason: HOSPADM

## 2025-05-29 RX ORDER — PANTOPRAZOLE SODIUM 40 MG/1
40 TABLET, DELAYED RELEASE ORAL
Status: DISCONTINUED | OUTPATIENT
Start: 2025-05-29 | End: 2025-05-31 | Stop reason: HOSPADM

## 2025-05-29 RX ORDER — MIRTAZAPINE 15 MG/1
7.5 TABLET, FILM COATED ORAL NIGHTLY
Status: DISCONTINUED | OUTPATIENT
Start: 2025-05-29 | End: 2025-05-31 | Stop reason: HOSPADM

## 2025-05-29 RX ORDER — MEMANTINE HYDROCHLORIDE 10 MG/1
10 TABLET ORAL 2 TIMES DAILY
Status: DISCONTINUED | OUTPATIENT
Start: 2025-05-29 | End: 2025-05-31 | Stop reason: HOSPADM

## 2025-05-29 RX ORDER — POTASSIUM CHLORIDE 1500 MG/1
40 TABLET, EXTENDED RELEASE ORAL ONCE
Status: COMPLETED | OUTPATIENT
Start: 2025-05-29 | End: 2025-05-29

## 2025-05-29 RX ADMIN — MIRTAZAPINE 7.5 MG: 15 TABLET, FILM COATED ORAL at 21:54

## 2025-05-29 RX ADMIN — DONEPEZIL HYDROCHLORIDE 10 MG: 5 TABLET, FILM COATED ORAL at 21:54

## 2025-05-29 RX ADMIN — PANTOPRAZOLE SODIUM 40 MG: 40 TABLET, DELAYED RELEASE ORAL at 07:55

## 2025-05-29 RX ADMIN — POTASSIUM CHLORIDE 40 MEQ: 1500 TABLET, EXTENDED RELEASE ORAL at 02:57

## 2025-05-29 RX ADMIN — SODIUM CHLORIDE, PRESERVATIVE FREE 10 ML: 5 INJECTION INTRAVENOUS at 21:54

## 2025-05-29 RX ADMIN — Medication 3 MG: at 23:17

## 2025-05-29 RX ADMIN — ATORVASTATIN CALCIUM 80 MG: 40 TABLET, FILM COATED ORAL at 21:54

## 2025-05-29 RX ADMIN — ASPIRIN 81 MG: 81 TABLET, CHEWABLE ORAL at 07:55

## 2025-05-29 RX ADMIN — MEMANTINE 10 MG: 10 TABLET ORAL at 10:36

## 2025-05-29 RX ADMIN — ENOXAPARIN SODIUM 30 MG: 100 INJECTION SUBCUTANEOUS at 07:55

## 2025-05-29 RX ADMIN — SODIUM CHLORIDE, PRESERVATIVE FREE 10 ML: 5 INJECTION INTRAVENOUS at 00:49

## 2025-05-29 RX ADMIN — ATORVASTATIN CALCIUM 80 MG: 40 TABLET, FILM COATED ORAL at 00:50

## 2025-05-29 RX ADMIN — MEMANTINE 10 MG: 10 TABLET ORAL at 21:54

## 2025-05-29 RX ADMIN — SODIUM CHLORIDE, PRESERVATIVE FREE 10 ML: 5 INJECTION INTRAVENOUS at 10:37

## 2025-05-29 ASSESSMENT — ENCOUNTER SYMPTOMS: RESPIRATORY NEGATIVE: 1

## 2025-05-29 ASSESSMENT — PAIN DESCRIPTION - RADICULAR PAIN: RADICULAR_PAIN: ABSENT

## 2025-05-29 ASSESSMENT — PAIN SCALES - GENERAL: PAINLEVEL_OUTOF10: 0

## 2025-05-29 NOTE — PROGRESS NOTES
Hospitalist Progress Note               Daily Progress Note: 2025      Hospital Day: 2     Chief complaint:   Chief Complaint   Patient presents with    Altered Mental Status        Subjective:     Patient is seen today for follow-up. Patient AOX3. Has  aphasia.  at bedside.    answering most of the question. No complaints. No cp, sob, palpitations, n/v.         Medications reviewed  Current Facility-Administered Medications   Medication Dose Route Frequency    donepezil (ARICEPT) tablet 10 mg  10 mg Oral Nightly    memantine (NAMENDA) tablet 10 mg  10 mg Oral BID    mirtazapine (REMERON) tablet 7.5 mg  7.5 mg Oral Nightly    pantoprazole (PROTONIX) tablet 40 mg  40 mg Oral QAM AC    sodium chloride flush 0.9 % injection 5-40 mL  5-40 mL IntraVENous 2 times per day    sodium chloride flush 0.9 % injection 5-40 mL  5-40 mL IntraVENous PRN    0.9 % sodium chloride infusion   IntraVENous PRN    ondansetron (ZOFRAN-ODT) disintegrating tablet 4 mg  4 mg Oral Q8H PRN    Or    ondansetron (ZOFRAN) injection 4 mg  4 mg IntraVENous Q6H PRN    polyethylene glycol (GLYCOLAX) packet 17 g  17 g Oral Daily PRN    enoxaparin Sodium (LOVENOX) injection 30 mg  30 mg SubCUTAneous Daily    atorvastatin (LIPITOR) tablet 80 mg  80 mg Oral Nightly    aspirin chewable tablet 81 mg  81 mg Oral Daily    Or    aspirin suppository 300 mg  300 mg Rectal Daily       Review of Systems:   Pertinent items are noted in HPI.    Objective:   Physical Exam:     BP (!) 175/82   Pulse 79   Temp 98.3 °F (36.8 °C) (Oral)   Resp 18   Ht 1.676 m (5' 6\")   Wt 52.8 kg (116 lb 6.5 oz)   SpO2 100%   BMI 18.79 kg/m²    O2 Device: None (Room air)    Temp (24hrs), Av.3 °F (36.8 °C), Min:98.3 °F (36.8 °C), Max:98.3 °F (36.8 °C)    No intake/output data recorded.    1901 -  0700  In: 500   Out: -     Mode Rate TV Press PEEP FiO2 PIP Min. Vent                              General:   Awake and alert   Lungs:   Clear to  Absolute 2.66 0.80 - 3.50 K/UL    Monocytes Absolute 0.50 0.00 - 1.00 K/UL    Eosinophils Absolute 0.07 0.00 - 0.40 K/UL    Basophils Absolute 0.06 0.00 - 0.10 K/UL    Immature Granulocytes Absolute 0.03 0.00 - 0.04 K/UL    Differential Type AUTOMATED     Comprehensive Metabolic Panel    Collection Time: 05/28/25  9:27 PM   Result Value Ref Range    Sodium 141 136 - 145 mmol/L    Potassium 3.4 (L) 3.5 - 5.1 mmol/L    Chloride 105 97 - 108 mmol/L    CO2 26 21 - 32 mmol/L    Anion Gap 10 2 - 12 mmol/L    Glucose 147 (H) 65 - 100 mg/dL    BUN 20 6 - 20 mg/dL    Creatinine 1.57 (H) 0.55 - 1.02 mg/dL    BUN/Creatinine Ratio 13 12 - 20      Est, Glom Filt Rate 33 (L) >60 ml/min/1.73m2    Calcium 10.1 8.5 - 10.1 mg/dL    Total Bilirubin 0.5 0.2 - 1.0 mg/dL    AST 16 15 - 37 U/L    ALT 11 (L) 12 - 78 U/L    Alk Phosphatase 74 45 - 117 U/L    Total Protein 6.7 6.4 - 8.2 g/dL    Albumin 3.6 3.5 - 5.0 g/dL    Globulin 3.1 2.0 - 4.0 g/dL    Albumin/Globulin Ratio 1.2 1.1 - 2.2     Protime-INR    Collection Time: 05/28/25  9:27 PM   Result Value Ref Range    Protime 13.0 11.9 - 14.6 sec    INR 1.0 0.9 - 1.1     Troponin    Collection Time: 05/28/25  9:27 PM   Result Value Ref Range    Troponin, High Sensitivity 12 0 - 51 ng/L   CK    Collection Time: 05/28/25  9:27 PM   Result Value Ref Range    Total CK 61 26 - 192 U/L   TSH    Collection Time: 05/28/25  9:27 PM   Result Value Ref Range    TSH, 3rd Generation 3.14 0.36 - 3.74 uIU/mL   EKG 12 Lead    Collection Time: 05/28/25 10:07 PM   Result Value Ref Range    Ventricular Rate 65 BPM    Atrial Rate 65 BPM    P-R Interval 176 ms    QRS Duration 86 ms    Q-T Interval 420 ms    QTc Calculation (Bazett) 436 ms    P Axis 104 degrees    R Axis -8 degrees    T Axis 88 degrees    Diagnosis       Normal sinus rhythm  Normal ECG  When compared with ECG of 07-MAY-2025 18:04,  Premature atrial complexes are no longer Present  Confirmed by VIRY BOB ([id number]) on 5/29/2025 11:02:44 AM

## 2025-05-29 NOTE — ED NOTES
No blood thinners noted on pt chart in her home medication list. Reviewed with provider at this time.

## 2025-05-29 NOTE — ED NOTES
ED TO INPATIENT SBAR HANDOFF    Patient Name: Paloma Phillips   Preferred Name: Paloma  : 1940  84 y.o.   Family/Caregiver Present: no   Code Status Order: Full Code  PO Status: NPO:Yes except for meds  Telemetry Order: Yes  C-SSRS: Risk of Suicide: No Risk  Sitter no  na  Restraints:     Sepsis Risk Score Sepsis V2 Risk Score: 10.3    Situation  Chief Complaint   Patient presents with    Altered Mental Status     Brief Description of Patient's Condition: Bubble study echo complete.  Ambulatory: requires direction.  Hx of dementia that precedes recent dx of same.   Mental Status: disoriented  Arrived from:Home  Imaging:   XR CHEST PORTABLE   Final Result   No acute process.      Electronically signed by Russell Cole      CTA HEAD NECK W CONTRAST   Final Result         1. No acute large vessel arterial occlusion. Mild atherosclerosis. No cerebral   perfusion abnormality.   2. Enlarged and nodular right thyroid lobe and isthmus.   3. Moderate to severe multilevel cervical spondylosis.               Electronically signed by Sandra Eduardo      CT BRAIN PERFUSION   Final Result         1. No acute large vessel arterial occlusion. Mild atherosclerosis. No cerebral   perfusion abnormality.   2. Enlarged and nodular right thyroid lobe and isthmus.   3. Moderate to severe multilevel cervical spondylosis.               Electronically signed by Sandra Eduardo      CT HEAD WO CONTRAST   Final Result   No acute intracranial abnormalities.         Electronically signed by RAYMUNDO Kapoor      MRI brain without contrast    (Results Pending)   MRI CERVICAL SPINE WO CONTRAST    (Results Pending)   MRI THORACIC SPINE WO CONTRAST    (Results Pending)     Abnormal labs:   Abnormal Labs Reviewed   CBC WITH AUTO DIFFERENTIAL - Abnormal; Notable for the following components:       Result Value    Hemoglobin 11.4 (*)     RDW 14.6 (*)     All other components within normal limits   COMPREHENSIVE METABOLIC PANEL - Abnormal;

## 2025-05-29 NOTE — H&P
Hospitalist Admission Note    NAME: Paloma Phillips   :  1940   MRN:  [id number]     Date/Time:  2025 11:41 PM    Patient PCP: Iqra Stauffer MD    ______________________________________________________________________  Given the patient's current clinical presentation, I have a high level of concern for decompensation if discharged from the emergency department.  Complex decision making was performed, which includes reviewing the patient's available past medical records, laboratory results, and x-ray films.       My assessment of this patient's clinical condition and my plan of care is as follows.    Assessment / Plan:    Unresponsiveness/syncope, slurred speech, expressive aphasia, rule out stroke  Acute metabolic encephalopathy versus postictal, monitor for seizure  -reviewed CT head/CTA H and neck, no intracranial bleed   -allow permessive hypertension, treat if SBP > 220/120   -At baseline patient is oriented x 3, currently is only oriented to self, could be postictal, as there was urinary incontinence however no generalized clonic tonic seizure noted  -Start statin and aspirin  -ECHO with bubble study   -Obtain MRI of brain  -Tylenol for Fever   -Avoid hypoglycemia   -F/u on HbA1c, Lipid panel   -Will hold off starting antiepileptic  -So far UA and U tox, ammonia level unremarkable  -EEG in the morning  -PT/OT/SLP  - Appreciate teleneuro    Syncope  - CT shows cervical spinal spondylosis  - Neurology recommended to obtain MRI of the cervical spine and the thoracic spine conveyed to the ER physician, unfortunately not mentioned in the official document  - Given possible syncope will obtain MRI of cervical spine and thoracic spine as well    History of dementia  Depression/anxiety  - Continue Namenda and donezepil  -Patient has been coming to the ED for the last few weeks, due to increased lethargy could be sign of dementia advancing  -Will rule out metabolic causes i.e. stroke and  MD        _______________________________________________________________________    TOTAL TIME:  70 Minutes    Critical Care Provided     Minutes non procedure based    Signed: Jessy Page MD    Procedures: see electronic medical records for all procedures/Xrays and details which were not copied into this note but were reviewed prior to creation of Plan.

## 2025-05-29 NOTE — PROGRESS NOTES
Received Order for Telemetry     Paloma Phillips   1940   [id number]   Transient alteration of awareness [R40.4]  TIA (transient ischemic attack) [G45.9]  Stroke-like symptom [R29.90]  Cerebrovascular accident (CVA), unspecified mechanism (HCC) [I63.9]   Anurag Stephens MD     Tele Box # [id number] placed on patient at  1252 pm  ER Room # C1  Admitting to Room 565  Transferring Nurse OJSE  Verified with Primary Nurse RUEL at  1307 pm

## 2025-05-29 NOTE — ED TRIAGE NOTES
PER EMS: Pt came from dinner and was eating and went mute. Pt recently dx with new onset of Dementia. Pt had an incontinent episode at dinner. Mild facial drooping noted on right side upon EMS arrival and slurred speech.      LKW 2040

## 2025-05-29 NOTE — ED PROVIDER NOTES
EMERGENCY DEPARTMENT HISTORY AND PHYSICAL EXAM      Patient Name: Paloma Phillips  MRN: [id number]  YOB: 1940  Date of evaluation: 5/28/2025  Provider: Marko Lee MD     History of Present Illness     Chief Complaint   Patient presents with    Altered Mental Status       History Provided By: EMS,     HPI: Paloma Phillips, 84 y.o. female with past medical history as listed and reviewed below presenting to the ED for evaluation of altered mental status.  Per EMS patient was at dinner with her  when she suddenly went mute, she leaned over to the left side and she urinated on herself.  There is no reports of any shaking.  When EMS arrived the patient was unresponsive to voice not following commands but improved throughout the EMS ride.  On arrival to the ED she is following commands however still confused and slow to respond.  She is unable provide any history.    Per the  he corroborates the above history, denies any shaking.  He agrees that since being in the ED the patient has improved.    Medical History     Past Medical History:  Past Medical History:   Diagnosis Date    Anemia 11/04/2023    Anxiety     Asthma     Chronic renal disease, stage III (HCC) 05/29/2022    Dementia (Formerly [facility])     Hypercholesterolemia     Hypertension     Osteoarthritis, unspecified osteoarthritis type, unspecified site 2/7/2023    Osteoporosis     Situational depression 9/22/2020       Past Surgical History:  Past Surgical History:   Procedure Laterality Date    COLONOSCOPY  11/10/2015    COLONOSCOPY N/A 5/22/2024    COLONOSCOPY performed by Chaparro Vergara MD at [facility] ENDOSCOPY    HYSTERECTOMY (CERVIX STATUS UNKNOWN)      THYROIDECTOMY      UPPER GASTROINTESTINAL ENDOSCOPY N/A 12/20/2023    EGD performed by Chaparro Vergara MD at [facility] ENDOSCOPY    UPPER GASTROINTESTINAL ENDOSCOPY N/A 12/20/2023    EGD BIOPSY performed by Chaparro Vergara MD at [facility] ENDOSCOPY       Family History:  Family History   Problem

## 2025-05-29 NOTE — ED NOTES
Headband: applied  Date/Time: 0545  Recorder: recording started  Skin: intact  Highest Seizure [place] Percentage past hour: 0      General info regarding Seizure [place] %:  Minimum duration of study is 2 hours. If Seizure [place] has remained 0% throughout the entire 2-hour duration, communicate with provider to stop the recording.     Seizure [place] 0-10% - Continue to monitor and complete 2-hour study.  Seizure [place] 11-89% - Epileptiform activity present. Notify provider for next steps.  Seizure [place] >/= 90% - Epileptiform activity consistent with Status Epilepticus. Immediately notify provider.     *Patients with Seizure [place] above 10% that persists may require a study longer than 2 hours. Maximum recording duration is 24 hours. Please update provider with a persistent increase in Seizure [place] above 10%.

## 2025-05-29 NOTE — CARE COORDINATION
05/29/25 1050   Service Assessment   Patient Orientation Alert and Oriented;Person   Cognition Long Term Memory Deficit  (Confusion)   History Provided By Patient;Spouse   Primary Caregiver Spouse   Accompanied By/Relationship    Support Systems Spouse/Significant Other   Patient's Healthcare Decision Maker is: Legal Next of Kin   PCP Verified by CM Yes  (Iqra Stauffer - seen last month.)   Last Visit to PCP Within last 3 months   Prior Functional Level Independent in ADLs/IADLs   Current Functional Level Assistance with the following:;Bathing;Dressing;Toileting;Cooking;Housework;Shopping   Can patient return to prior living arrangement Yes   Ability to make needs known: Fair   Family able to assist with home care needs: Yes   Would you like for me to discuss the discharge plan with any other family members/significant others, and if so, who? Yes  ( Thuan Phillips)   Financial Resources Medicare   Community Resources None   CM/SW Referral Other (see comment)  (None)   Social/Functional History   Lives With Spouse   Type of Home House   Home Layout Two level   Home Access Stairs to enter with rails   Entrance Stairs - Number of Steps 3   Bathroom Shower/Tub Tub/Shower unit   Bathroom Toilet Standard   Bathroom Equipment None   Bathroom Accessibility Accessible   Home Equipment None   Receives Help From Family   Prior Level of Assist for ADLs Needs assistance   Toileting Needs assistance   Prior Level of Assist for Homemaking Needs assistance   Homemaking Responsibilities Yes   Ambulation Assistance Independent   Prior Level of Assist for Transfers Independent   Active  No   Occupation Retired   Discharge Planning   Type of Residence House   Living Arrangements Spouse/Significant Other   Current Services Prior To Admission None   Potential Assistance Needed N/A   DME Ordered? No   Potential Assistance Purchasing Medications No   Type of Home Care Services None   Patient expects to be discharged

## 2025-05-29 NOTE — THERAPY EVALUATION
Speech LAnguage Pathology Dysphagia EVALUATION/DISCHARGE    Patient: Paloma Phillips (84 y.o. female)  Date: 5/29/2025  Primary Diagnosis: Transient alteration of awareness [R40.4]  TIA (transient ischemic attack) [G45.9]  Stroke-like symptom [R29.90]  Cerebrovascular accident (CVA), unspecified mechanism (HCC) [I63.9]       Precautions: Aspiration, fall                  DIET RECOMMENDATIONS: Regular and thin liquids, meds as tolerated,    SWALLOW SAFETY PRECAUTIONS: Rec slow rate of intake, small bites/sips, remain upright 1 hour after PO and do not eat 3 hours before bedtime.     ASSESSMENT :  Based on the objective data described below, the patient presents with largely wfl oropharyngeal swallow fxn.   at bedside reports patient back to herself.  No facial droop or dysarthria. Patient demonstrates cognitive deficits w/ memory, processing, sequencing, attention, and thought organization. Patient benefits from increased processing time, repetition, limited options (field of 2 is best).    again reports this is baseline. Denies receiving cognitive therapy.  Oral phase c/b slow but adequate mastication of solids. Patient able to self feed. Pharyngeal phase c/b minimal swallow delay and HLE is wfl upon palpation.   NO overt s/s of pen/asp observed.   Patient will be discharged from skilled speech-language pathology services at this time.       PLAN :  Recommendations and Planned Interventions:  DIET RECOMMENDATIONS: Regular and thin liquids, meds as tolerated,    SWALLOW SAFETY PRECAUTIONS: Rec slow rate of intake, small bites/sips, remain upright 1 hour after PO and do not eat 3 hours before bedtime.      Acute SLP Services: No, patient will be discharged from acute skilled speech-language pathology at this time.    Discharge Recommendations: No further acute care ST. She may benefit from cognitive evaluation in home environment.     SUBJECTIVE:   Patient reports she needs to use the bathroom.

## 2025-05-30 LAB
ANION GAP SERPL CALC-SCNC: 7 MMOL/L (ref 2–12)
BASOPHILS # BLD: 0.03 K/UL (ref 0–0.1)
BASOPHILS NFR BLD: 0.3 % (ref 0–1)
BUN SERPL-MCNC: 13 MG/DL (ref 6–20)
BUN/CREAT SERPL: 11 (ref 12–20)
CA-I BLD-MCNC: 9.7 MG/DL (ref 8.5–10.1)
CHLORIDE SERPL-SCNC: 98 MMOL/L (ref 97–108)
CO2 SERPL-SCNC: 29 MMOL/L (ref 21–32)
CREAT SERPL-MCNC: 1.2 MG/DL (ref 0.55–1.02)
DIFFERENTIAL METHOD BLD: ABNORMAL
EOSINOPHIL # BLD: 0.05 K/UL (ref 0–0.4)
EOSINOPHIL NFR BLD: 0.5 % (ref 0–7)
ERYTHROCYTE [DISTWIDTH] IN BLOOD BY AUTOMATED COUNT: 14.4 % (ref 11.5–14.5)
GLUCOSE SERPL-MCNC: 113 MG/DL (ref 65–100)
HCT VFR BLD AUTO: 35.1 % (ref 35–47)
HGB BLD-MCNC: 11.6 G/DL (ref 11.5–16)
IMM GRANULOCYTES # BLD AUTO: 0.05 K/UL (ref 0–0.04)
IMM GRANULOCYTES NFR BLD AUTO: 0.5 % (ref 0–0.5)
LYMPHOCYTES # BLD: 1.27 K/UL (ref 0.8–3.5)
LYMPHOCYTES NFR BLD: 13.4 % (ref 12–49)
MCH RBC QN AUTO: 26.5 PG (ref 26–34)
MCHC RBC AUTO-ENTMCNC: 33 G/DL (ref 30–36.5)
MCV RBC AUTO: 80.3 FL (ref 80–99)
MONOCYTES # BLD: 0.68 K/UL (ref 0–1)
MONOCYTES NFR BLD: 7.2 % (ref 5–13)
NEUTS SEG # BLD: 7.38 K/UL (ref 1.8–8)
NEUTS SEG NFR BLD: 78.1 % (ref 32–75)
NRBC # BLD: 0 K/UL (ref 0–0.01)
NRBC BLD-RTO: 0 PER 100 WBC
PLATELET # BLD AUTO: 246 K/UL (ref 150–400)
PMV BLD AUTO: 10.2 FL (ref 8.9–12.9)
POTASSIUM SERPL-SCNC: 2.9 MMOL/L (ref 3.5–5.1)
RBC # BLD AUTO: 4.37 M/UL (ref 3.8–5.2)
SODIUM SERPL-SCNC: 134 MMOL/L (ref 136–145)
WBC # BLD AUTO: 9.5 K/UL (ref 3.6–11)

## 2025-05-30 PROCEDURE — 97535 SELF CARE MNGMENT TRAINING: CPT

## 2025-05-30 PROCEDURE — 6370000000 HC RX 637 (ALT 250 FOR IP)

## 2025-05-30 PROCEDURE — 96372 THER/PROPH/DIAG INJ SC/IM: CPT

## 2025-05-30 PROCEDURE — 97165 OT EVAL LOW COMPLEX 30 MIN: CPT

## 2025-05-30 PROCEDURE — 85025 COMPLETE CBC W/AUTO DIFF WBC: CPT

## 2025-05-30 PROCEDURE — 80048 BASIC METABOLIC PNL TOTAL CA: CPT

## 2025-05-30 PROCEDURE — 36415 COLL VENOUS BLD VENIPUNCTURE: CPT

## 2025-05-30 PROCEDURE — 6370000000 HC RX 637 (ALT 250 FOR IP): Performed by: INTERNAL MEDICINE

## 2025-05-30 PROCEDURE — 6370000000 HC RX 637 (ALT 250 FOR IP): Performed by: PHYSICIAN ASSISTANT

## 2025-05-30 PROCEDURE — 2500000003 HC RX 250 WO HCPCS

## 2025-05-30 PROCEDURE — 97530 THERAPEUTIC ACTIVITIES: CPT

## 2025-05-30 PROCEDURE — G0378 HOSPITAL OBSERVATION PER HR: HCPCS

## 2025-05-30 PROCEDURE — 6360000002 HC RX W HCPCS

## 2025-05-30 PROCEDURE — 97161 PT EVAL LOW COMPLEX 20 MIN: CPT

## 2025-05-30 RX ORDER — POTASSIUM CHLORIDE 1500 MG/1
40 TABLET, EXTENDED RELEASE ORAL EVERY 6 HOURS
Status: COMPLETED | OUTPATIENT
Start: 2025-05-30 | End: 2025-05-31

## 2025-05-30 RX ORDER — TRAMADOL HYDROCHLORIDE 50 MG/1
50 TABLET ORAL EVERY 6 HOURS PRN
Status: DISCONTINUED | OUTPATIENT
Start: 2025-05-30 | End: 2025-05-30

## 2025-05-30 RX ADMIN — POTASSIUM CHLORIDE 40 MEQ: 1500 TABLET, EXTENDED RELEASE ORAL at 18:13

## 2025-05-30 RX ADMIN — SODIUM CHLORIDE, PRESERVATIVE FREE 10 ML: 5 INJECTION INTRAVENOUS at 19:48

## 2025-05-30 RX ADMIN — MIRTAZAPINE 7.5 MG: 15 TABLET, FILM COATED ORAL at 19:47

## 2025-05-30 RX ADMIN — ASPIRIN 81 MG: 81 TABLET, CHEWABLE ORAL at 10:39

## 2025-05-30 RX ADMIN — Medication 3 MG: at 19:47

## 2025-05-30 RX ADMIN — ENOXAPARIN SODIUM 30 MG: 100 INJECTION SUBCUTANEOUS at 10:39

## 2025-05-30 RX ADMIN — MEMANTINE 10 MG: 10 TABLET ORAL at 19:47

## 2025-05-30 RX ADMIN — ATORVASTATIN CALCIUM 80 MG: 40 TABLET, FILM COATED ORAL at 19:47

## 2025-05-30 RX ADMIN — MEMANTINE 10 MG: 10 TABLET ORAL at 10:39

## 2025-05-30 RX ADMIN — DONEPEZIL HYDROCHLORIDE 10 MG: 5 TABLET, FILM COATED ORAL at 19:47

## 2025-05-30 RX ADMIN — SODIUM CHLORIDE, PRESERVATIVE FREE 10 ML: 5 INJECTION INTRAVENOUS at 10:39

## 2025-05-30 RX ADMIN — PANTOPRAZOLE SODIUM 40 MG: 40 TABLET, DELAYED RELEASE ORAL at 06:13

## 2025-05-30 ASSESSMENT — PAIN SCALES - GENERAL: PAINLEVEL_OUTOF10: 2

## 2025-05-30 NOTE — THERAPY EVALUATION
PHYSICAL THERAPY EVALUATION  Patient: Paloma Phillips (84 y.o. female)  Date: 5/30/2025  Primary Diagnosis: Transient alteration of awareness [R40.4]  TIA (transient ischemic attack) [G45.9]  Stroke-like symptom [R29.90]  Cerebrovascular accident (CVA), unspecified mechanism (HCC) [I63.9]       Precautions: Restrictions/Precautions  Restrictions/Precautions: Fall Risk     Recommendations for nursing mobility: Out of bed to chair for meals, Use of bed/chair alarm for safety, AD and gt belt for bed to chair , Amb to bathroom with AD and gait belt, Amb in hallway, and Assist x1    In place during session: EKG/telemetry     ASSESSMENT  Pt is a 84 y.o. female admitted on 5/28/2025 for syncope and less responsive; pt currently being treated for r/o CVA, acute metabolic encephalopathy, syncope, h/o dementia, depression, anxiety, HTN, osteoporosis, and DON on CKD. Head CT showed no acute IC process. Head CTA showed no large vessel arterial occlusion, mild atherosclerosis, no cerebral perfusion abnormality, enlarged and nodular right thyroid lobe and isthmus, and moderate to severe multilevel cervical spondylosis. Pt semi-supine in bed upon PT arrival, agreeable to evaluation. Pt A&O x person, birth day and month only.  1:1 sitter present in room throughout session.    Based on the objective data described below, the patient currently presents with impaired functional mobility, decreased independence in ADLs, decreased ROM, impaired strength, decreased activity tolerance, impaired cognition, decreased command following, poor attention/concentration, impaired balance, and impaired posture. (See below for objective details and assist levels).     Overall pt tolerated session fair today with no c/o pain throughout session. Pt required CGA to min A with additional time for bed mobility and transfers. Pt amb 50 feet with HHA, gt belt and CGA; demonstrates NBOS with steady, step through gt pattern with no LOB or knee buckling  the following DME: continuing to assess with progress       SUBJECTIVE:   Patient stated “I'm doing well.”    OBJECTIVE DATA SUMMARY:   HISTORY:    Past Medical History:   Diagnosis Date    Anemia 11/04/2023    Anxiety     Asthma     Chronic renal disease, stage III (HCC) 05/29/2022    Dementia (HCC)     Hypercholesterolemia     Hypertension     Osteoarthritis, unspecified osteoarthritis type, unspecified site 2/7/2023    Osteoporosis     Situational depression 9/22/2020     Past Surgical History:   Procedure Laterality Date    COLONOSCOPY  11/10/2015    COLONOSCOPY N/A 5/22/2024    COLONOSCOPY performed by Chaparro Vergara MD at [facility] ENDOSCOPY    HYSTERECTOMY (CERVIX STATUS UNKNOWN)      THYROIDECTOMY      UPPER GASTROINTESTINAL ENDOSCOPY N/A 12/20/2023    EGD performed by Chaparro Vergara MD at [facility] ENDOSCOPY    UPPER GASTROINTESTINAL ENDOSCOPY N/A 12/20/2023    EGD BIOPSY performed by Chaparro Vergara MD at [facility] ENDOSCOPY       Home Situation:  Social/Functional History  Lives With: Spouse  Type of Home: House  Home Layout: Two level  Home Access: Stairs to enter with rails  Entrance Stairs - Number of Steps: 3  Bathroom Shower/Tub: Tub/Shower unit  Bathroom Toilet: Standard  Bathroom Equipment: None  Bathroom Accessibility: Accessible  Home Equipment: None  Has the patient had two or more falls in the past year or any fall with injury in the past year?: Unknown  Receives Help From: Family  Prior Level of Assist for ADLs: Needs assistance  Toileting: Needs assistance  Prior Level of Assist for Homemaking: Needs assistance  Homemaking Responsibilities: Yes  Prior Level of Assist for Transfers: Independent  Active : No  Occupation: Retired    Cognitive/Behavioral Status:  Orientation  Overall Orientation Status: Impaired  Orientation Level: Oriented to person  Cognition  Overall Cognitive Status: Exceptions  Arousal/Alertness: Appears intact  Following Commands: Follows one step commands with increased time;Follows

## 2025-05-30 NOTE — CONSULTS
[facility] TELENEUROLOGY CONSULTATION        Impression/Recommendations:   Unresponsive episode   Dementia   Anxiety   Depression   Uncontrolled hypertension    - differential broad since symptoms are not specific   - since blood pressure was in the 190s at ER, recently discontinued aspirin and possible left side weakness during event, could be stroke/TIA, MRI brain pending.  Spinal MRI was done by team as well    - consider seizure, metabolic and cardiac etiologies as well   - EEG rapid was negative   - CTA head and neck negative for high grade stenosis  - holter 30 days as outpatient if inpatient work up is negative   - if MRI is negative, would be ok to discharge   - restart aspirin depending on reason it was recently discontinued,  will check with provider who discontinued      - if has reoccurrence of similar event, would start low dose keppra         Chief Complaint/Admission Diagnosis: Transient alteration of awareness [R40.4]  TIA (transient ischemic attack) [G45.9]  Stroke-like symptom [R29.90]  Cerebrovascular accident (CVA), unspecified mechanism (HCC) [I63.9]     I have been asked to see this 84 y.o. female at [facility] in neurological consultation by Armani Gonzalez MD to render advice and opinion regarding syncope      ?  HPI:   ??   83 yo female with PMH of depression and anxiety who presented after she became less responsive while sitting and started to slump to the left side.  She reported to  prior to episode that she was feeling hot and started to sweat profusely.  No LOC with this event, but patient had similar episode 2 weeks ago where she did have LOC.   At baseline, she has confusion and slowness to respond due to dementia.  has not been checking blood pressure at home.  She takes her medications as prescribed. She was previously on aspirin, but was told to stop due to unknown reason.    ?     Review of Symptoms:     A ten system review of

## 2025-05-30 NOTE — PROGRESS NOTES
Hospitalist Progress Note               Daily Progress Note: 2025      Hospital Day: 3     Chief complaint:   Chief Complaint   Patient presents with    Altered Mental Status        Subjective:   Hospital course to date:    Admitted for altered mental state  --------  Patient is seen today for follow-up. Pleasantly confused. No fevers overnight        Medications reviewed  Current Facility-Administered Medications   Medication Dose Route Frequency    traMADol (ULTRAM) tablet 50 mg  50 mg Oral Q6H PRN    donepezil (ARICEPT) tablet 10 mg  10 mg Oral Nightly    memantine (NAMENDA) tablet 10 mg  10 mg Oral BID    mirtazapine (REMERON) tablet 7.5 mg  7.5 mg Oral Nightly    pantoprazole (PROTONIX) tablet 40 mg  40 mg Oral QAM AC    melatonin tablet 3 mg  3 mg Oral Nightly PRN    sodium chloride flush 0.9 % injection 5-40 mL  5-40 mL IntraVENous 2 times per day    sodium chloride flush 0.9 % injection 5-40 mL  5-40 mL IntraVENous PRN    0.9 % sodium chloride infusion   IntraVENous PRN    ondansetron (ZOFRAN-ODT) disintegrating tablet 4 mg  4 mg Oral Q8H PRN    Or    ondansetron (ZOFRAN) injection 4 mg  4 mg IntraVENous Q6H PRN    polyethylene glycol (GLYCOLAX) packet 17 g  17 g Oral Daily PRN    enoxaparin Sodium (LOVENOX) injection 30 mg  30 mg SubCUTAneous Daily    atorvastatin (LIPITOR) tablet 80 mg  80 mg Oral Nightly    aspirin chewable tablet 81 mg  81 mg Oral Daily    Or    aspirin suppository 300 mg  300 mg Rectal Daily       Review of Systems:   Review of systems not obtained due to patient factors.    Objective:   Physical Exam:     /86   Pulse 72   Temp 97.7 °F (36.5 °C) (Axillary)   Resp 18   Ht 1.676 m (5' 6\")   Wt 52.8 kg (116 lb 6.5 oz)   SpO2 96%   BMI 18.79 kg/m²    O2 Device: None (Room air)    Temp (24hrs), Av.5 °F (36.4 °C), Min:97.3 °F (36.3 °C), Max:97.7 °F (36.5 °C)    No intake/output data recorded.    1901 -  0700  In: 980 [P.O.:480]  Out: -     Mode Rate TV

## 2025-05-30 NOTE — PLAN OF CARE
OCCUPATIONAL THERAPY EVALUATION  Patient: Paloma Phillips (84 y.o. female)  Date: 5/30/2025  Primary Diagnosis: Transient alteration of awareness [R40.4]  TIA (transient ischemic attack) [G45.9]  Stroke-like symptom [R29.90]  Cerebrovascular accident (CVA), unspecified mechanism (HCC) [I63.9]       Precautions: Fall Risk   Recommendations for nursing mobility: Out of bed to chair for meals, Use of bed/chair alarm for safety, Amb to bathroom with AD and gait belt, and Assist x1    In place during session:Peripheral IV and EKG/telemetry   ASSESSMENT  Pt is a 84 y.o. female presenting to [facility] with c/o episode of less responsiveness, possible syncope while at dinner, admitted 5/28/2025 and currently being treated for unresponsiveness/syncope, slurred speech and expressive aphasaia, CVA rule out. CT of head was negative and MRI of brain and cervical/thoracic spines are pending. Pt received semi-supine in bed upon arrival, AXO to self, and agreeable to OT evaluation.  present in room throughout session, is patient's care giver at home.     Based on current observations, pt presents with decreased  functional mobility, independence in ADLs, high-level IADLs, strength, activity tolerance, endurance, safety awareness, cognition, command following, attention/concentration, balance (see below for objective details and assist levels).     Overall, pt tolerates session fair with decreased orientation and requiring significant cueing for participation this date. Patient transferred to EOB and into standing without DME. Ambulatory to bathroom for grooming tasks at sink to brush teeth and wash face. Requiring significant multi modal cueing for participation in ADLs and assist to put adhesive in dentures before re-applying. Patient then ambulatory for short distance in hallway with overall CGA for mobility. Patient's  reports patient is close to her baseline at this time and he feels comfortable caring for patient at  home, patient will do better in a familiar environment. Pt will benefit from continued skilled OT services to address current impairments and improve IND and safety with self cares and functional transfers/mobility. Current OT d/c recommendation No post acute skilled occupational therapy, return to previous living setting once medically appropriate,  declines OT needs at discharge.      GOALS:    Problem: Occupational Therapy - Adult  Goal: By Discharge: Performs self-care activities at highest level of function for planned discharge setting.  See evaluation for individualized goals.  Description: FUNCTIONAL STATUS PRIOR TO ADMISSION:  Patient was ambulatory using no DME,  assists with stairs only. Patient's  reports assisting with roughly 50% of her ADLs daily.    HOME SUPPORT: The patient lived with her  who assists with all ADLs/IADLs.    Occupational Therapy Goals:  Initiated 5/30/2025  1.  Patient will perform grooming standing at sink with Supervision within 7 day(s).  2.  Patient will perform lower body dressing with Moderate Assist within 7 day(s).  3.  Patient will perform bathing with Minimal Assist within 7 day(s).  4.  Patient will perform toilet transfers with Supervision  within 7 day(s).  5.  Patient will perform all aspects of toileting with Supervision within 7 day(s).  6.  Patient will participate in upper extremity therapeutic exercise/activities with Supervision for 5 minutes within 7 day(s).    Outcome: Progressing        PLAN :  Recommendations and Planned Interventions: self care training, therapeutic activities, functional mobility training, balance training, therapeutic exercise, endurance activities, cognitive retraining, patient education, home safety training, and family training/education    Recommend next OT session: LB dressing and standing grooming    Frequency/Duration: Patient will be followed by occupational therapy:  2-3x/week to address

## 2025-05-30 NOTE — PLAN OF CARE
Problem: Safety - Adult  Goal: Free from fall injury  5/30/2025 0251 by Madeleine Cano RN  Outcome: Not Progressing  Flowsheets (Taken 5/29/2025 1946)  Free From Fall Injury:   Instruct family/caregiver on patient safety   Based on caregiver fall risk screen, instruct family/caregiver to ask for assistance with transferring infant if caregiver noted to have fall risk factors  5/29/2025 1545 by Carlee Costello RN  Outcome: Progressing     Problem: Safety - Adult  Goal: Free from fall injury  5/30/2025 0251 by Madeleine Cano RN  Outcome: Not Progressing  Flowsheets (Taken 5/29/2025 1946)  Free From Fall Injury:   Instruct family/caregiver on patient safety   Based on caregiver fall risk screen, instruct family/caregiver to ask for assistance with transferring infant if caregiver noted to have fall risk factors  5/29/2025 1545 by Carlee Costello RN  Outcome: Progressing

## 2025-05-30 NOTE — CARE COORDINATION
CM reviewed chart. DCP home with spouse pending therapy recs when medicallly ready. CM will continue to follow.

## 2025-05-31 VITALS
HEIGHT: 66 IN | WEIGHT: 116.4 LBS | BODY MASS INDEX: 18.71 KG/M2 | OXYGEN SATURATION: 99 % | DIASTOLIC BLOOD PRESSURE: 68 MMHG | HEART RATE: 54 BPM | RESPIRATION RATE: 16 BRPM | TEMPERATURE: 97.9 F | SYSTOLIC BLOOD PRESSURE: 150 MMHG

## 2025-05-31 PROBLEM — R41.82 AMS (ALTERED MENTAL STATUS): Status: ACTIVE | Noted: 2025-05-31

## 2025-05-31 LAB
ANION GAP SERPL CALC-SCNC: 6 MMOL/L (ref 2–12)
BASOPHILS # BLD: 0.02 K/UL (ref 0–0.1)
BASOPHILS NFR BLD: 0.3 % (ref 0–1)
BUN SERPL-MCNC: 18 MG/DL (ref 6–20)
BUN/CREAT SERPL: 14 (ref 12–20)
CA-I BLD-MCNC: 8.6 MG/DL (ref 8.5–10.1)
CHLORIDE SERPL-SCNC: 98 MMOL/L (ref 97–108)
CO2 SERPL-SCNC: 26 MMOL/L (ref 21–32)
CREAT SERPL-MCNC: 1.29 MG/DL (ref 0.55–1.02)
DIFFERENTIAL METHOD BLD: ABNORMAL
EOSINOPHIL # BLD: 0.08 K/UL (ref 0–0.4)
EOSINOPHIL NFR BLD: 1 % (ref 0–7)
ERYTHROCYTE [DISTWIDTH] IN BLOOD BY AUTOMATED COUNT: 14.4 % (ref 11.5–14.5)
GLUCOSE SERPL-MCNC: 91 MG/DL (ref 65–100)
HCT VFR BLD AUTO: 30.7 % (ref 35–47)
HGB BLD-MCNC: 10.3 G/DL (ref 11.5–16)
IMM GRANULOCYTES # BLD AUTO: 0.03 K/UL (ref 0–0.04)
IMM GRANULOCYTES NFR BLD AUTO: 0.4 % (ref 0–0.5)
LYMPHOCYTES # BLD: 1.86 K/UL (ref 0.8–3.5)
LYMPHOCYTES NFR BLD: 24.2 % (ref 12–49)
MCH RBC QN AUTO: 26.5 PG (ref 26–34)
MCHC RBC AUTO-ENTMCNC: 33.6 G/DL (ref 30–36.5)
MCV RBC AUTO: 79.1 FL (ref 80–99)
MONOCYTES # BLD: 0.68 K/UL (ref 0–1)
MONOCYTES NFR BLD: 8.8 % (ref 5–13)
NEUTS SEG # BLD: 5.03 K/UL (ref 1.8–8)
NEUTS SEG NFR BLD: 65.3 % (ref 32–75)
NRBC # BLD: 0 K/UL (ref 0–0.01)
NRBC BLD-RTO: 0 PER 100 WBC
PLATELET # BLD AUTO: 207 K/UL (ref 150–400)
PMV BLD AUTO: 10.4 FL (ref 8.9–12.9)
POTASSIUM SERPL-SCNC: 4.1 MMOL/L (ref 3.5–5.1)
RBC # BLD AUTO: 3.88 M/UL (ref 3.8–5.2)
SODIUM SERPL-SCNC: 130 MMOL/L (ref 136–145)
WBC # BLD AUTO: 7.7 K/UL (ref 3.6–11)

## 2025-05-31 PROCEDURE — 80048 BASIC METABOLIC PNL TOTAL CA: CPT

## 2025-05-31 PROCEDURE — 4A10X4Z MONITORING OF CENTRAL NERVOUS ELECTRICAL ACTIVITY, EXTERNAL APPROACH: ICD-10-PCS | Performed by: INTERNAL MEDICINE

## 2025-05-31 PROCEDURE — 6370000000 HC RX 637 (ALT 250 FOR IP)

## 2025-05-31 PROCEDURE — 2500000003 HC RX 250 WO HCPCS

## 2025-05-31 PROCEDURE — 96361 HYDRATE IV INFUSION ADD-ON: CPT

## 2025-05-31 PROCEDURE — 1100000000 HC RM PRIVATE

## 2025-05-31 PROCEDURE — 36415 COLL VENOUS BLD VENIPUNCTURE: CPT

## 2025-05-31 PROCEDURE — 6370000000 HC RX 637 (ALT 250 FOR IP): Performed by: INTERNAL MEDICINE

## 2025-05-31 PROCEDURE — 2580000003 HC RX 258: Performed by: INTERNAL MEDICINE

## 2025-05-31 PROCEDURE — 6360000002 HC RX W HCPCS

## 2025-05-31 PROCEDURE — 85025 COMPLETE CBC W/AUTO DIFF WBC: CPT

## 2025-05-31 PROCEDURE — G0378 HOSPITAL OBSERVATION PER HR: HCPCS

## 2025-05-31 RX ORDER — AMLODIPINE BESYLATE 5 MG/1
10 TABLET ORAL DAILY
Qty: 90 TABLET | Refills: 2 | Status: SHIPPED | OUTPATIENT
Start: 2025-05-31

## 2025-05-31 RX ORDER — ASPIRIN 81 MG/1
81 TABLET, CHEWABLE ORAL DAILY
Qty: 30 TABLET | Refills: 3 | Status: SHIPPED | OUTPATIENT
Start: 2025-06-01

## 2025-05-31 RX ORDER — 0.9 % SODIUM CHLORIDE 0.9 %
500 INTRAVENOUS SOLUTION INTRAVENOUS ONCE
Status: COMPLETED | OUTPATIENT
Start: 2025-05-31 | End: 2025-05-31

## 2025-05-31 RX ADMIN — ASPIRIN 81 MG: 81 TABLET, CHEWABLE ORAL at 10:02

## 2025-05-31 RX ADMIN — POTASSIUM CHLORIDE 40 MEQ: 1500 TABLET, EXTENDED RELEASE ORAL at 01:44

## 2025-05-31 RX ADMIN — SODIUM CHLORIDE 500 ML: 0.9 INJECTION, SOLUTION INTRAVENOUS at 12:11

## 2025-05-31 RX ADMIN — ENOXAPARIN SODIUM 30 MG: 100 INJECTION SUBCUTANEOUS at 10:02

## 2025-05-31 RX ADMIN — SODIUM CHLORIDE, PRESERVATIVE FREE 10 ML: 5 INJECTION INTRAVENOUS at 10:02

## 2025-05-31 RX ADMIN — PANTOPRAZOLE SODIUM 40 MG: 40 TABLET, DELAYED RELEASE ORAL at 06:25

## 2025-05-31 RX ADMIN — POTASSIUM CHLORIDE 40 MEQ: 1500 TABLET, EXTENDED RELEASE ORAL at 06:25

## 2025-05-31 RX ADMIN — MEMANTINE 10 MG: 10 TABLET ORAL at 10:01

## 2025-05-31 ASSESSMENT — PAIN SCALES - GENERAL: PAINLEVEL_OUTOF10: 0

## 2025-05-31 NOTE — DISCHARGE SUMMARY
Physician Discharge Summary     Patient ID:    Paloma Phillips  [id number]  84 y.o.  1940    Admit date: 5/28/2025    Discharge date : 5/31/2025      Final Diagnoses:   Acute metabolic encephalopathy  Advancing Alzheimer's dementia  Syncope of unclear etiology  History of depression  Essential hypertension  Hyponatremia  Hypokalemia.  Repleted  Acute kidney injury secondary to dehydration.  Resolved    Reason for Hospitalization:    Alteration in mentation and decreased responsiveness      Hospital Course:     84-year-old lady with history of advanced Alzheimer's dementia brought in to the hospital by family with reports of decreased responsiveness.  CT scan of the brain without contrast unremarkable.  Follow-up MRI of the brain negative for acute infarct.  Patient was monitored neurologically in the hospital without any acute events.  An MRI of the thoracic and lower cervical spine obtained and results pending.  2D echocardiogram with bubble study negative.  Normal ejection fraction without wall motion abnormalities.  Patient is at baseline and clinically stable for discharge.  Updates were provided to  at bedside.  She will discharge home today.  Home with home health for medication reconciliation and skilled nursing.      Discharge Medications:      Medication List        START taking these medications      aspirin 81 MG chewable tablet  Take 1 tablet by mouth daily  Start taking on: June 1, 2025            CHANGE how you take these medications      amLODIPine 5 MG tablet  Commonly known as: NORVASC  Take 2 tablets by mouth daily  What changed: how much to take            CONTINUE taking these medications      acetaminophen 650 MG extended release tablet  Commonly known as: TYLENOL  TAKE 1 TABLET BY MOUTH TWICE A DAY AS NEEDED FOR PAIN     alendronate 70 MG tablet  Commonly known as: FOSAMAX  Take 1 tablet by mouth every 7 days     atorvastatin 10 MG tablet  Commonly known as:  LIPITOR  TAKE 1 TABLET BY MOUTH EVERY DAY AT NIGHT     calcium citrate 250 MG Tabs tablet  Commonly known as: CALCITRATE  Take 2 tablets by mouth daily     CENTRUM SILVER 50+WOMEN PO     donepezil 10 MG tablet  Commonly known as: ARICEPT     Klor-Con M10 10 MEQ extended release tablet  Generic drug: potassium chloride  TAKE 1 TABLET BY MOUTH TWICE A DAY     * memantine 10 MG tablet  Commonly known as: NAMENDA     mirtazapine 7.5 MG tablet  Commonly known as: REMERON  TAKE 1 TABLET BY MOUTH EVERY DAY AT NIGHT     omeprazole 20 MG delayed release capsule  Commonly known as: PRILOSEC  TAKE 1 CAPSULE BY MOUTH 2 TIMES DAILY (BEFORE MEALS).           * This list has 1 medication(s) that are the same as other medications prescribed for you. Read the directions carefully, and ask your doctor or other care provider to review them with you.                STOP taking these medications      diclofenac sodium 1 % Gel  Commonly known as: VOLTAREN     losartan-hydroCHLOROthiazide 50-12.5 MG per tablet  Commonly known as: HYZAAR     metoprolol succinate 25 MG extended release tablet  Commonly known as: TOPROL XL            ASK your doctor about these medications      Cholecalciferol 10 MCG (400 UNIT) Caps Capsule  Commonly known as: Vitamin D  Take 1 capsule by mouth daily     * memantine ER 28 MG Cp24 extended release capsule  Commonly known as: NAMENDA XR     ondansetron 4 MG disintegrating tablet  Commonly known as: ZOFRAN-ODT  Take 1 tablet by mouth 3 times daily as needed for Nausea or Vomiting           * This list has 1 medication(s) that are the same as other medications prescribed for you. Read the directions carefully, and ask your doctor or other care provider to review them with you.                   Where to Get Your Medications        These medications were sent to [facility]/pharmacy [address] [place] [address] - [address] [facility] - P [phone] - F [phone]  [address] [facility] [address]      Phone:

## 2025-05-31 NOTE — PLAN OF CARE
Problem: Safety - Adult  Goal: Free from fall injury  Outcome: Progressing  Flowsheets (Taken 5/30/2025 2000)  Free From Fall Injury: Instruct family/caregiver on patient safety     Problem: Discharge Planning  Goal: Discharge to home or other facility with appropriate resources  Outcome: Progressing  Flowsheets (Taken 5/30/2025 2000)  Discharge to home or other facility with appropriate resources: Identify barriers to discharge with patient and caregiver     Problem: Physical Therapy - Adult  Goal: By Discharge: Performs mobility at highest level of function for planned discharge setting.  See evaluation for individualized goals.  Description: FUNCTIONAL STATUS PRIOR TO ADMISSION: Pt unable to provided PLOF at this time, per chart pt resides with spouse in a 2 story home and required assistance for ADLs prior to admission due to baseline dementia.     Physical Therapy Goals  Initiated 5/30/2025  Pt stated goal: to get up  Pt will be I with LE HEP in 7 days.  Pt will perform bed mobility with Eden in 7 days.  Pt will perform transfers with Eden in 7 days.   Pt will amb  feet with LRAD safely with modified Eden in 7 days.  Pt will ascend/descend steps with Contact Guard Assist in 7 days to safely enter/navigate home.   Pt will demonstrate improvement in standing balance from Minimal Assist to Modified Eden in 7 days.      5/30/2025 0911 by Pari Joya, PT  Outcome: Progressing     Problem: Skin/Tissue Integrity  Goal: Skin integrity remains intact  Description: 1.  Monitor for areas of redness and/or skin breakdown2.  Assess vascular access sites hourly3.  Every 4-6 hours minimum:  Change oxygen saturation probe site4.  Every 4-6 hours:  If on nasal continuous positive airway pressure, respiratory therapy assess nares and determine need for appliance change or resting period  Outcome: Progressing  Flowsheets (Taken 5/30/2025 2000)  Skin Integrity Remains Intact:

## 2025-05-31 NOTE — PLAN OF CARE
Problem: Safety - Adult  Goal: Free from fall injury  Outcome: Progressing     Problem: Discharge Planning  Goal: Discharge to home or other facility with appropriate resources  Outcome: Progressing  Flowsheets (Taken 5/31/2025 1007)  Discharge to home or other facility with appropriate resources: Identify barriers to discharge with patient and caregiver     Problem: Skin/Tissue Integrity  Goal: Skin integrity remains intact  Description: 1.  Monitor for areas of redness and/or skin breakdown2.  Assess vascular access sites hourly3.  Every 4-6 hours minimum:  Change oxygen saturation probe site4.  Every 4-6 hours:  If on nasal continuous positive airway pressure, respiratory therapy assess nares and determine need for appliance change or resting period  Outcome: Progressing  Flowsheets (Taken 5/31/2025 1007)  Skin Integrity Remains Intact: Monitor for areas of redness and/or skin breakdown     Problem: ABCDS Injury Assessment  Goal: Absence of physical injury  Outcome: Progressing     Problem: Pain  Goal: Verbalizes/displays adequate comfort level or baseline comfort level  Outcome: Progressing

## 2025-05-31 NOTE — PROGRESS NOTES
Hospitalist Progress Note               Daily Progress Note: 2025      Hospital Day: 4     Chief complaint:   Chief Complaint   Patient presents with    Altered Mental Status        Subjective:   Hospital course to date:    Admitted for altered mental state  --------  Patient is seen today for follow-up. Pleasantly confused. No fevers overnight. MRI brain negative for acute CVA        Medications reviewed  Current Facility-Administered Medications   Medication Dose Route Frequency    sodium chloride 0.9 % bolus 500 mL  500 mL IntraVENous Once    donepezil (ARICEPT) tablet 10 mg  10 mg Oral Nightly    memantine (NAMENDA) tablet 10 mg  10 mg Oral BID    mirtazapine (REMERON) tablet 7.5 mg  7.5 mg Oral Nightly    pantoprazole (PROTONIX) tablet 40 mg  40 mg Oral QAM AC    melatonin tablet 3 mg  3 mg Oral Nightly PRN    sodium chloride flush 0.9 % injection 5-40 mL  5-40 mL IntraVENous 2 times per day    sodium chloride flush 0.9 % injection 5-40 mL  5-40 mL IntraVENous PRN    0.9 % sodium chloride infusion   IntraVENous PRN    ondansetron (ZOFRAN-ODT) disintegrating tablet 4 mg  4 mg Oral Q8H PRN    Or    ondansetron (ZOFRAN) injection 4 mg  4 mg IntraVENous Q6H PRN    polyethylene glycol (GLYCOLAX) packet 17 g  17 g Oral Daily PRN    enoxaparin Sodium (LOVENOX) injection 30 mg  30 mg SubCUTAneous Daily    atorvastatin (LIPITOR) tablet 80 mg  80 mg Oral Nightly    aspirin chewable tablet 81 mg  81 mg Oral Daily    Or    aspirin suppository 300 mg  300 mg Rectal Daily       Review of Systems:   Review of systems not obtained due to patient factors.    Objective:   Physical Exam:     /65   Pulse 66   Temp 98.4 °F (36.9 °C) (Oral)   Resp 18   Ht 1.676 m (5' 6\")   Wt 52.8 kg (116 lb 6.5 oz)   SpO2 99%   BMI 18.79 kg/m²    O2 Device: None (Room air)    Temp (24hrs), Av.1 °F (36.7 °C), Min:97.7 °F (36.5 °C), Max:98.4 °F (36.9 °C)    No intake/output data recorded.    1901 -  0700  In: 720

## 2025-05-31 NOTE — CARE COORDINATION
Transition of Care Plan:    RUR:   Prior Level of Functioning:   Disposition: Home w/HH via [facility]  Follow up appointments: PCP and all other's   Transportation at discharge: Spouse  IM/IMM Medicare/ letter given: Medicare pt has received, reviewed, and signed 2nd IM letter informing them of their right to appeal the discharge.  Signed copy has been placed on pt bedside chart.  Is patient a [place] and connected with [facility]? N/A  If yes, was  transfer form completed and [facility] notified? N/A  Caregiver Contact:   Discharge Caregiver contacted prior to discharge? Spouse, Thuan Phillips @ [phone].    Care Conference needed?   Barriers to discharge:     HH via [facility].       SHAYNA Tse

## 2025-05-31 NOTE — PROGRESS NOTES
Pt discharge home w/.  Discharge AVS paperwork reviewed w/pt and pt .  Transported pt via wheelchair to main entrance.  Pt assisted into car.

## 2025-05-31 NOTE — CONSULTS
[facility] NEUROLOGY CONSULTATION          Chief Complaint/Admission Diagnosis: Transient alteration of awareness [R40.4]  TIA (transient ischemic attack) [G45.9]  Stroke-like symptom [R29.90]  Cerebrovascular accident (CVA), unspecified mechanism (HCC) [I63.9]  AMS (altered mental status) [R41.82]     I have been asked to see this 84 y.o. female in neurological consultation by Armani Garcia MD  to render advice and opinion regarding weakness.     ?     Impression/Recommendations:   85 yo female with PMH of depression and anxiety being evaluated for left sided weakness. MRI brain did not show any stroke. MRI of C and T spine does not show any myelopathy.  Patient is delirious.   Moves all extremities spontaneously.  Patient has metabolic encephalopathy  Would recommend correcting sodium slowly at the rate of only 12 mEq/L per day to avoid any osmotic demyelination syndrome.   Will let primary team evaluate hyponatremia.  Delirium precautions.  Call if question.            Su Page MD  Teleneurologist    HPI:   History was obtained from a review of the electronic record and from the patient and family.??     ?     Review of Symptoms:     A ten system review of constitutional, cardiovascular, respiratory, musculoskeletal, endocrine, skin, HEENT, genitourinary, neurological, and psychiatric systems was obtained and is negative except as noted above in HPI.     ?    Neurological Examination:   /65   Pulse 66   Temp 98.4 °F (36.9 °C) (Oral)   Resp 18   Ht 1.676 m (5' 6\")   Wt 52.8 kg (116 lb 6.5 oz)   SpO2 99%   BMI 18.79 kg/m²    Assisted by INESSA Diehl    Patient is delirious.   Moves all extremities spontaneously.      ?        PMH:   Past Medical History:   Diagnosis Date    Anemia 11/04/2023    Anxiety     Asthma     Chronic renal disease, stage III (HCC) 05/29/2022    Dementia (HCC)     Hypercholesterolemia     Hypertension     Osteoarthritis, unspecified osteoarthritis type,

## 2025-06-09 ENCOUNTER — HOSPITAL ENCOUNTER (OUTPATIENT)
Facility: HOSPITAL | Age: 85
Setting detail: INFUSION SERIES
Discharge: HOME | End: 2025-06-09
Payer: MEDICARE

## 2025-06-09 VITALS
RESPIRATION RATE: 16 BRPM | TEMPERATURE: 98 F | DIASTOLIC BLOOD PRESSURE: 79 MMHG | HEART RATE: 70 BPM | OXYGEN SATURATION: 98 % | SYSTOLIC BLOOD PRESSURE: 138 MMHG

## 2025-06-09 LAB
MAGNESIUM SERPL-MCNC: 1.9 MG/DL (ref 1.6–2.4)
PHOSPHATE SERPL-MCNC: 2.2 MG/DL (ref 2.6–4.7)

## 2025-06-09 PROCEDURE — 36415 COLL VENOUS BLD VENIPUNCTURE: CPT

## 2025-06-09 PROCEDURE — 6360000002 HC RX W HCPCS: Performed by: STUDENT IN AN ORGANIZED HEALTH CARE EDUCATION/TRAINING PROGRAM

## 2025-06-09 PROCEDURE — 84100 ASSAY OF PHOSPHORUS: CPT

## 2025-06-09 PROCEDURE — 96372 THER/PROPH/DIAG INJ SC/IM: CPT

## 2025-06-09 PROCEDURE — 83735 ASSAY OF MAGNESIUM: CPT

## 2025-06-09 RX ADMIN — DENOSUMAB 60 MG: 60 INJECTION SUBCUTANEOUS at 13:10

## 2025-06-09 ASSESSMENT — PAIN SCALES - GENERAL: PAINLEVEL_OUTOF10: 0

## 2025-06-09 NOTE — PROGRESS NOTES
Prolia injection given as ordered in left arm without difficulty. Patient tolerated well. Next scheduled appointment Dec. 11 2025 at 1 pm. Discharge instructions reviewed with patient and .

## 2025-06-10 ENCOUNTER — OFFICE VISIT (OUTPATIENT)
Facility: CLINIC | Age: 85
End: 2025-06-10
Payer: MEDICARE

## 2025-06-10 VITALS
HEART RATE: 61 BPM | SYSTOLIC BLOOD PRESSURE: 130 MMHG | RESPIRATION RATE: 16 BRPM | BODY MASS INDEX: 18.8 KG/M2 | OXYGEN SATURATION: 94 % | DIASTOLIC BLOOD PRESSURE: 80 MMHG | HEIGHT: 66 IN | TEMPERATURE: 98 F | WEIGHT: 117 LBS

## 2025-06-10 DIAGNOSIS — N18.31 STAGE 3A CHRONIC KIDNEY DISEASE (HCC): ICD-10-CM

## 2025-06-10 DIAGNOSIS — F43.21 SITUATIONAL DEPRESSION: ICD-10-CM

## 2025-06-10 DIAGNOSIS — M85.89 OSTEOPENIA OF MULTIPLE SITES: ICD-10-CM

## 2025-06-10 DIAGNOSIS — F03.C0 SEVERE DEMENTIA WITHOUT BEHAVIORAL DISTURBANCE, PSYCHOTIC DISTURBANCE, MOOD DISTURBANCE, OR ANXIETY, UNSPECIFIED DEMENTIA TYPE (HCC): ICD-10-CM

## 2025-06-10 DIAGNOSIS — I10 PRIMARY HYPERTENSION: ICD-10-CM

## 2025-06-10 DIAGNOSIS — M81.6 LOCALIZED OSTEOPOROSIS WITHOUT CURRENT PATHOLOGICAL FRACTURE: ICD-10-CM

## 2025-06-10 DIAGNOSIS — Z71.89 ACP (ADVANCE CARE PLANNING): ICD-10-CM

## 2025-06-10 DIAGNOSIS — E87.1 HYPONATREMIA: ICD-10-CM

## 2025-06-10 DIAGNOSIS — F41.8 SITUATIONAL ANXIETY: ICD-10-CM

## 2025-06-10 DIAGNOSIS — M15.0 PRIMARY OSTEOARTHRITIS INVOLVING MULTIPLE JOINTS: ICD-10-CM

## 2025-06-10 DIAGNOSIS — E78.00 PURE HYPERCHOLESTEROLEMIA: ICD-10-CM

## 2025-06-10 PROCEDURE — G8399 PT W/DXA RESULTS DOCUMENT: HCPCS | Performed by: INTERNAL MEDICINE

## 2025-06-10 PROCEDURE — 1160F RVW MEDS BY RX/DR IN RCRD: CPT | Performed by: INTERNAL MEDICINE

## 2025-06-10 PROCEDURE — 1123F ACP DISCUSS/DSCN MKR DOCD: CPT | Performed by: INTERNAL MEDICINE

## 2025-06-10 PROCEDURE — 1036F TOBACCO NON-USER: CPT | Performed by: INTERNAL MEDICINE

## 2025-06-10 PROCEDURE — G8427 DOCREV CUR MEDS BY ELIG CLIN: HCPCS | Performed by: INTERNAL MEDICINE

## 2025-06-10 PROCEDURE — 1111F DSCHRG MED/CURRENT MED MERGE: CPT | Performed by: INTERNAL MEDICINE

## 2025-06-10 PROCEDURE — 1126F AMNT PAIN NOTED NONE PRSNT: CPT | Performed by: INTERNAL MEDICINE

## 2025-06-10 PROCEDURE — 3079F DIAST BP 80-89 MM HG: CPT | Performed by: INTERNAL MEDICINE

## 2025-06-10 PROCEDURE — 3074F SYST BP LT 130 MM HG: CPT | Performed by: INTERNAL MEDICINE

## 2025-06-10 PROCEDURE — G8420 CALC BMI NORM PARAMETERS: HCPCS | Performed by: INTERNAL MEDICINE

## 2025-06-10 PROCEDURE — 1090F PRES/ABSN URINE INCON ASSESS: CPT | Performed by: INTERNAL MEDICINE

## 2025-06-10 PROCEDURE — 1159F MED LIST DOCD IN RCRD: CPT | Performed by: INTERNAL MEDICINE

## 2025-06-10 PROCEDURE — 99214 OFFICE O/P EST MOD 30 MIN: CPT | Performed by: INTERNAL MEDICINE

## 2025-06-10 ASSESSMENT — ENCOUNTER SYMPTOMS
GASTROINTESTINAL NEGATIVE: 1
RESPIRATORY NEGATIVE: 1
ALLERGIC/IMMUNOLOGIC NEGATIVE: 1

## 2025-06-10 NOTE — PROGRESS NOTES
Chief Complaint   Patient presents with    Follow-up Chronic Condition       /84 (BP Site: Left Upper Arm, Patient Position: Sitting)   Pulse 61   Temp 98 °F (36.7 °C)   Resp 16   Ht 1.676 m (5' 6\")   Wt 53.1 kg (117 lb)   SpO2 94%   BMI 18.88 kg/m²       Have you been to the ER, urgent care clinic since your last visit?  Hospitalized since your last visit?   YES - When: approximately 3  weeks ago.  Where and Why: [facility]    Have you seen or consulted any other health care providers outside our system since your last visit?   NO

## 2025-06-10 NOTE — PROGRESS NOTES
Paloma Phillips (: 1940) is a 84 y.o. female, Established patient patient, here for evaluation of the following chief complaint(s):  Follow-up Chronic Condition         ASSESSMENT/PLAN:  Below is the assessment and plan developed based on review of pertinent history, physical exam, labs, studies, and medications.      1. Primary hypertension  2. Severe dementia without behavioral disturbance, psychotic disturbance, mood disturbance, or anxiety, unspecified dementia type (HCC)  -     Ambulatory Referral to Care Management  3. Hyponatremia  -     Basic Metabolic Panel; Future  -     Ambulatory Referral to Care Management  4. Localized osteoporosis without current pathological fracture  -     Ambulatory Referral to Care Management  5. Stage 3a chronic kidney disease (HCC)  -     [facility] -  Referral to ACP Clinical Specialist  -     CBC with Auto Differential; Future  -     Basic Metabolic Panel; Future  -     Ambulatory Referral to Care Management  6. Pure hypercholesterolemia  -     Ambulatory Referral to Care Management  7. Osteopenia of multiple sites  8. Situational depression  -     [facility] -  Referral to ACP Clinical Specialist  9. Primary osteoarthritis involving multiple joints  -     [facility] -  Referral to ACP Clinical Specialist  10. Situational anxiety  -     [facility] -  Referral to ACP Clinical Specialist  -     Ambulatory Referral to Care Management  11. ACP (advance care planning)  -     [facility] -  Referral to [facility] Clinical Specialist    Ms. Phillips does not drive.  She is not able to answer my questions.  She walks unassisted.  She is brought by her  who is aware with the prognosis and diagnosis of dementia that is worsening for her and last month she was admitted and I reviewed the notes and lab results imaging results and the discharge notes from the hospital    Hypertension, controlled, continue current dose of amlodipine.    Hypercholesteremia getting atorvastatin.    Severe dementia which is

## 2025-06-11 ENCOUNTER — RESULTS FOLLOW-UP (OUTPATIENT)
Facility: CLINIC | Age: 85
End: 2025-06-11

## 2025-06-11 ENCOUNTER — CLINICAL DOCUMENTATION (OUTPATIENT)
Age: 85
End: 2025-06-11

## 2025-06-11 LAB
BASOPHILS # BLD AUTO: 0.1 X10E3/UL (ref 0–0.2)
BASOPHILS NFR BLD AUTO: 1 %
BUN SERPL-MCNC: 14 MG/DL (ref 8–27)
BUN/CREAT SERPL: 10 (ref 12–28)
CALCIUM SERPL-MCNC: 9.6 MG/DL (ref 8.7–10.3)
CHLORIDE SERPL-SCNC: 103 MMOL/L (ref 96–106)
CO2 SERPL-SCNC: 24 MMOL/L (ref 20–29)
CREAT SERPL-MCNC: 1.47 MG/DL (ref 0.57–1)
EGFRCR SERPLBLD CKD-EPI 2021: 35 ML/MIN/1.73
EOSINOPHIL # BLD AUTO: 0.1 X10E3/UL (ref 0–0.4)
EOSINOPHIL NFR BLD AUTO: 2 %
ERYTHROCYTE [DISTWIDTH] IN BLOOD BY AUTOMATED COUNT: 14.8 % (ref 11.7–15.4)
GLUCOSE SERPL-MCNC: 84 MG/DL (ref 70–99)
HCT VFR BLD AUTO: 34.6 % (ref 34–46.6)
HGB BLD-MCNC: 10.9 G/DL (ref 11.1–15.9)
IMM GRANULOCYTES # BLD AUTO: 0 X10E3/UL (ref 0–0.1)
IMM GRANULOCYTES NFR BLD AUTO: 0 %
LYMPHOCYTES # BLD AUTO: 2.2 X10E3/UL (ref 0.7–3.1)
LYMPHOCYTES NFR BLD AUTO: 30 %
MCH RBC QN AUTO: 26.8 PG (ref 26.6–33)
MCHC RBC AUTO-ENTMCNC: 31.5 G/DL (ref 31.5–35.7)
MCV RBC AUTO: 85 FL (ref 79–97)
MONOCYTES # BLD AUTO: 0.4 X10E3/UL (ref 0.1–0.9)
MONOCYTES NFR BLD AUTO: 6 %
NEUTROPHILS # BLD AUTO: 4.4 X10E3/UL (ref 1.4–7)
NEUTROPHILS NFR BLD AUTO: 61 %
PLATELET # BLD AUTO: 311 X10E3/UL (ref 150–450)
POTASSIUM SERPL-SCNC: 4 MMOL/L (ref 3.5–5.2)
RBC # BLD AUTO: 4.07 X10E6/UL (ref 3.77–5.28)
SODIUM SERPL-SCNC: 141 MMOL/L (ref 134–144)
WBC # BLD AUTO: 7.3 X10E3/UL (ref 3.4–10.8)

## 2025-06-11 NOTE — ACP (ADVANCE CARE PLANNING)
Advance Care Planning   Ambulatory ACP Specialist Patient Outreach    Date:  6/11/2025    ACP Specialist:  Shanae Daniel    Outreach call to patient in follow-up to ACP Specialist referral from:Iqra Stauffer MD    [x] PCP  [] Provider   [] Ambulatory Care Management [] Other     For:                  [x] Advance Directive Assistance              [] Complete Portable DNR order              [] Complete POST/POLST/MOST              [x] Code Status Discussion             [x] Discuss Goals of Care             [x] Early ACP Decision-Making              [] Other (Specify)    Date Referral Received: 6/10/2025    Next Step:   [x] ACP scheduled conversation  [] Outreach again in one week               [] Email / Mail ACP Info Sheets  [] Email / Mail Advance Directive   [] Closing referral.  Routing closure to referring provider/staff and to ACP Specialist .    [] Closure letter mailed to patient with invitation to contact ACP Specialist if / when ready.   [] Other (Specify here):       [x] At this time, Healthcare Decision Maker Is:         Primary Decision Maker: Thuan Phillips - Spouse - [phone]      [] Primary agent named in scanned advance directive.    [x] Legal Next of Kin.     [] Unable to determine legal decision maker at this time.    Outreaches:       [x] 1st -  Date:  6/11/2025               Intervention:  [] Spoke with Patient   [] Left Voice mail [] Email / Mail    [] MyChart  [x] Other (Specify) : Spoke with patient's spouse    Outcomes:  Writer attempted ACP outreach to patient's spouse at [phone] per provider - spoke to Mr. Phillips.  Mr. Phillips is agreeable to a conversation with ACP Specialist Alyce Mejia on Tuesday, 6/17/2025 at 12:30 PM to discuss ACP options for patient.         Thank you for this referral.

## 2025-06-17 ENCOUNTER — CLINICAL DOCUMENTATION (OUTPATIENT)
Age: 85
End: 2025-06-17

## 2025-06-17 NOTE — ACP (ADVANCE CARE PLANNING)
Advance Care Planning       Advanced Steps Advance Care Planning Conversation  POLST (Goals of Care for Serious Illness and/or Frailty)        Date of conversation: 06/17/25  Location: [place]  Length (minutes): 30 minutes    Advanced Steps® ACP Facilitator: Arline Mejia LCSW    Participants:    [] Patient - Pt with dementia diagnosis and unable to make her own medical decisions. Pt was not present for this ACP appointment. Pt's spouse, Thuan Phillips, is legal next of kin and primary health care agent.    Name: Thuan Phillips         Relationship to Patient:Spouse                                                                  Phone number: [phone]    Healthcare Decision Maker:    Primary Decision Maker: Thuan Phillips - Spouse - [phone]     Conversation Topics  (If Patient does not have decision making capacity, Agent/Surrogate responds based on understanding of how patient would respond if capable)    Understanding of Medical Condition/s AND Potential Complications:    Patient response: n/a  Healthcare Agent/Other Surrogate:  Spouse aware of patient's dementia diagnosis and reports that patient's health has been declining over the past year.     Patient's hospital experiences that may influence future decision making:  n/a    Identifies the following as important for living well (quality of life):  Spouse reports that patient has difficulty with making decisions and needs assistance with activities of daily living.  Spouse feels that patient being able to wake up, get out of bed, assist with dressing herself and eating on her own would show that patient is \"living well\".    Hopes:  Spouse reports that he hopes that patient can continue living with limited interruptions to her daily life.    Worries/Fears about Medical Condition:  Spouse reports worries about ensuring patient has appropriate caregivers if he should pass away prior to patient.  Spouse reports that they have 4 adult children and

## 2025-06-26 ENCOUNTER — CLINICAL DOCUMENTATION (OUTPATIENT)
Age: 85
End: 2025-06-26

## 2025-06-27 ENCOUNTER — TRANSCRIBE ORDERS (OUTPATIENT)
Facility: HOSPITAL | Age: 85
End: 2025-06-27

## 2025-06-27 DIAGNOSIS — N18.32 CHRONIC KIDNEY DISEASE (CKD) STAGE G3B/A1, MODERATELY DECREASED GLOMERULAR FILTRATION RATE (GFR) BETWEEN 30-44 ML/MIN/1.73 SQUARE METER AND ALBUMINURIA CREATININE RATIO LESS THAN 30 MG/G (HCC): Primary | ICD-10-CM

## 2025-07-02 ENCOUNTER — TELEPHONE (OUTPATIENT)
Dept: PHARMACY | Facility: CLINIC | Age: 85
End: 2025-07-02

## 2025-07-02 NOTE — TELEPHONE ENCOUNTER
CLINICAL PHARMACY NOTE - Population Health Pharmacy Referral    Patient can be scheduled with:  Team Schedule- General Referrals, etc.    Received a referral from: Care Coordinator to discuss patient’s medications. Called patient to schedule a time to speak with a pharmacist over the telephone.     No answer left VM on mobile phone: Please contact us at  [phone] option 1 to schedule this appointment.    Home phone uses call filtering on [phone]   Unable to reach    If patient returns call please schedule on patients phone [phone]    Patient is located in [place].  Please schedule Monday-Thursday 8AM-4PM with Lisa Younger or Leatha for licensing purposes.      Aminah Beasley CP.   [facility] Clinical   [facility]  Toll free: [phone] Option 1

## 2025-07-02 NOTE — TELEPHONE ENCOUNTER
----- Message from INESSA KRISHNA RN sent at 7/2/2025  4:44 PM EDT -----  Regarding: Referral  Spouse reports that he has noticed the patient is sleeping more during the day over the past 2 to 3 weeks and is inquiring whether any of the patient's current medications may be contributing to this. ACM inquired if any new medications were added or if existing medication doses were adjusted around the time this change was first noticed; spouse confirmed that new medications were added around that time, but he is unsure which ones, as he does not have the patient's current medications available during this call. ACM offered a referral to the Ambulatory Clinical Pharmacy Team for a medication review; spouse accepted. Referral sent today; spouse notified.      #Spouse is point of contact; please call the spouse's mobile telephone number.#

## 2025-07-07 NOTE — TELEPHONE ENCOUNTER
2nd attempt to contact this patient regarding the previous message  CLINICAL PHARMACY: REFERRAL  Called husbands phone [phone], no answer and now mailbox full.  Unable to call [phone]        Will route to PharmD for review and response to care coordinator      For Pharmacy Admin Tracking Only    Program: MiniTime  CPA in place:  No  Gap Closed?: No   Time Spent (min): 20

## 2025-07-08 ENCOUNTER — TELEPHONE (OUTPATIENT)
Facility: CLINIC | Age: 85
End: 2025-07-08

## 2025-07-08 ENCOUNTER — HOSPITAL ENCOUNTER (EMERGENCY)
Facility: HOSPITAL | Age: 85
Discharge: HOME | End: 2025-07-09
Attending: EMERGENCY MEDICINE
Payer: MEDICARE

## 2025-07-08 DIAGNOSIS — E86.0 DEHYDRATION: Primary | ICD-10-CM

## 2025-07-08 LAB
ALBUMIN SERPL-MCNC: 3.9 G/DL (ref 3.5–5)
ALBUMIN/GLOB SERPL: 1.1 (ref 1.1–2.2)
ALP SERPL-CCNC: 72 U/L (ref 45–117)
ALT SERPL-CCNC: 12 U/L (ref 12–78)
ANION GAP SERPL CALC-SCNC: 9 MMOL/L (ref 2–12)
APPEARANCE UR: CLEAR
AST SERPL W P-5'-P-CCNC: 15 U/L (ref 15–37)
BACTERIA URNS QL MICRO: NEGATIVE /HPF
BASOPHILS # BLD: 0.03 K/UL (ref 0–0.1)
BASOPHILS NFR BLD: 0.5 % (ref 0–1)
BILIRUB SERPL-MCNC: 0.3 MG/DL (ref 0.2–1)
BILIRUB UR QL: NEGATIVE
BUN SERPL-MCNC: 20 MG/DL (ref 6–20)
BUN/CREAT SERPL: 13 (ref 12–20)
CA-I BLD-MCNC: 9.5 MG/DL (ref 8.5–10.1)
CHLORIDE SERPL-SCNC: 106 MMOL/L (ref 97–108)
CO2 SERPL-SCNC: 27 MMOL/L (ref 21–32)
COLOR UR: ABNORMAL
CREAT SERPL-MCNC: 1.5 MG/DL (ref 0.55–1.02)
DIFFERENTIAL METHOD BLD: ABNORMAL
EOSINOPHIL # BLD: 0.09 K/UL (ref 0–0.4)
EOSINOPHIL NFR BLD: 1.4 % (ref 0–7)
EPITH CASTS URNS QL MICRO: ABNORMAL /LPF
ERYTHROCYTE [DISTWIDTH] IN BLOOD BY AUTOMATED COUNT: 15.7 % (ref 11.5–14.5)
GLOBULIN SER CALC-MCNC: 3.7 G/DL (ref 2–4)
GLUCOSE SERPL-MCNC: 90 MG/DL (ref 65–100)
GLUCOSE UR STRIP.AUTO-MCNC: 50 MG/DL
HCT VFR BLD AUTO: 38 % (ref 35–47)
HGB BLD-MCNC: 12.2 G/DL (ref 11.5–16)
HGB UR QL STRIP: NEGATIVE
IMM GRANULOCYTES # BLD AUTO: 0.01 K/UL (ref 0–0.04)
IMM GRANULOCYTES NFR BLD AUTO: 0.2 % (ref 0–0.5)
KETONES UR QL STRIP.AUTO: NEGATIVE MG/DL
LEUKOCYTE ESTERASE UR QL STRIP.AUTO: ABNORMAL
LYMPHOCYTES # BLD: 1.83 K/UL (ref 0.8–3.5)
LYMPHOCYTES NFR BLD: 29.2 % (ref 12–49)
MAGNESIUM SERPL-MCNC: 2.3 MG/DL (ref 1.6–2.4)
MCH RBC QN AUTO: 26.8 PG (ref 26–34)
MCHC RBC AUTO-ENTMCNC: 32.1 G/DL (ref 30–36.5)
MCV RBC AUTO: 83.5 FL (ref 80–99)
MONOCYTES # BLD: 0.43 K/UL (ref 0–1)
MONOCYTES NFR BLD: 6.9 % (ref 5–13)
NEUTS SEG # BLD: 3.88 K/UL (ref 1.8–8)
NEUTS SEG NFR BLD: 61.8 % (ref 32–75)
NITRITE UR QL STRIP.AUTO: NEGATIVE
NRBC # BLD: 0 K/UL (ref 0–0.01)
NRBC BLD-RTO: 0 PER 100 WBC
PH UR STRIP: 7 (ref 5–8)
PLATELET # BLD AUTO: 282 K/UL (ref 150–400)
PMV BLD AUTO: 10.2 FL (ref 8.9–12.9)
POTASSIUM SERPL-SCNC: 3.9 MMOL/L (ref 3.5–5.1)
PROT SERPL-MCNC: 7.6 G/DL (ref 6.4–8.2)
PROT UR STRIP-MCNC: NEGATIVE MG/DL
RBC # BLD AUTO: 4.55 M/UL (ref 3.8–5.2)
RBC #/AREA URNS HPF: ABNORMAL /HPF (ref 0–5)
SODIUM SERPL-SCNC: 142 MMOL/L (ref 136–145)
SP GR UR REFRACTOMETRY: <1.005 (ref 1–1.03)
TROPONIN I SERPL HS-MCNC: 9 NG/L (ref 0–51)
URINE CULTURE IF INDICATED: ABNORMAL
UROBILINOGEN UR QL STRIP.AUTO: 0.1 EU/DL (ref 0.1–1)
WBC # BLD AUTO: 6.3 K/UL (ref 3.6–11)
WBC URNS QL MICRO: ABNORMAL /HPF (ref 0–4)

## 2025-07-08 PROCEDURE — 87154 CUL TYP ID BLD PTHGN 6+ TRGT: CPT

## 2025-07-08 PROCEDURE — 96361 HYDRATE IV INFUSION ADD-ON: CPT

## 2025-07-08 PROCEDURE — 96360 HYDRATION IV INFUSION INIT: CPT

## 2025-07-08 PROCEDURE — 80053 COMPREHEN METABOLIC PANEL: CPT

## 2025-07-08 PROCEDURE — 2580000003 HC RX 258: Performed by: EMERGENCY MEDICINE

## 2025-07-08 PROCEDURE — 36415 COLL VENOUS BLD VENIPUNCTURE: CPT

## 2025-07-08 PROCEDURE — 83735 ASSAY OF MAGNESIUM: CPT

## 2025-07-08 PROCEDURE — 85025 COMPLETE CBC W/AUTO DIFF WBC: CPT

## 2025-07-08 PROCEDURE — 87040 BLOOD CULTURE FOR BACTERIA: CPT

## 2025-07-08 PROCEDURE — 87077 CULTURE AEROBIC IDENTIFY: CPT

## 2025-07-08 PROCEDURE — 99284 EMERGENCY DEPT VISIT MOD MDM: CPT

## 2025-07-08 PROCEDURE — 81001 URINALYSIS AUTO W/SCOPE: CPT

## 2025-07-08 PROCEDURE — 84484 ASSAY OF TROPONIN QUANT: CPT

## 2025-07-08 RX ORDER — 0.9 % SODIUM CHLORIDE 0.9 %
1000 INTRAVENOUS SOLUTION INTRAVENOUS ONCE
Status: COMPLETED | OUTPATIENT
Start: 2025-07-08 | End: 2025-07-09

## 2025-07-08 RX ADMIN — SODIUM CHLORIDE 1000 ML: 0.9 INJECTION, SOLUTION INTRAVENOUS at 21:47

## 2025-07-08 ASSESSMENT — PAIN SCALES - GENERAL: PAINLEVEL_OUTOF10: 0

## 2025-07-08 NOTE — TELEPHONE ENCOUNTER
Agnieszka from [facility] called and said the patient's bp is 134/82 on left side and 140/90 on right side.  Weight is 112,was 120 2 weeks ago. Is also requesting order for docusate prn Also, she has additional complaints of patient sleeping all the time.       Please advise.

## 2025-07-08 NOTE — TELEPHONE ENCOUNTER
Referral sent from [facility]:  \"Spouse reports that he has noticed the patient is sleeping more during the day over the past 2 to 3 weeks and is inquiring whether any of the patient's current medications may be contributing to this. ACM inquired if any new medications were added or if existing medication doses were adjusted around the time this change was first noticed; spouse confirmed that new medications were added around that time, but he is unsure which ones, as he does not have the patient's current medications available during this call. [facility] offered a referral to the Ambulatory Clinical Pharmacy Team for a medication review; spouse accepted. Referral sent today; spouse notified.       #Spouse is point of contact; please call the spouse's mobile telephone number.#\"    Pharmacist's notes:  Per chart review:  Patient was in the ER 5/7/25 for \"altered mental status\" and \"dehydration.\"  5/28/25-5/31/25 admitted for \"Stroke-like symptoms.\" Per discharge summary, \"CT scan of the brain without contrast unremarkable. Follow-up MRI of the brain negative for acute infarct. \" Aspirin started, amlodipine increased, Hyzaar and Toprol XL stopped.    Patient follows with:  Neurology- Dr. Page prescribes Namenda and Aricept   Namenda possibly changed from 10 mg BID to 28 mg ER daily 3/25/25 per reconcile dispense history in [facility]  Aricept possibly changed from 10 mg daily to BID 3/25/25 per reconcile dispense history in [facility]  Endo- Fosamax, calcium and Vitamin D  PCP prescribes Remeron- started 11/9/23. Sertraline stopped. 8/23/24 office visit note \"She has weight gain of about 12 pounds since May 2024 after being on mirtazapine from 117 to 129 pounds. \"  Remeron can cause drowsiness.  Not a new medication- Patient has been on since 11/9/23 and is on lowest dose.    Plan  Would like to complete medication reconciliation to make sure medication list up to date.  Appears Aricept dose may have been increased 3/25/25 by neurology.  This

## 2025-07-08 NOTE — TELEPHONE ENCOUNTER
I called Mr. Larose/ of Ms. Dow since I got the message from home health nurse and Mr. Larose that she is not eating and drinking and she remains sleepy all the time,    My differential diagnosis is dehydration with electrolyte disturbance with worsening kidney function and    Since Aricept and Namenda is not helping he should contact neurologist Dr. Page tomorrow until that time I will advised to take half the dose of current dose of Aricept and Namenda and gradually to stop since it is not helping and she has very poor appetite.  Mr. Larose agreed.

## 2025-07-08 NOTE — ED TRIAGE NOTES
Patient bib spouse for concern dehydration, not eating/drinking as much. PCP recommended to come in      primarily answering questions, patient alert but history of dementia    Denies pain, n/v/d or fevers

## 2025-07-09 VITALS
TEMPERATURE: 98.2 F | HEIGHT: 66 IN | OXYGEN SATURATION: 100 % | BODY MASS INDEX: 18.96 KG/M2 | RESPIRATION RATE: 16 BRPM | SYSTOLIC BLOOD PRESSURE: 167 MMHG | DIASTOLIC BLOOD PRESSURE: 92 MMHG | HEART RATE: 65 BPM | WEIGHT: 118 LBS

## 2025-07-09 PROCEDURE — 96361 HYDRATE IV INFUSION ADD-ON: CPT

## 2025-07-09 ASSESSMENT — PAIN SCALES - GENERAL: PAINLEVEL_OUTOF10: 0

## 2025-07-09 NOTE — DISCHARGE INSTRUCTIONS
Thank you for choosing our Emergency Department for your care.  It is our privilege to care for you in your time of need.  In the next several days, you may receive a survey via email or mailed to your home about your experience with our team.  We would greatly appreciate you taking a few minutes to complete the survey, as we use this information to learn what we have done well and what we could be doing better. Thank you for trusting us with your care!    Below you will find a list of your tests from today's visit.   Labs and Radiology Studies  Recent Results (from the past 12 hours)   CBC with Auto Differential    Collection Time: 07/08/25  9:40 PM   Result Value Ref Range    WBC 6.3 3.6 - 11.0 K/uL    RBC 4.55 3.80 - 5.20 M/uL    Hemoglobin 12.2 11.5 - 16.0 g/dL    Hematocrit 38.0 35.0 - 47.0 %    MCV 83.5 80.0 - 99.0 FL    MCH 26.8 26.0 - 34.0 PG    MCHC 32.1 30.0 - 36.5 g/dL    RDW 15.7 (H) 11.5 - 14.5 %    Platelets 282 150 - 400 K/uL    MPV 10.2 8.9 - 12.9 FL    Nucleated RBCs 0.0 0.0  WBC    nRBC 0.00 0.00 - 0.01 K/uL    Neutrophils % 61.8 32.0 - 75.0 %    Lymphocytes % 29.2 12.0 - 49.0 %    Monocytes % 6.9 5.0 - 13.0 %    Eosinophils % 1.4 0.0 - 7.0 %    Basophils % 0.5 0.0 - 1.0 %    Immature Granulocytes % 0.2 0 - 0.5 %    Neutrophils Absolute 3.88 1.80 - 8.00 K/UL    Lymphocytes Absolute 1.83 0.80 - 3.50 K/UL    Monocytes Absolute 0.43 0.00 - 1.00 K/UL    Eosinophils Absolute 0.09 0.00 - 0.40 K/UL    Basophils Absolute 0.03 0.00 - 0.10 K/UL    Immature Granulocytes Absolute 0.01 0.00 - 0.04 K/UL    Differential Type AUTOMATED     Comprehensive Metabolic Panel    Collection Time: 07/08/25  9:40 PM   Result Value Ref Range    Sodium 142 136 - 145 mmol/L    Potassium 3.9 3.5 - 5.1 mmol/L    Chloride 106 97 - 108 mmol/L    CO2 27 21 - 32 mmol/L    Anion Gap 9 2 - 12 mmol/L    Glucose 90 65 - 100 mg/dL    BUN 20 6 - 20 mg/dL    Creatinine 1.50 (H) 0.55 - 1.02 mg/dL    BUN/Creatinine Ratio 13 12 -

## 2025-07-09 NOTE — ED PROVIDER NOTES
[facility] EMERGENCY DEPT  EMERGENCY DEPARTMENT HISTORY AND PHYSICAL EXAM      Date of evaluation: 7/8/2025  Patient Name: Paloma Phillips  Birthdate 1940  MRN: [id number]  ED Provider: Patricia Horta MD   Note Started: 9:46 PM EDT 7/8/25    HISTORY OF PRESENT ILLNESS     Chief Complaint   Patient presents with    Dehydration           Fatigue       History Provided By: Patient, only     HPI: Paloma Phillips is a 84 y.o. female past medical history of anemia, asthma, CKD, dementia, hypertension hyperlipidemia presents for evaluation of dehydration and fatigue.  Family says for the last 3 days she has been sleeping a lot more than normal.  Does not have much of an appetite either.  She does have home health who expressed concern that she could be dehydrated.  Patient's PCP recommended she be evaluated in the emergency department.  She has not had any vomiting or diarrhea.  No fevers.    PAST MEDICAL HISTORY   Past Medical History:  Past Medical History:   Diagnosis Date    Anemia 11/04/2023    Anxiety     Asthma     Chronic renal disease, stage III (HCC) 05/29/2022    Dementia ([facility])     Hypercholesterolemia     Hypertension     Osteoarthritis, unspecified osteoarthritis type, unspecified site 2/7/2023    Osteoporosis     Situational depression 9/22/2020       Past Surgical History:  Past Surgical History:   Procedure Laterality Date    COLONOSCOPY  11/10/2015    COLONOSCOPY N/A 5/22/2024    COLONOSCOPY performed by Chaparro Vergara MD at [facility] ENDOSCOPY    HYSTERECTOMY (CERVIX STATUS UNKNOWN)      THYROIDECTOMY      UPPER GASTROINTESTINAL ENDOSCOPY N/A 12/20/2023    EGD performed by Chaparro Vergara MD at [facility] ENDOSCOPY    UPPER GASTROINTESTINAL ENDOSCOPY N/A 12/20/2023    EGD BIOPSY performed by Chaparro Vergara MD at [facility] ENDOSCOPY       Family History:  Family History   Problem Relation Age of Onset    Alzheimer's Disease Mother     Dementia Mother     Heart Disease Maternal Grandmother        Social History:  Social

## 2025-07-09 NOTE — TELEPHONE ENCOUNTER
Lucy Payton RN,    Thank you for the referral. We have been unable to reach patient or spouse this past week.  If you reach him, feel free to give him our toll free number to call back- Department, toll free: [phone], option 1.    No further outreach planned by PharmD at this time.    Thank you,  Lisa Leon PharmD, [facility]   Department, toll free: [phone], option 1  ========================================================     Another attempt to contact spouse. No answer. Able to leave message on home phone.        For Pharmacy Admin Tracking Only  Program: [employer]  CPA in place:  No  Time Spent (min): 45

## 2025-07-10 LAB
ACCESSION NUMBER, LLC1M: 89
ACINETOBACTER CALCOAC BAUMANNII COMPLEX BY PCR: NOT DETECTED
BACTEROIDES FRAGILIS BY PCR: NOT DETECTED
BIOFIRE TEST COMMENT: ABNORMAL
CANDIDA ALBICANS BY PCR: NOT DETECTED
CANDIDA AURIS BY PCR: NOT DETECTED
CANDIDA GLABRATA: NOT DETECTED
CANDIDA KRUSEI BY PCR: NOT DETECTED
CANDIDA PARAPSILOSIS BY PCR: NOT DETECTED
CANDIDA TROPICALIS BY PCR: NOT DETECTED
CRYPTOCOCCUS NEOFORMANS/GATTII BY PCR: NOT DETECTED
ENTEROBACTER CLOACAE COMPLEX BY PCR: NOT DETECTED
ENTEROBACTERALES BY PCR: NOT DETECTED
ENTEROCOCCUS FAECALIS BY PCR: NOT DETECTED
ENTEROCOCCUS FAECIUM BY PCR: NOT DETECTED
ESCHERICHIA COLI: NOT DETECTED
HAEM INFLU DNA BLD POS QL NAA+NON-PROBE: NOT DETECTED
KLEBSIELLA AEROGENES BY PCR: NOT DETECTED
KLEBSIELLA OXYTOCA BY PCR: NOT DETECTED
KLEBSIELLA PNEUMONIAE GROUP BY PCR: NOT DETECTED
LISTERIA MONOCYTOGENES BY PCR: NOT DETECTED
NEISSERIA MENINGITIDIS BY PCR: NOT DETECTED
PROTEUS BY PCR: NOT DETECTED
PSEUDOMONAS AERUGINOSA, PSAEP: NOT DETECTED
RESISTANT GENE TARGETS: ABNORMAL
SALMONELLA DNA BLD POS QL NAA+NON-PROBE: NOT DETECTED
SERRATIA MARCESCENS BY PCR: NOT DETECTED
STAPHYLOCOCCUS AUREUS: NOT DETECTED
STAPHYLOCOCCUS EPIDERMIDIS BY PCR: NOT DETECTED
STAPHYLOCOCCUS LUGDUNENSIS BY PCR: NOT DETECTED
STAPHYLOCOCCUS: DETECTED
STENOTROPHOMONAS MALTOPHILIA BY PCR: NOT DETECTED
STREPTOCOCCUS AGALACTIAE (GROUP B): NOT DETECTED
STREPTOCOCCUS PNEUMONIAE , SPNP: NOT DETECTED
STREPTOCOCCUS PYOGENES (GROUP A), SPYOP: NOT DETECTED
STREPTOCOCCUS: NOT DETECTED

## 2025-07-11 ENCOUNTER — HOSPITAL ENCOUNTER (EMERGENCY)
Facility: HOSPITAL | Age: 85
Discharge: HOME | End: 2025-07-11
Attending: STUDENT IN AN ORGANIZED HEALTH CARE EDUCATION/TRAINING PROGRAM
Payer: MEDICARE

## 2025-07-11 ENCOUNTER — CLINICAL DOCUMENTATION (OUTPATIENT)
Age: 85
End: 2025-07-11

## 2025-07-11 VITALS
TEMPERATURE: 98.4 F | SYSTOLIC BLOOD PRESSURE: 148 MMHG | OXYGEN SATURATION: 100 % | HEART RATE: 80 BPM | DIASTOLIC BLOOD PRESSURE: 90 MMHG | RESPIRATION RATE: 16 BRPM

## 2025-07-11 DIAGNOSIS — R78.81 POSITIVE BLOOD CULTURES: Primary | ICD-10-CM

## 2025-07-11 LAB
ALBUMIN SERPL-MCNC: 3.5 G/DL (ref 3.5–5)
ALBUMIN/GLOB SERPL: 1.1 (ref 1.1–2.2)
ALP SERPL-CCNC: 69 U/L (ref 45–117)
ALT SERPL-CCNC: 11 U/L (ref 12–78)
ANION GAP SERPL CALC-SCNC: 7 MMOL/L (ref 2–12)
AST SERPL W P-5'-P-CCNC: ABNORMAL U/L (ref 15–37)
BACTERIA SPEC CULT: ABNORMAL
BACTERIA SPEC CULT: ABNORMAL
BASOPHILS # BLD: 0.04 K/UL (ref 0–0.1)
BASOPHILS NFR BLD: 0.6 % (ref 0–1)
BILIRUB SERPL-MCNC: 0.4 MG/DL (ref 0.2–1)
BUN SERPL-MCNC: 13 MG/DL (ref 6–20)
BUN/CREAT SERPL: 10 (ref 12–20)
CA-I BLD-MCNC: 9.8 MG/DL (ref 8.5–10.1)
CHLORIDE SERPL-SCNC: 112 MMOL/L (ref 97–108)
CO2 SERPL-SCNC: 24 MMOL/L (ref 21–32)
CREAT SERPL-MCNC: 1.35 MG/DL (ref 0.55–1.02)
DIFFERENTIAL METHOD BLD: ABNORMAL
EOSINOPHIL # BLD: 0.13 K/UL (ref 0–0.4)
EOSINOPHIL NFR BLD: 1.9 % (ref 0–7)
ERYTHROCYTE [DISTWIDTH] IN BLOOD BY AUTOMATED COUNT: 15.8 % (ref 11.5–14.5)
GLOBULIN SER CALC-MCNC: 3.3 G/DL (ref 2–4)
GLUCOSE SERPL-MCNC: 96 MG/DL (ref 65–100)
HCT VFR BLD AUTO: 32.5 % (ref 35–47)
HGB BLD-MCNC: 10.3 G/DL (ref 11.5–16)
IMM GRANULOCYTES # BLD AUTO: 0.01 K/UL (ref 0–0.04)
IMM GRANULOCYTES NFR BLD AUTO: 0.1 % (ref 0–0.5)
LACTATE BLD-SCNC: 0.64 MMOL/L (ref 0.4–2)
LYMPHOCYTES # BLD: 1.9 K/UL (ref 0.8–3.5)
LYMPHOCYTES NFR BLD: 27.6 % (ref 12–49)
Lab: ABNORMAL
MCH RBC QN AUTO: 26.3 PG (ref 26–34)
MCHC RBC AUTO-ENTMCNC: 31.7 G/DL (ref 30–36.5)
MCV RBC AUTO: 83.1 FL (ref 80–99)
MONOCYTES # BLD: 0.43 K/UL (ref 0–1)
MONOCYTES NFR BLD: 6.2 % (ref 5–13)
NEUTS SEG # BLD: 4.38 K/UL (ref 1.8–8)
NEUTS SEG NFR BLD: 63.6 % (ref 32–75)
NRBC # BLD: 0 K/UL (ref 0–0.01)
NRBC BLD-RTO: 0 PER 100 WBC
PERFORMED BY:: NORMAL
PLATELET # BLD AUTO: 273 K/UL (ref 150–400)
PMV BLD AUTO: 10.5 FL (ref 8.9–12.9)
POTASSIUM SERPL-SCNC: ABNORMAL MMOL/L (ref 3.5–5.1)
PROT SERPL-MCNC: 6.8 G/DL (ref 6.4–8.2)
RBC # BLD AUTO: 3.91 M/UL (ref 3.8–5.2)
SODIUM SERPL-SCNC: 143 MMOL/L (ref 136–145)
WBC # BLD AUTO: 6.9 K/UL (ref 3.6–11)

## 2025-07-11 PROCEDURE — 36415 COLL VENOUS BLD VENIPUNCTURE: CPT

## 2025-07-11 PROCEDURE — 87040 BLOOD CULTURE FOR BACTERIA: CPT

## 2025-07-11 PROCEDURE — 80053 COMPREHEN METABOLIC PANEL: CPT

## 2025-07-11 PROCEDURE — 99283 EMERGENCY DEPT VISIT LOW MDM: CPT

## 2025-07-11 PROCEDURE — 85025 COMPLETE CBC W/AUTO DIFF WBC: CPT

## 2025-07-11 PROCEDURE — 83605 ASSAY OF LACTIC ACID: CPT

## 2025-07-11 NOTE — ED PROVIDER NOTES
[facility] EMERGENCY DEPT  EMERGENCY DEPARTMENT HISTORY AND PHYSICAL EXAM      Date of evaluation: 7/11/2025  Patient Name: Paloma Phillips  Birthdate 1940  MRN: [id number]  ED Provider: Regino Farmer MD   Note Started: 7:36 PM EDT 7/11/25    HISTORY OF PRESENT ILLNESS     Chief Complaint   Patient presents with    blood culture       History Provided By: Patient, spouse     HPI: Paloma Phillips is a 84 y.o. female with past medical history as reviewed below presents for evaluation of positive blood cultures.  Patient recently evaluated for fatigue, blood cultures were sent and then have resulted positive.  She has been doing well at home, feeling much better.  No complaints.    PAST MEDICAL HISTORY   Past Medical History:  Past Medical History:   Diagnosis Date    Anemia 11/04/2023    Anxiety     Asthma     Chronic renal disease, stage III (HCC) 05/29/2022    Dementia ([facility])     Hypercholesterolemia     Hypertension     Osteoarthritis, unspecified osteoarthritis type, unspecified site 2/7/2023    Osteoporosis     Situational depression 9/22/2020       Past Surgical History:  Past Surgical History:   Procedure Laterality Date    COLONOSCOPY  11/10/2015    COLONOSCOPY N/A 5/22/2024    COLONOSCOPY performed by Chaparro Vergara MD at [facility] ENDOSCOPY    HYSTERECTOMY (CERVIX STATUS UNKNOWN)      THYROIDECTOMY      UPPER GASTROINTESTINAL ENDOSCOPY N/A 12/20/2023    EGD performed by Chaparro Vergara MD at [facility] ENDOSCOPY    UPPER GASTROINTESTINAL ENDOSCOPY N/A 12/20/2023    EGD BIOPSY performed by Chaparro Vergara MD at [facility] ENDOSCOPY       Family History:  Family History   Problem Relation Age of Onset    Alzheimer's Disease Mother     Dementia Mother     Heart Disease Maternal Grandmother        Social History:  Social History     Tobacco Use    Smoking status: Former     Current packs/day: 0.00     Average packs/day: 0.3 packs/day for 9.0 years (2.3 ttl pk-yrs)     Types: Cigarettes     Start date: 1/1/2002     Quit  Index  [BQ] Regino Farmer MD       Records Reviewed: Prior medical records and Nursing notes. See ED Course for summary.   Vitals:    Vitals:    07/11/25 1840   BP: (!) 157/91   Pulse: 81   Resp: 16   Temp: 98.1 °F (36.7 °C)   SpO2: 100%        Clinical Management Tools:      Smoking Cessation:     Patient was given the following medications:  Medications - No data to display    CONSULTS: See ED Course/MDM for further details.  None   PROCEDURES   Unless otherwise noted above, none  Procedures      SEPSIS REASSESSMENT & CRITICAL CARE TIME   SEPSIS REASSESSMENT:       CLINICAL IMPRESSIONS     1. Positive blood cultures         SDOH/DISPOSITION/PLAN   Social Determinants affecting Treatment Plan: Patient has concerns for health literacy. Additional time provided in explanation    DISPOSITION               Discharge Note: The patient is stable for discharge home. The signs, symptoms, diagnosis, and discharge instructions have been discussed, understanding conveyed, and agreed upon. The patient is to follow up as recommended or return to ER should their symptoms worsen.      PATIENT REFERRED TO:  No follow-up provider specified.      DISCHARGE MEDICATIONS:     Medication List        ASK your doctor about these medications      acetaminophen 650 MG extended release tablet  Commonly known as: TYLENOL  TAKE 1 TABLET BY MOUTH TWICE A DAY AS NEEDED FOR PAIN     alendronate 70 MG tablet  Commonly known as: FOSAMAX  Take 1 tablet by mouth every 7 days     amLODIPine 5 MG tablet  Commonly known as: NORVASC  Take 2 tablets by mouth daily     aspirin 81 MG chewable tablet  Take 1 tablet by mouth daily     atorvastatin 10 MG tablet  Commonly known as: LIPITOR  TAKE 1 TABLET BY MOUTH EVERY DAY AT NIGHT     calcium citrate 250 MG Tabs tablet  Commonly known as: CALCITRATE  Take 2 tablets by mouth daily     CENTRUM SILVER 50+WOMEN PO     Cholecalciferol 10 MCG (400 UNIT) Caps Capsule  Commonly known as: Vitamin D  Take 1

## 2025-07-12 LAB
BACTERIA SPEC CULT: ABNORMAL
BACTERIA SPEC CULT: ABNORMAL
Lab: ABNORMAL

## 2025-07-12 NOTE — DISCHARGE INSTRUCTIONS
Thank you for choosing our Emergency Department for your care.  It is our privilege to care for you in your time of need.  In the next several days, you may receive a survey via email or mailed to your home about your experience with our team.  We would greatly appreciate you taking a few minutes to complete the survey, as we use this information to learn what we have done well and what we could be doing better. Thank you for trusting us with your care!    Below you will find a list of your tests from today's visit.   Labs and Radiology Studies  Recent Results (from the past 12 hours)   CBC with Auto Differential    Collection Time: 07/11/25  7:19 PM   Result Value Ref Range    WBC 6.9 3.6 - 11.0 K/uL    RBC 3.91 3.80 - 5.20 M/uL    Hemoglobin 10.3 (L) 11.5 - 16.0 g/dL    Hematocrit 32.5 (L) 35.0 - 47.0 %    MCV 83.1 80.0 - 99.0 FL    MCH 26.3 26.0 - 34.0 PG    MCHC 31.7 30.0 - 36.5 g/dL    RDW 15.8 (H) 11.5 - 14.5 %    Platelets 273 150 - 400 K/uL    MPV 10.5 8.9 - 12.9 FL    Nucleated RBCs 0.0 0.0  WBC    nRBC 0.00 0.00 - 0.01 K/uL    Neutrophils % 63.6 32.0 - 75.0 %    Lymphocytes % 27.6 12.0 - 49.0 %    Monocytes % 6.2 5.0 - 13.0 %    Eosinophils % 1.9 0.0 - 7.0 %    Basophils % 0.6 0.0 - 1.0 %    Immature Granulocytes % 0.1 0 - 0.5 %    Neutrophils Absolute 4.38 1.80 - 8.00 K/UL    Lymphocytes Absolute 1.90 0.80 - 3.50 K/UL    Monocytes Absolute 0.43 0.00 - 1.00 K/UL    Eosinophils Absolute 0.13 0.00 - 0.40 K/UL    Basophils Absolute 0.04 0.00 - 0.10 K/UL    Immature Granulocytes Absolute 0.01 0.00 - 0.04 K/UL    Differential Type AUTOMATED     Comprehensive Metabolic Panel    Collection Time: 07/11/25  7:19 PM   Result Value Ref Range    Sodium 143 136 - 145 mmol/L    Potassium Hemolyzed, Recollection Recommended 3.5 - 5.1 mmol/L    Chloride 112 (H) 97 - 108 mmol/L    CO2 24 21 - 32 mmol/L    Anion Gap 7 2 - 12 mmol/L    Glucose 96 65 - 100 mg/dL    BUN 13 6 - 20 mg/dL    Creatinine 1.35 (H) 0.55 -

## 2025-07-13 LAB
BACTERIA SPEC CULT: NORMAL
BACTERIA SPEC CULT: NORMAL
Lab: NORMAL
Lab: NORMAL

## 2025-07-17 LAB
BACTERIA SPEC CULT: NORMAL
BACTERIA SPEC CULT: NORMAL
Lab: NORMAL
Lab: NORMAL

## 2025-07-18 ENCOUNTER — TELEPHONE (OUTPATIENT)
Facility: CLINIC | Age: 85
End: 2025-07-18

## 2025-07-18 NOTE — TELEPHONE ENCOUNTER
Michelle calling from [facility] states the patient needs a hospice evaluate and treat order. Also requesting to send over the most recent office note with it.

## 2025-07-22 DIAGNOSIS — F03.C0 SEVERE DEMENTIA WITHOUT BEHAVIORAL DISTURBANCE, PSYCHOTIC DISTURBANCE, MOOD DISTURBANCE, OR ANXIETY, UNSPECIFIED DEMENTIA TYPE (HCC): ICD-10-CM

## 2025-07-22 DIAGNOSIS — I10 PRIMARY HYPERTENSION: Primary | ICD-10-CM

## 2025-07-22 DIAGNOSIS — N18.31 STAGE 3A CHRONIC KIDNEY DISEASE (HCC): ICD-10-CM

## 2025-08-11 RX ORDER — POTASSIUM CHLORIDE 750 MG/1
10 TABLET, EXTENDED RELEASE ORAL 2 TIMES DAILY
Qty: 180 TABLET | Refills: 1 | Status: SHIPPED | OUTPATIENT
Start: 2025-08-11

## 2025-08-11 RX ORDER — MIRTAZAPINE 7.5 MG/1
7.5 TABLET, FILM COATED ORAL NIGHTLY
Qty: 90 TABLET | Refills: 1 | Status: SHIPPED | OUTPATIENT
Start: 2025-08-11

## 2025-08-11 RX ORDER — OMEPRAZOLE 20 MG/1
20 CAPSULE, DELAYED RELEASE ORAL
Qty: 180 CAPSULE | Refills: 1 | Status: SHIPPED | OUTPATIENT
Start: 2025-08-11

## 2025-08-12 RX ORDER — PSEUDOEPHEDRINE HCL 30 MG
2 TABLET ORAL DAILY
Qty: 90 TABLET | Refills: 1 | Status: SHIPPED | OUTPATIENT
Start: 2025-08-12

## 2025-08-18 ENCOUNTER — HOSPITAL ENCOUNTER (OUTPATIENT)
Facility: HOSPITAL | Age: 85
Discharge: HOME | End: 2025-08-21
Attending: INTERNAL MEDICINE
Payer: MEDICARE

## 2025-08-18 DIAGNOSIS — N18.32 CHRONIC KIDNEY DISEASE (CKD) STAGE G3B/A1, MODERATELY DECREASED GLOMERULAR FILTRATION RATE (GFR) BETWEEN 30-44 ML/MIN/1.73 SQUARE METER AND ALBUMINURIA CREATININE RATIO LESS THAN 30 MG/G (HCC): ICD-10-CM

## 2025-08-18 PROCEDURE — 76770 US EXAM ABDO BACK WALL COMP: CPT

## (undated) DEVICE — LINE SAMPLING ADVANCE ORAL NASAL MICROSTREAM O2 TUBING 6.5'

## (undated) DEVICE — MASK ANES INF SZ 2 PREM TAIL VLV INFL PRT UNSCENTED SGL PT

## (undated) DEVICE — KIT ENDOSCOPIC  PROC VIA

## (undated) DEVICE — MASK O2 MED AD 7 FT 3 IN 1 W/ STD CONN LTX